# Patient Record
Sex: MALE | Race: WHITE | NOT HISPANIC OR LATINO | Employment: OTHER | URBAN - METROPOLITAN AREA
[De-identification: names, ages, dates, MRNs, and addresses within clinical notes are randomized per-mention and may not be internally consistent; named-entity substitution may affect disease eponyms.]

---

## 2020-08-25 ENCOUNTER — OFFICE VISIT (OUTPATIENT)
Dept: INTERNAL MEDICINE | Age: 55
End: 2020-08-25

## 2020-08-25 VITALS
OXYGEN SATURATION: 99 % | SYSTOLIC BLOOD PRESSURE: 130 MMHG | WEIGHT: 181 LBS | BODY MASS INDEX: 23.99 KG/M2 | TEMPERATURE: 96.7 F | HEART RATE: 51 BPM | DIASTOLIC BLOOD PRESSURE: 80 MMHG | HEIGHT: 73 IN

## 2020-08-25 DIAGNOSIS — Z00.00 ROUTINE ADULT HEALTH MAINTENANCE: ICD-10-CM

## 2020-08-25 DIAGNOSIS — Z12.5 ENCOUNTER FOR PROSTATE CANCER SCREENING: ICD-10-CM

## 2020-08-25 DIAGNOSIS — Z00.00 ANNUAL PHYSICAL EXAM: Primary | ICD-10-CM

## 2020-08-25 DIAGNOSIS — Z13.1 SCREENING FOR DIABETES MELLITUS: ICD-10-CM

## 2020-08-25 DIAGNOSIS — Z83.71 FH: COLON POLYPS: ICD-10-CM

## 2020-08-25 DIAGNOSIS — Z12.11 COLON CANCER SCREENING: ICD-10-CM

## 2020-08-25 DIAGNOSIS — Z11.59 NEED FOR HEPATITIS C SCREENING TEST: ICD-10-CM

## 2020-08-25 DIAGNOSIS — Z13.220 SCREENING FOR LIPID DISORDERS: ICD-10-CM

## 2020-08-25 PROBLEM — M16.12 PRIMARY OSTEOARTHRITIS OF LEFT HIP: Status: ACTIVE | Noted: 2017-02-24

## 2020-08-25 PROBLEM — Z96.642 STATUS POST LEFT HIP REPLACEMENT: Status: ACTIVE | Noted: 2017-03-08

## 2020-08-25 PROCEDURE — 99386 PREV VISIT NEW AGE 40-64: CPT | Performed by: NURSE PRACTITIONER

## 2020-08-25 RX ORDER — IBUPROFEN 400 MG/1
400 TABLET ORAL EVERY 6 HOURS PRN
COMMUNITY

## 2020-08-25 NOTE — PATIENT INSTRUCTIONS
Health Maintenance, Male  Adopting a healthy lifestyle and getting preventive care are important in promoting health and wellness. Ask your health care provider about:  · The right schedule for you to have regular tests and exams.  · Things you can do on your own to prevent diseases and keep yourself healthy.  What should I know about diet, weight, and exercise?  Eat a healthy diet    · Eat a diet that includes plenty of vegetables, fruits, low-fat dairy products, and lean protein.  · Do not eat a lot of foods that are high in solid fats, added sugars, or sodium.  Maintain a healthy weight  Body mass index (BMI) is a measurement that can be used to identify possible weight problems. It estimates body fat based on height and weight. Your health care provider can help determine your BMI and help you achieve or maintain a healthy weight.  Get regular exercise  Get regular exercise. This is one of the most important things you can do for your health. Most adults should:  · Exercise for at least 150 minutes each week. The exercise should increase your heart rate and make you sweat (moderate-intensity exercise).  · Do strengthening exercises at least twice a week. This is in addition to the moderate-intensity exercise.  · Spend less time sitting. Even light physical activity can be beneficial.  Watch cholesterol and blood lipids  Have your blood tested for lipids and cholesterol at 20 years of age, then have this test every 5 years.  You may need to have your cholesterol levels checked more often if:  · Your lipid or cholesterol levels are high.  · You are older than 40 years of age.  · You are at high risk for heart disease.  What should I know about cancer screening?  Many types of cancers can be detected early and may often be prevented. Depending on your health history and family history, you may need to have cancer screening at various ages. This may include screening for:  · Colorectal cancer.  · Prostate  cancer.  · Skin cancer.  · Lung cancer.  What should I know about heart disease, diabetes, and high blood pressure?  Blood pressure and heart disease  · High blood pressure causes heart disease and increases the risk of stroke. This is more likely to develop in people who have high blood pressure readings, are of  descent, or are overweight.  · Talk with your health care provider about your target blood pressure readings.  · Have your blood pressure checked:  ? Every 3-5 years if you are 18-39 years of age.  ? Every year if you are 40 years old or older.  · If you are between the ages of 65 and 75 and are a current or former smoker, ask your health care provider if you should have a one-time screening for abdominal aortic aneurysm (AAA).  Diabetes  Have regular diabetes screenings. This checks your fasting blood sugar level. Have the screening done:  · Once every three years after age 45 if you are at a normal weight and have a low risk for diabetes.  · More often and at a younger age if you are overweight or have a high risk for diabetes.  What should I know about preventing infection?  Hepatitis B  If you have a higher risk for hepatitis B, you should be screened for this virus. Talk with your health care provider to find out if you are at risk for hepatitis B infection.  Hepatitis C  Blood testing is recommended for:  · Everyone born from 1945 through 1965.  · Anyone with known risk factors for hepatitis C.  Sexually transmitted infections (STIs)  · You should be screened each year for STIs, including gonorrhea and chlamydia, if:  ? You are sexually active and are younger than 24 years of age.  ? You are older than 24 years of age and your health care provider tells you that you are at risk for this type of infection.  ? Your sexual activity has changed since you were last screened, and you are at increased risk for chlamydia or gonorrhea. Ask your health care provider if you are at risk.  · Ask your  health care provider about whether you are at high risk for HIV. Your health care provider may recommend a prescription medicine to help prevent HIV infection. If you choose to take medicine to prevent HIV, you should first get tested for HIV. You should then be tested every 3 months for as long as you are taking the medicine.  Follow these instructions at home:  Lifestyle  · Do not use any products that contain nicotine or tobacco, such as cigarettes, e-cigarettes, and chewing tobacco. If you need help quitting, ask your health care provider.  · Do not use street drugs.  · Do not share needles.  · Ask your health care provider for help if you need support or information about quitting drugs.  Alcohol use  · Do not drink alcohol if your health care provider tells you not to drink.  · If you drink alcohol:  ? Limit how much you have to 0-2 drinks a day.  ? Be aware of how much alcohol is in your drink. In the [place] one drink equals one 12 oz bottle of beer (355 mL), one 5 oz glass of wine (148 mL), or one 1½ oz glass of hard liquor (44 mL).  General instructions  · Schedule regular health, dental, and eye exams.  · Stay current with your vaccines.  · Tell your health care provider if:  ? You often feel depressed.  ? You have ever been abused or do not feel safe at home.  Summary  · Adopting a healthy lifestyle and getting preventive care are important in promoting health and wellness.  · Follow your health care provider's instructions about healthy diet, exercising, and getting tested or screened for diseases.  · Follow your health care provider's instructions on monitoring your cholesterol and blood pressure.  This information is not intended to replace advice given to you by your health care provider. Make sure you discuss any questions you have with your health care provider.  Document Released: 06/15/2009 Document Revised: 12/11/2019 Document Reviewed: 12/11/2019  Elsevier Patient Education © 2020 [employer]

## 2020-08-25 NOTE — PROGRESS NOTES
[facility]  MADAI RICARDO      Serafin Mary Kate / 54 y.o. / male  08/25/2020    CC: Establish Care and Annual Exam      HPI:      Serafin presents for annual health maintenance visit. He has not had any regular medical care in some time.     · Last health maintenance visit: never  · General health: good  · Lifestyle:  · Attempting to lose weight?: No   · Diet: eats very healthy, fruits, granola, avoidance of red meat   · Exercise: exercises nearly every day and wakeboarding, skiing, cycling every day   · Tobacco: Never used   · Alcohol: occasional/rare  · Work: Full-time, construction   ·   · Reproductive health:  · Sexually active?: Yes   · Concern for STD?: No   · Sexual problems?: No problems   · Sees Urologist?: No     · Depression Screening:      PHQ-2/PHQ-9 Depression Screening 8/25/2020   Little interest or pleasure in doing things 0   Feeling down, depressed, or hopeless 0   Total Score 0         PHQ-2: 0 (Not depressed)     PHQ-9: 0 (Negative screening for depression)    Patient Care Team:  Madai Ortiz APRN as PCP - General (Internal Medicine)  ______________________________________________________________________    ALLERGIES  Allergies   Allergen Reactions   • Oxycodone-Acetaminophen Nausea And Vomiting        MEDICATIONS  Current Outpatient Medications on File Prior to Visit   Medication Sig   • ibuprofen (ADVIL,MOTRIN) 400 MG tablet Take 400 mg by mouth.     No current facility-administered medications on file prior to visit.        PFSH:     The following portions of the patient's history were reviewed and updated as appropriate: Allergies / Current Medications / Past Medical History / Surgical History / Social History / Family History    PROBLEM LIST   Patient Active Problem List   Diagnosis   • Primary osteoarthritis of left hip   • Status post left hip replacement       PAST MEDICAL HISTORY  Past Medical History:   Diagnosis Date   • Prostatitis        SURGICAL HISTORY  Past  Surgical History:   Procedure Laterality Date   • ANKLE SURGERY     • HIP SURGERY     • KNEE SURGERY Right        SOCIAL HISTORY  Social History     Socioeconomic History   • Marital status: Single     Spouse name: Not on file   • Number of children: 1   • Years of education: Not on file   • Highest education level: Not on file   Occupational History   • Occupation: construction    Tobacco Use   • Smoking status: Never Smoker   • Smokeless tobacco: Never Used   Substance and Sexual Activity   • Alcohol use: Yes   • Drug use: Never   • Sexual activity: Yes       FAMILY HISTORY  Family History   Problem Relation Age of Onset   • Alzheimer's disease Father    • Colon polyps Sister    • Heart attack Paternal Uncle    • Prostate cancer Neg Hx    • Colon cancer Neg Hx        IMMUNIZATION HISTORY    There is no immunization history on file for this patient.    ______________________________________________________________________    REVIEW OF SYSTEMS    Review of Systems   Constitutional: Negative for activity change, appetite change, fatigue, fever and unexpected weight change.   HENT: Negative for congestion, ear pain, hearing loss, sore throat and trouble swallowing.    Eyes: Negative for pain and visual disturbance.   Respiratory: Negative for cough and shortness of breath.    Cardiovascular: Negative for chest pain and leg swelling.   Gastrointestinal: Negative for abdominal pain, blood in stool, constipation, diarrhea, nausea and vomiting.   Endocrine: Negative for polydipsia, polyphagia and polyuria.   Genitourinary: Negative for decreased urine volume, difficulty urinating, discharge, dysuria, enuresis, frequency, hematuria, penile pain, penile swelling, scrotal swelling, testicular pain and urgency.   Musculoskeletal: Negative for gait problem.   Neurological: Negative for dizziness, syncope, speech difficulty, weakness, numbness and headaches.   Psychiatric/Behavioral: Negative for confusion and sleep  "disturbance. The patient is not nervous/anxious.          VITALS:    Visit Vitals  /80   Pulse 51   Temp 96.7 °F (35.9 °C) (Temporal)   Ht 185.4 cm (73\")   Wt 82.1 kg (181 lb)   SpO2 99%   BMI 23.88 kg/m²       BP Readings from Last 3 Encounters:   08/25/20 130/80     Wt Readings from Last 3 Encounters:   08/25/20 82.1 kg (181 lb)      Body mass index is 23.88 kg/m².    PHYSICAL EXAMINATION    Physical Exam   Constitutional: Vital signs are normal. He appears well-developed and well-nourished. He is cooperative. He does not appear ill. No distress.   HENT:   Head: Normocephalic and atraumatic.   Right Ear: Hearing, tympanic membrane, external ear and ear canal normal.   Left Ear: Hearing, tympanic membrane, external ear and ear canal normal.   Oral exam deferred due to COVID-19 pandemic and mask policy due to increased risk of aerosolized infection      Eyes: Pupils are equal, round, and reactive to light. EOM and lids are normal.   Neck: Trachea normal and full passive range of motion without pain. Carotid bruit is not present. No thyroid mass and no thyromegaly present.   Cardiovascular: Normal rate, regular rhythm, S1 normal, S2 normal and normal heart sounds.   No murmur heard.  Pulmonary/Chest: Effort normal and breath sounds normal.   Abdominal: Soft. Normal appearance and bowel sounds are normal. There is no tenderness.   Genitourinary:   Genitourinary Comments:  exam deferred per patient.   Denies any symptoms of testicular pain, swelling, groin pain, or other genital abnormalities.        Lymphadenopathy:     He has no cervical adenopathy.     He has no axillary adenopathy.   Neurological: He is alert. He has normal strength. He is not disoriented. No cranial nerve deficit or sensory deficit.   Reflex Scores:       Bicep reflexes are 2+ on the right side and 2+ on the left side.       Brachioradialis reflexes are 2+ on the right side and 2+ on the left side.       Patellar reflexes are 2+ on the " right side and 2+ on the left side.       Achilles reflexes are 2+ on the right side and 2+ on the left side.  Skin: Skin is warm, dry and intact.   Multiple scattered nevi to back and extremities. None are concerning in relation to size, color, borders.    Nursing note and vitals reviewed.        REVIEWED DATA    Labs:    No results found for: NA, K, AST, ALT, BUN, CREATININE, EGFRIFNONA, EGFRIFAFRI    No results found for: GLUCOSE, HGBA1C, TSH, FREET4    No results found for: PSA, TESTOSTEROTT    No results found for: LDL, HDL, TRIG, CHOLHDLRATIO    No components found for: DVJJ821B    No results found for: WBC, HGB, MCV, PLT    No results found for: PROTEIN, GLUCOSEU, BLOODU, NITRITEU, LEUKOCYTESUR     No results found for: HEPCVIRUSABY    Imaging:           Medical Tests:       ______________________________________________________________________    ASSESSMENT & PLAN    ANNUAL WELLNESS EXAM / PHYSICAL     Other medical problems addressed today:      Summary/Discussion:       1. Annual physical exam      2. Routine adult health maintenance    - Ambulatory Referral For Screening Colonoscopy  - CBC & Differential  - TSH Rfx On Abnormal To Free T4  - Urinalysis With Culture If Indicated - Urine, Clean Catch    3. Colon cancer screening    - Ambulatory Referral For Screening Colonoscopy    4. FH: colon polyps    - Ambulatory Referral For Screening Colonoscopy    5. Screening for lipid disorders    - Lipid Panel With / Chol / HDL Ratio    6. Screening for diabetes mellitus    - Comprehensive Metabolic Panel  - Urinalysis With Culture If Indicated - Urine, Clean Catch  - Hemoglobin A1c    7. Encounter for prostate cancer screening    - PSA Screen    8. Need for hepatitis C screening test    - Hepatitis C Antibody          Return in about 1 year (around 8/25/2021) for Annual physical with fasting labs 1 week prior .    No future appointments.      HEALTHCARE MAINTENANCE ISSUES:    Cancer Screening:  · Colon:  Initial/Next screening at age: OVERDUE, ordered referral for colonoscopy   · Repeat colon cancer screening: N/A at this time  · Prostate: PSA checked annually but no ANTWON deferred per patient after benefit vs. Risk discussion of screening   · Testicular: Recommended monthly self exam  · Skin: Monthly self skin examination, annual exam by health professional  · Lung:   · Other:    Screening Labs & Tests:  · Lab results reviewed & discussed with with patient or orders placed today.  · EKG:  · Vascular Screening:   · DEXA (75+ or risk factors):   · HEP C (If born [id number] or risk factors): Ordered    Immunization/Vaccinations (to be given today unless deferred by patient)  · Influenza: Recommended annual influenza vaccine  · Hepatitis A: Not needed at this time  · Tetanus/Pertussis: Declined by patient  · Pneumovax: Not needed at this time  · Prevnar 13: Not needed at this time  · Shingles: Recommended Shingrix at pharmacy  · Other:     Lifestyle Counseling:  · Lifestyle Modifications: Continue good lifestyle choices/modifications and recommended annual skin checks due to multiple moles and sun exposure  · Safety Issues: Always wear seatbelt, Avoid texting while driving   · Use sunscreen, regular skin examination  · Recommended annual dental/vision examination.  · Emotional/Stress/Sleep: Reviewed and  given when appropriate      Health Maintenance   Topic Date Due   • ANNUAL PHYSICAL  10/17/1966   • TDAP/TD VACCINES (1 - Tdap) 10/17/1974   • ZOSTER VACCINE (1 of 2) 10/17/2013   • HEPATITIS C SCREENING  08/25/2020   • COLONOSCOPY  08/25/2020   • INFLUENZA VACCINE  08/01/2020         **Carlos Disclaimer:   Much of this encounter note is an electronic transcription/translation of spoken language to printed text. The electronic translation of spoken language may permit erroneous, or at times, nonsensical words or phrases to be inadvertently transcribed. Although I have reviewed the note for such errors, some may  still exist.
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
## 2020-08-26 DIAGNOSIS — R97.20 ELEVATED PSA: Primary | ICD-10-CM

## 2020-08-26 LAB
ALBUMIN SERPL-MCNC: 4.5 G/DL (ref 3.5–5.2)
ALBUMIN/GLOB SERPL: 2.5 G/DL
ALP SERPL-CCNC: 57 U/L (ref 39–117)
ALT SERPL-CCNC: 18 U/L (ref 1–41)
APPEARANCE UR: CLEAR
AST SERPL-CCNC: 19 U/L (ref 1–40)
BACTERIA #/AREA URNS HPF: NORMAL /HPF
BASOPHILS # BLD AUTO: 0.03 10*3/MM3 (ref 0–0.2)
BASOPHILS NFR BLD AUTO: 0.6 % (ref 0–1.5)
BILIRUB SERPL-MCNC: 0.5 MG/DL (ref 0–1.2)
BILIRUB UR QL STRIP: NEGATIVE
BUN SERPL-MCNC: 17 MG/DL (ref 6–20)
BUN/CREAT SERPL: 19.5 (ref 7–25)
CALCIUM SERPL-MCNC: 9.1 MG/DL (ref 8.6–10.5)
CHLORIDE SERPL-SCNC: 105 MMOL/L (ref 98–107)
CHOLEST SERPL-MCNC: 179 MG/DL (ref 0–200)
CHOLEST/HDLC SERPL: 2.84 {RATIO}
CO2 SERPL-SCNC: 25.9 MMOL/L (ref 22–29)
COLOR UR: YELLOW
CREAT SERPL-MCNC: 0.87 MG/DL (ref 0.76–1.27)
EOSINOPHIL # BLD AUTO: 0.16 10*3/MM3 (ref 0–0.4)
EOSINOPHIL NFR BLD AUTO: 3.5 % (ref 0.3–6.2)
EPI CELLS #/AREA URNS HPF: NORMAL /HPF (ref 0–10)
ERYTHROCYTE [DISTWIDTH] IN BLOOD BY AUTOMATED COUNT: 13.3 % (ref 12.3–15.4)
GLOBULIN SER CALC-MCNC: 1.8 GM/DL
GLUCOSE SERPL-MCNC: 111 MG/DL (ref 65–99)
GLUCOSE UR QL: NEGATIVE
HBA1C MFR BLD: 5.2 % (ref 4.8–5.6)
HCT VFR BLD AUTO: 43.3 % (ref 37.5–51)
HCV AB S/CO SERPL IA: 0.1 S/CO RATIO (ref 0–0.9)
HDLC SERPL-MCNC: 63 MG/DL (ref 40–60)
HGB BLD-MCNC: 14.5 G/DL (ref 13–17.7)
HGB UR QL STRIP: NEGATIVE
IMM GRANULOCYTES # BLD AUTO: 0.01 10*3/MM3 (ref 0–0.05)
IMM GRANULOCYTES NFR BLD AUTO: 0.2 % (ref 0–0.5)
KETONES UR QL STRIP: NEGATIVE
LDLC SERPL CALC-MCNC: 99 MG/DL (ref 0–100)
LEUKOCYTE ESTERASE UR QL STRIP: NEGATIVE
LYMPHOCYTES # BLD AUTO: 1.13 10*3/MM3 (ref 0.7–3.1)
LYMPHOCYTES NFR BLD AUTO: 24.5 % (ref 19.6–45.3)
MCH RBC QN AUTO: 30.6 PG (ref 26.6–33)
MCHC RBC AUTO-ENTMCNC: 33.5 G/DL (ref 31.5–35.7)
MCV RBC AUTO: 91.4 FL (ref 79–97)
MICRO URNS: NORMAL
MICRO URNS: NORMAL
MONOCYTES # BLD AUTO: 0.28 10*3/MM3 (ref 0.1–0.9)
MONOCYTES NFR BLD AUTO: 6.1 % (ref 5–12)
MUCOUS THREADS URNS QL MICRO: PRESENT /HPF
NEUTROPHILS # BLD AUTO: 3.01 10*3/MM3 (ref 1.7–7)
NEUTROPHILS NFR BLD AUTO: 65.1 % (ref 42.7–76)
NITRITE UR QL STRIP: NEGATIVE
NRBC BLD AUTO-RTO: 0 /100 WBC (ref 0–0.2)
PH UR STRIP: 6.5 [PH] (ref 5–7.5)
PLATELET # BLD AUTO: 208 10*3/MM3 (ref 140–450)
POTASSIUM SERPL-SCNC: 4.6 MMOL/L (ref 3.5–5.2)
PROT SERPL-MCNC: 6.3 G/DL (ref 6–8.5)
PROT UR QL STRIP: NORMAL
PSA SERPL-MCNC: 4.92 NG/ML (ref 0–4)
RBC # BLD AUTO: 4.74 10*6/MM3 (ref 4.14–5.8)
RBC #/AREA URNS HPF: NORMAL /HPF (ref 0–2)
SODIUM SERPL-SCNC: 141 MMOL/L (ref 136–145)
SP GR UR: 1.02 (ref 1–1.03)
TRIGL SERPL-MCNC: 84 MG/DL (ref 0–150)
TSH SERPL DL<=0.005 MIU/L-ACNC: 1.6 UIU/ML (ref 0.27–4.2)
URINALYSIS REFLEX: NORMAL
UROBILINOGEN UR STRIP-MCNC: 0.2 MG/DL (ref 0.2–1)
VLDLC SERPL CALC-MCNC: 16.8 MG/DL
WBC # BLD AUTO: 4.62 10*3/MM3 (ref 3.4–10.8)
WBC #/AREA URNS HPF: NORMAL /HPF (ref 0–5)

## 2020-09-09 ENCOUNTER — RESULTS ENCOUNTER (OUTPATIENT)
Dept: INTERNAL MEDICINE | Age: 55
End: 2020-09-09
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
## 2020-09-09 DIAGNOSIS — R97.20 ELEVATED PSA: ICD-10-CM

## 2020-09-09 LAB — PSA SERPL-MCNC: 4.63 NG/ML (ref 0–4)

## 2020-09-10 ENCOUNTER — TELEPHONE (OUTPATIENT)
Dept: INTERNAL MEDICINE | Age: 55
End: 2020-09-10

## 2020-09-10 DIAGNOSIS — R97.20 ELEVATED PSA: Primary | ICD-10-CM

## 2020-09-22 ENCOUNTER — PREP FOR SURGERY (OUTPATIENT)
Dept: OTHER | Facility: HOSPITAL | Age: 55
End: 2020-09-22

## 2020-09-22 DIAGNOSIS — Z12.11 SCREEN FOR COLON CANCER: Primary | ICD-10-CM

## 2020-09-23 PROBLEM — Z12.11 SCREEN FOR COLON CANCER: Status: ACTIVE | Noted: 2020-09-23

## 2020-10-07 ENCOUNTER — TRANSCRIBE ORDERS (OUTPATIENT)
Dept: ADMINISTRATIVE | Facility: HOSPITAL | Age: 55
End: 2020-10-07

## 2020-10-07 DIAGNOSIS — Z01.818 OTHER SPECIFIED PRE-OPERATIVE EXAMINATION: Primary | ICD-10-CM

## 2020-10-10 ENCOUNTER — LAB (OUTPATIENT)
Dept: LAB | Facility: HOSPITAL | Age: 55
End: 2020-10-10

## 2020-10-10 DIAGNOSIS — Z01.818 OTHER SPECIFIED PRE-OPERATIVE EXAMINATION: ICD-10-CM

## 2020-10-10 PROCEDURE — C9803 HOPD COVID-19 SPEC COLLECT: HCPCS

## 2020-10-10 PROCEDURE — U0004 COV-19 TEST NON-CDC HGH THRU: HCPCS

## 2020-10-12 LAB — SARS-COV-2 RNA RESP QL NAA+PROBE: NOT DETECTED

## 2020-10-13 ENCOUNTER — ANESTHESIA (OUTPATIENT)
Dept: GASTROENTEROLOGY | Facility: HOSPITAL | Age: 55
End: 2020-10-13

## 2020-10-13 ENCOUNTER — HOSPITAL ENCOUNTER (OUTPATIENT)
Facility: HOSPITAL | Age: 55
Setting detail: HOSPITAL OUTPATIENT SURGERY
Discharge: HOME | End: 2020-10-13
Attending: SURGERY | Admitting: SURGERY

## 2020-10-13 ENCOUNTER — ANESTHESIA EVENT (OUTPATIENT)
Dept: GASTROENTEROLOGY | Facility: HOSPITAL | Age: 55
End: 2020-10-13

## 2020-10-13 VITALS
SYSTOLIC BLOOD PRESSURE: 138 MMHG | OXYGEN SATURATION: 100 % | HEIGHT: 73 IN | RESPIRATION RATE: 14 BRPM | BODY MASS INDEX: 23.59 KG/M2 | TEMPERATURE: 97.6 F | WEIGHT: 178 LBS | DIASTOLIC BLOOD PRESSURE: 85 MMHG | HEART RATE: 48 BPM

## 2020-10-13 DIAGNOSIS — Z12.11 SCREEN FOR COLON CANCER: ICD-10-CM

## 2020-10-13 PROBLEM — K63.5 COLON POLYPS: Status: ACTIVE | Noted: 2020-10-13

## 2020-10-13 PROCEDURE — 88305 TISSUE EXAM BY PATHOLOGIST: CPT | Performed by: SURGERY

## 2020-10-13 PROCEDURE — 45380 COLONOSCOPY AND BIOPSY: CPT | Performed by: SURGERY

## 2020-10-13 PROCEDURE — 25010000002 PROPOFOL 10 MG/ML EMULSION: Performed by: ANESTHESIOLOGY

## 2020-10-13 PROCEDURE — 45385 COLONOSCOPY W/LESION REMOVAL: CPT | Performed by: SURGERY

## 2020-10-13 PROCEDURE — S0260 H&P FOR SURGERY: HCPCS | Performed by: SURGERY

## 2020-10-13 DEVICE — DEV CLIP ENDO RESOLUTION360 CONTRL ROT 235CM: Type: IMPLANTABLE DEVICE | Site: DESCENDING COLON | Status: FUNCTIONAL

## 2020-10-13 RX ORDER — NALOXONE HCL 0.4 MG/ML
0.2 VIAL (ML) INJECTION AS NEEDED
Status: DISCONTINUED | OUTPATIENT
Start: 2020-10-13 | End: 2020-10-13 | Stop reason: HOSPADM

## 2020-10-13 RX ORDER — PROPOFOL 10 MG/ML
VIAL (ML) INTRAVENOUS CONTINUOUS PRN
Status: DISCONTINUED | OUTPATIENT
Start: 2020-10-13 | End: 2020-10-13 | Stop reason: SURG

## 2020-10-13 RX ORDER — PROPOFOL 10 MG/ML
VIAL (ML) INTRAVENOUS AS NEEDED
Status: DISCONTINUED | OUTPATIENT
Start: 2020-10-13 | End: 2020-10-13 | Stop reason: SURG

## 2020-10-13 RX ORDER — OXYCODONE AND ACETAMINOPHEN 7.5; 325 MG/1; MG/1
1 TABLET ORAL ONCE AS NEEDED
Status: DISCONTINUED | OUTPATIENT
Start: 2020-10-13 | End: 2020-10-13 | Stop reason: HOSPADM

## 2020-10-13 RX ORDER — SODIUM CHLORIDE, SODIUM LACTATE, POTASSIUM CHLORIDE, CALCIUM CHLORIDE 600; 310; 30; 20 MG/100ML; MG/100ML; MG/100ML; MG/100ML
30 INJECTION, SOLUTION INTRAVENOUS CONTINUOUS PRN
Status: DISCONTINUED | OUTPATIENT
Start: 2020-10-13 | End: 2020-10-13 | Stop reason: HOSPADM

## 2020-10-13 RX ORDER — ONDANSETRON 2 MG/ML
4 INJECTION INTRAMUSCULAR; INTRAVENOUS ONCE AS NEEDED
Status: DISCONTINUED | OUTPATIENT
Start: 2020-10-13 | End: 2020-10-13 | Stop reason: HOSPADM

## 2020-10-13 RX ORDER — FLUMAZENIL 0.1 MG/ML
0.2 INJECTION INTRAVENOUS AS NEEDED
Status: DISCONTINUED | OUTPATIENT
Start: 2020-10-13 | End: 2020-10-13 | Stop reason: HOSPADM

## 2020-10-13 RX ORDER — HYDROCODONE BITARTRATE AND ACETAMINOPHEN 7.5; 325 MG/1; MG/1
1 TABLET ORAL ONCE AS NEEDED
Status: DISCONTINUED | OUTPATIENT
Start: 2020-10-13 | End: 2020-10-13 | Stop reason: HOSPADM

## 2020-10-13 RX ORDER — FENTANYL CITRATE 50 UG/ML
50 INJECTION, SOLUTION INTRAMUSCULAR; INTRAVENOUS
Status: DISCONTINUED | OUTPATIENT
Start: 2020-10-13 | End: 2020-10-13 | Stop reason: HOSPADM

## 2020-10-13 RX ORDER — SODIUM CHLORIDE 0.9 % (FLUSH) 0.9 %
3 SYRINGE (ML) INJECTION EVERY 12 HOURS SCHEDULED
Status: DISCONTINUED | OUTPATIENT
Start: 2020-10-13 | End: 2020-10-13 | Stop reason: HOSPADM

## 2020-10-13 RX ORDER — PROMETHAZINE HYDROCHLORIDE 25 MG/1
25 SUPPOSITORY RECTAL ONCE AS NEEDED
Status: DISCONTINUED | OUTPATIENT
Start: 2020-10-13 | End: 2020-10-13 | Stop reason: HOSPADM

## 2020-10-13 RX ORDER — DIPHENHYDRAMINE HCL 25 MG
25 CAPSULE ORAL
Status: DISCONTINUED | OUTPATIENT
Start: 2020-10-13 | End: 2020-10-13 | Stop reason: HOSPADM

## 2020-10-13 RX ORDER — SODIUM CHLORIDE 0.9 % (FLUSH) 0.9 %
10 SYRINGE (ML) INJECTION AS NEEDED
Status: DISCONTINUED | OUTPATIENT
Start: 2020-10-13 | End: 2020-10-13 | Stop reason: HOSPADM

## 2020-10-13 RX ORDER — HYDROMORPHONE HCL 110MG/55ML
0.5 PATIENT CONTROLLED ANALGESIA SYRINGE INTRAVENOUS
Status: DISCONTINUED | OUTPATIENT
Start: 2020-10-13 | End: 2020-10-13 | Stop reason: HOSPADM

## 2020-10-13 RX ORDER — DIPHENHYDRAMINE HYDROCHLORIDE 50 MG/ML
12.5 INJECTION INTRAMUSCULAR; INTRAVENOUS
Status: DISCONTINUED | OUTPATIENT
Start: 2020-10-13 | End: 2020-10-13 | Stop reason: HOSPADM

## 2020-10-13 RX ORDER — EPHEDRINE SULFATE 50 MG/ML
5 INJECTION, SOLUTION INTRAVENOUS ONCE AS NEEDED
Status: DISCONTINUED | OUTPATIENT
Start: 2020-10-13 | End: 2020-10-13 | Stop reason: HOSPADM

## 2020-10-13 RX ORDER — PROMETHAZINE HYDROCHLORIDE 25 MG/1
25 TABLET ORAL ONCE AS NEEDED
Status: DISCONTINUED | OUTPATIENT
Start: 2020-10-13 | End: 2020-10-13 | Stop reason: HOSPADM

## 2020-10-13 RX ORDER — LABETALOL HYDROCHLORIDE 5 MG/ML
5 INJECTION, SOLUTION INTRAVENOUS
Status: DISCONTINUED | OUTPATIENT
Start: 2020-10-13 | End: 2020-10-13 | Stop reason: HOSPADM

## 2020-10-13 RX ORDER — LIDOCAINE HYDROCHLORIDE 20 MG/ML
INJECTION, SOLUTION INFILTRATION; PERINEURAL AS NEEDED
Status: DISCONTINUED | OUTPATIENT
Start: 2020-10-13 | End: 2020-10-13 | Stop reason: SURG

## 2020-10-13 RX ADMIN — SODIUM CHLORIDE, POTASSIUM CHLORIDE, SODIUM LACTATE AND CALCIUM CHLORIDE 30 ML/HR: 600; 310; 30; 20 INJECTION, SOLUTION INTRAVENOUS at 09:30

## 2020-10-13 RX ADMIN — PROPOFOL 140 MCG/KG/MIN: 10 INJECTION, EMULSION INTRAVENOUS at 09:46

## 2020-10-13 RX ADMIN — LIDOCAINE HYDROCHLORIDE 100 MG: 20 INJECTION, SOLUTION INFILTRATION; PERINEURAL at 09:46

## 2020-10-13 RX ADMIN — PROPOFOL 100 MG: 10 INJECTION, EMULSION INTRAVENOUS at 09:46

## 2020-10-13 NOTE — ANESTHESIA PREPROCEDURE EVALUATION
" Anesthesia Evaluation     Patient summary reviewed and Nursing notes reviewed   history of anesthetic complications:  NPO Solid Status: > 8 hours  NPO Liquid Status: > 2 hours           Airway   Mallampati: II  TM distance: >3 FB  Neck ROM: full  Dental - normal exam     Pulmonary - negative pulmonary ROS and normal exam   Cardiovascular - negative cardio ROS and normal exam  Exercise tolerance: good (4-7 METS)    Rhythm: regular        Neuro/Psych- negative ROS  GI/Hepatic/Renal/Endo - negative ROS     Musculoskeletal     Abdominal  - normal exam    Bowel sounds: normal.   Substance History - negative use     OB/GYN negative ob/gyn ROS         Other   arthritis,                      Anesthesia Plan    ASA 2     MAC   (Ht 185.4 cm (73\")   Wt 79.4 kg (175 lb)   BMI 23.09 kg/m²     I have reviewed the patient's history with the patient and the chart, including all pertinent laboratory results and imaging. I have explained the risks of anesthesia including but not limited to dental damage, corneal abrasion, nerve injury, MI, stroke, and death.  )  intravenous induction     Anesthetic plan, all risks, benefits, and alternatives have been provided, discussed and informed consent has been obtained with: patient.    Plan discussed with CRNA and attending.      "

## 2020-10-13 NOTE — ANESTHESIA POSTPROCEDURE EVALUATION
"Patient: Serafin Hartmann    Procedure Summary     Date: 10/13/20 Room / [facility]:  [facility]    Anesthesia Start: 0945 Anesthesia Stop: 1026    Procedure: COLONOSCOPY INTO CECUM WITH POLYPECTOMY X 3, CLIP X 1 (N/A ) Diagnosis:       Screen for colon cancer      (Screen for colon cancer [Z12.11])    Surgeon: Carlito Mcdaniels MD Provider: Ghazal Fajardo MD    Anesthesia Type: MAC ASA Status: 2          Anesthesia Type: MAC    Vitals  Vitals Value Taken Time   /72 10/13/20 1024   Temp     Pulse 49 10/13/20 1024   Resp 14 10/13/20 1024   SpO2 100 % 10/13/20 1024           Post Anesthesia Care and Evaluation    Patient location during evaluation: PHASE II  Patient participation: complete - patient participated  Level of consciousness: sleepy but conscious  Pain management: adequate  Airway patency: patent  Anesthetic complications: No anesthetic complications    Cardiovascular status: acceptable  Respiratory status: acceptable  Hydration status: acceptable    Comments: /72 (BP Location: Left arm, Patient Position: Lying)   Pulse (!) 49   Temp 36.4 °C (97.6 °F) (Oral)   Resp 14   Ht 185.4 cm (73\")   Wt 80.7 kg (178 lb)   SpO2 100%   BMI 23.48 kg/m²         "

## 2020-10-13 NOTE — H&P
Subjective:   Reason for Visit: Screening colonoscopy    HPI:  Patient presents for evaluation for colon cancer screening.  There is no family history of colon neoplasia. No family hx of gastric, ovarian, or uterine cancer.  Patient denies changes in bowel habits, diarrhea, constipation, abdominal pain, decreased caliber of stool, melena, brbpr and weight loss.  There is no personal or family history of bleeding disorder or untoward reaction to anesthesia.  Patient has never had a colonoscopy.      Past Medical History:   Diagnosis Date   • GERD (gastroesophageal reflux disease)    • Prostatitis        Past Surgical History:   Procedure Laterality Date   • HAND SURGERY Right    • HIP SURGERY     • KNEE SURGERY Right          Current Facility-Administered Medications:   •  lactated ringers infusion, 30 mL/hr, Intravenous, Continuous PRN, Carlito Mcdaniels MD, Last Rate: 30 mL/hr at 10/13/20 0930  •  sodium chloride 0.9 % flush 10 mL, 10 mL, Intravenous, PRN, Carlito Mcdaniels MD  •  sodium chloride 0.9 % flush 3 mL, 3 mL, Intravenous, Q12H, Carlito Mcdaneils MD    Facility-Administered Medications Ordered in Other Encounters:   •  lidocaine (XYLOCAINE) 2% injection, , Intravenous, PRN, Ghazal Fajardo MD, 100 mg at 10/13/20 0946  •  Propofol (DIPRIVAN) injection, , Intravenous, PRN, Ghazal Fajardo MD, 100 mg at 10/13/20 0946  •  Propofol (DIPRIVAN) injection, , , Continuous PRN, Ghazal Fajardo MD, Last Rate: 67.8 mL/hr at 10/13/20 0946, 140 mcg/kg/min at 10/13/20 0946    Allergies   Allergen Reactions   • Oxycodone-Acetaminophen Nausea And Vomiting       Family History   Problem Relation Age of Onset   • Alzheimer's disease Father    • Colon polyps Sister    • Heart attack Paternal Uncle    • Prostate cancer Neg Hx    • Colon cancer Neg Hx    • Malig Hyperthermia Neg Hx        Social History     Socioeconomic History   • Marital status: Single     Spouse  "name: Not on file   • Number of children: 1   • Years of education: Not on file   • Highest education level: Not on file   Occupational History   • Occupation: construction    Tobacco Use   • Smoking status: Never Smoker   • Smokeless tobacco: Never Used   Substance and Sexual Activity   • Alcohol use: Yes     Comment: OCCASIONALLY    • Drug use: Never   • Sexual activity: Yes        Review Of Systems:  A comprehensive review of systems was negative.    Objective:   Physical exam:  /88 (BP Location: Left arm, Patient Position: Lying)   Pulse 57   Temp 97.6 °F (36.4 °C) (Oral)   Resp 16   Ht 185.4 cm (73\")   Wt 80.7 kg (178 lb)   SpO2 99%   BMI 23.48 kg/m²     General Appearance:  awake, alert, oriented, in no acute distress and well developed, well nourished  Lungs:  Normal expansion.  Clear to auscultation.  No rales, rhonchi, or wheezing.  Heart:  Heart sounds are normal.  Regular rate and rhythm without murmur, gallop or rub.  Abdomen:  Soft, non-tender, normal bowel sounds; no bruits, organomegaly or masses.    Assessment:    Serafin Hartmann is a 54 y.o. male who presents for evaluation for colon cancer screening.    Patient has no increased risk factors or warning signs or symptoms.  I reviewed current ACG guidelines for colon cancer screening:    A)  Flex sig q 5yrs with yearly fecal occult blood testing  B)  Virtual colonoscopy  C)  Colonoscopy with possible biopsy/polypectomy with IV sedation at appropriate       screening intervals    The patient has family history of colon polyp.  He  has no personal history of colon polyp who presents for colon cancer screening evaluation.   I would advise a colonscopy.     The rationale for each option as well as all risks and benefits of each alternative were discussed with the patient in detail.  The patient understands and does wish to proceed with Colonoscopy Screening        The rationale for colonoscopy with possible biopsy, possible polypectomy, with IV " sedation as well as all risks, benefits, and alternatives were discussed with the patient in detail. Risks including but not limited to perforation, bleeding,need for blood transfusion or emergent surgery ,and missed neoplasm were reviewed in detail with the patient The patient demonstrates full understanding and wishes to proceed with the colonoscopy.

## 2020-10-15 LAB
LAB AP CASE REPORT: NORMAL
PATH REPORT.FINAL DX SPEC: NORMAL
PATH REPORT.GROSS SPEC: NORMAL

## 2020-10-19 ENCOUNTER — TELEPHONE (OUTPATIENT)
Dept: INTERNAL MEDICINE | Age: 55
End: 2020-10-19

## 2020-10-19 NOTE — TELEPHONE ENCOUNTER
Contact patient.    If it is a cyst of the skin (I.e. sebaceous cyst), then those are usually addressed by general surgery.   Does he need a referral back to general surgery or dermatology or both?

## 2020-10-19 NOTE — TELEPHONE ENCOUNTER
PATIENT CALLING BECAUSE HE NEEDS A REFERRAL TO SEE AND DERMATOLOGIST.     PATIENT CAN BE REACHED [phone]

## 2020-10-20 ENCOUNTER — OFFICE VISIT (OUTPATIENT)
Dept: INTERNAL MEDICINE | Age: 55
End: 2020-10-20

## 2020-10-20 ENCOUNTER — HOSPITAL ENCOUNTER (OUTPATIENT)
Dept: GENERAL RADIOLOGY | Facility: HOSPITAL | Age: 55
Discharge: HOME | End: 2020-10-20

## 2020-10-20 ENCOUNTER — TELEPHONE (OUTPATIENT)
Dept: CARDIOLOGY | Facility: CLINIC | Age: 55
End: 2020-10-20

## 2020-10-20 VITALS
WEIGHT: 190 LBS | TEMPERATURE: 96.8 F | HEIGHT: 73 IN | DIASTOLIC BLOOD PRESSURE: 78 MMHG | OXYGEN SATURATION: 98 % | BODY MASS INDEX: 25.18 KG/M2 | HEART RATE: 58 BPM | SYSTOLIC BLOOD PRESSURE: 136 MMHG

## 2020-10-20 DIAGNOSIS — S69.91XA THUMB INJURY, RIGHT, INITIAL ENCOUNTER: ICD-10-CM

## 2020-10-20 DIAGNOSIS — L72.0 EPIDERMAL CYST: ICD-10-CM

## 2020-10-20 DIAGNOSIS — I45.10 INCOMPLETE RBBB: ICD-10-CM

## 2020-10-20 DIAGNOSIS — R07.9 CHEST PAIN, UNSPECIFIED TYPE: Primary | ICD-10-CM

## 2020-10-20 DIAGNOSIS — Z86.16 HISTORY OF 2019 NOVEL CORONAVIRUS DISEASE (COVID-19): ICD-10-CM

## 2020-10-20 PROCEDURE — 99214 OFFICE O/P EST MOD 30 MIN: CPT | Performed by: NURSE PRACTITIONER

## 2020-10-20 PROCEDURE — 93000 ELECTROCARDIOGRAM COMPLETE: CPT | Performed by: NURSE PRACTITIONER

## 2020-10-20 PROCEDURE — 71046 X-RAY EXAM CHEST 2 VIEWS: CPT

## 2020-10-20 PROCEDURE — 73130 X-RAY EXAM OF HAND: CPT
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
## 2020-10-20 NOTE — TELEPHONE ENCOUNTER
Dr Kingston    Mr Hartmann is on to see you tomorrow, he test positive for COVID 6 weeks ago, is no longer having any symptoms but is having increased chest pain.  Are you okay with seeing him in the office?    Thank you  Morena RAHMAN

## 2020-10-21 ENCOUNTER — OFFICE VISIT (OUTPATIENT)
Dept: CARDIOLOGY | Facility: CLINIC | Age: 55
End: 2020-10-21

## 2020-10-21 VITALS
HEIGHT: 72 IN | HEART RATE: 66 BPM | DIASTOLIC BLOOD PRESSURE: 80 MMHG | SYSTOLIC BLOOD PRESSURE: 132 MMHG | BODY MASS INDEX: 25.6 KG/M2 | WEIGHT: 189 LBS | OXYGEN SATURATION: 98 % | TEMPERATURE: 97.3 F

## 2020-10-21 DIAGNOSIS — U07.1 COVID-19 VIRUS DETECTED: ICD-10-CM

## 2020-10-21 DIAGNOSIS — R07.2 PRECORDIAL PAIN: Primary | ICD-10-CM

## 2020-10-21 LAB
BASOPHILS # BLD AUTO: 0.06 10*3/MM3 (ref 0–0.2)
BASOPHILS NFR BLD AUTO: 1 % (ref 0–1.5)
CRP SERPL-MCNC: 0.09 MG/DL (ref 0–0.5)
EOSINOPHIL # BLD AUTO: 0.54 10*3/MM3 (ref 0–0.4)
EOSINOPHIL NFR BLD AUTO: 9.4 % (ref 0.3–6.2)
ERYTHROCYTE [DISTWIDTH] IN BLOOD BY AUTOMATED COUNT: 13.1 % (ref 12.3–15.4)
ERYTHROCYTE [SEDIMENTATION RATE] IN BLOOD BY WESTERGREN METHOD: 6 MM/HR (ref 0–20)
HCT VFR BLD AUTO: 43.9 % (ref 37.5–51)
HGB BLD-MCNC: 14.6 G/DL (ref 13–17.7)
IMM GRANULOCYTES # BLD AUTO: 0.01 10*3/MM3 (ref 0–0.05)
IMM GRANULOCYTES NFR BLD AUTO: 0.2 % (ref 0–0.5)
LYMPHOCYTES # BLD AUTO: 1.42 10*3/MM3 (ref 0.7–3.1)
LYMPHOCYTES NFR BLD AUTO: 24.6 % (ref 19.6–45.3)
MCH RBC QN AUTO: 29.9 PG (ref 26.6–33)
MCHC RBC AUTO-ENTMCNC: 33.3 G/DL (ref 31.5–35.7)
MCV RBC AUTO: 90 FL (ref 79–97)
MONOCYTES # BLD AUTO: 0.36 10*3/MM3 (ref 0.1–0.9)
MONOCYTES NFR BLD AUTO: 6.2 % (ref 5–12)
NEUTROPHILS # BLD AUTO: 3.38 10*3/MM3 (ref 1.7–7)
NEUTROPHILS NFR BLD AUTO: 58.6 % (ref 42.7–76)
NRBC BLD AUTO-RTO: 0 /100 WBC (ref 0–0.2)
PLATELET # BLD AUTO: 193 10*3/MM3 (ref 140–450)
RBC # BLD AUTO: 4.88 10*6/MM3 (ref 4.14–5.8)
TROPONIN I SERPL-MCNC: <0.01 NG/ML (ref 0–0.04)
WBC # BLD AUTO: 5.77 10*3/MM3 (ref 3.4–10.8)

## 2020-10-21 PROCEDURE — 93000 ELECTROCARDIOGRAM COMPLETE: CPT | Performed by: INTERNAL MEDICINE

## 2020-10-21 PROCEDURE — 99204 OFFICE O/P NEW MOD 45 MIN: CPT | Performed by: INTERNAL MEDICINE

## 2020-10-21 NOTE — PROGRESS NOTES
PATIENTINFORMATION    Date of Office Visit: 10/21/20  Encounter Provider: Kate Kingston MD  Place of Service: [facility] CARDIOLOGY  Patient Name: Serafin Hartmann  : 1965    Subjective:         Patient ID: Serafin Hartmann is a 55 y.o. male.      History of Present Illness    This is a gentleman who was diagnosed with Covid on 2020.  He is an avid bike rider and did some reading and decided that he should probably take it easy, even though at the time of his diagnosis he was still riding his bike aggressively.  He took a few weeks off and then started noticing some discomfort in the center of his chest which felt like a pressure.  It seems to be worse at night when he is lying down.  He has been walking and has not had any exertional symptoms.  He saw his primary provider yesterday who performed an EKG.  The report says there is an incomplete left bundle branch block.  That is not present on today's EKG nor was it present on a prior EKG.        Review of Systems   Constitution: Negative for fever, malaise/fatigue, weight gain and weight loss.   HENT: Negative for ear pain, hearing loss, nosebleeds and sore throat.    Eyes: Negative for double vision, pain, vision loss in left eye and vision loss in right eye.   Cardiovascular:        See history of present illness.   Respiratory: Positive for shortness of breath. Negative for cough, sleep disturbances due to breathing, snoring and wheezing.    Endocrine: Negative for cold intolerance, heat intolerance and polyuria.   Skin: Negative for itching, poor wound healing and rash.   Musculoskeletal: Negative for joint pain, joint swelling and myalgias.   Gastrointestinal: Negative for abdominal pain, diarrhea, hematochezia, nausea and vomiting.   Genitourinary: Negative for hematuria and hesitancy.   Neurological: Negative for numbness, paresthesias and seizures.   Psychiatric/Behavioral: Negative for depression. The patient is not  "nervous/anxious.            ECG 12 Lead    Date/Time: 10/21/2020 10:25 AM  Performed by: Kate Kingston MD  Authorized by: Kate Kingston MD   Previous ECG: no previous ECG available  Rhythm: sinus rhythm  BPM: 56  Conduction: conduction normal  ST Segments: ST segments normal  T Waves: T waves normal    Clinical impression: normal ECG               Objective:     /80 (BP Location: Left arm)   Pulse 66   Temp 97.3 °F (36.3 °C) (Infrared)   Ht 182.9 cm (72\")   Wt 85.7 kg (189 lb)   SpO2 98%   BMI 25.63 kg/m²  Body mass index is 25.63 kg/m².     Vitals signs reviewed.   Constitutional:       Appearance: Normal appearance. Well-developed.   Eyes:      General: Lids are normal. Lids are everted, no foreign bodies appreciated.      Conjunctiva/sclera: Conjunctivae normal.      Pupils: Pupils are equal, round, and reactive to light.   HENT:      Head: Normocephalic and atraumatic.   Neck:      Musculoskeletal: Normal range of motion.      Thyroid: No thyroid mass.      Vascular: No carotid bruit or JVD.      Trachea: No tracheal deviation.   Pulmonary:      Effort: Pulmonary effort is normal.      Breath sounds: Normal breath sounds.   Cardiovascular:      Normal rate. Regular rhythm.   Pulses:     Dorsalis pedis: 2+ bilaterally.  Abdominal:      General: Bowel sounds are normal.   Musculoskeletal: Normal range of motion.   Skin:     General: Skin is warm and dry.   Neurological:      Mental Status: Alert.   Psychiatric:         Behavior: Behavior normal.         Lab Review: I reviewed labs from October 20, 2020.  Troponin negative.  C-reactive protein is low.  Sedimentation rate is normal.      Assessment/Plan:       1.  Chest discomfort.  Atypical.  I do think it is reasonable to check a treadmill stress test as well as an echocardiogram given his recent diagnosis of Covid.  2.  Abnormal EKG.  Working to get that copy of the EKG faxed over so I can review it.  Today's EKG is normal.    If his echo and " treadmill look okay, I would feel comfortable telling him he can be released to go back to his routine exercises.    Addendum: I was able to get a copy of the EKG.  It was read as a incomplete right bundle branch block.  However, in reviewing it myself, is a normal EKG and no significant difference from today's EKG.    Orders Placed This Encounter   Procedures   • Treadmill Stress Test     Standing Status:   Future     Standing Expiration Date:   10/21/2021     Order Specific Question:   Reason for exam?     Answer:   Chest Pain   • ECG 12 Lead     This order was created via procedure documentation   • Adult Transthoracic Echo Complete W/ Cont if Necessary Per Protocol     Standing Status:   Future     Standing Expiration Date:   10/21/2021     Order Specific Question:   Reason for exam?     Answer:   Chest Pain        Discharge Medications          Accurate as of October 21, 2020 10:59 AM. If you have any questions, ask your nurse or doctor.            Continue These Medications      Instructions Start Date   ibuprofen 400 MG tablet  Commonly known as: ADVIL,MOTRIN   400 mg, Oral      MULTIVITAMIN ADULT PO   1 tablet, Oral, Daily                    Kate Kingston MD  10/21/20  10:59 EDT

## 2020-10-26 ENCOUNTER — TELEPHONE (OUTPATIENT)
Dept: SURGERY | Facility: CLINIC | Age: 55
End: 2020-10-26

## 2020-10-26 NOTE — TELEPHONE ENCOUNTER
I called and spoke with Serafin Mary Kate regarding his colonoscopy results.     Pathology report:   Final Diagnosis   1. Descending Colon, Biopsy:               A. Tubular adenoma.               B. Negative for high-grade dysplasia.     2. Distal Descending Colon, Biopsy:               A. Tubular adenoma.               B. Negative for high-grade dysplasia.     3. Sigmoid Colon, Biopsy:               A. Tubular adenoma.               B. Negative for high-grade dysplasia.         I recommend that he undergoes colonoscopy in 5 years for surveillance    He verbalized understanding and agreed    Carlito Mcdaniels MD  General, Minimally Invasive and Endoscopic Surgery  [facility]    [address], [address]  [place], [place], [address]  P: [phone]  F: [phone]

## 2020-10-28 ENCOUNTER — PROCEDURE VISIT (OUTPATIENT)
Dept: SURGERY | Facility: CLINIC | Age: 55
End: 2020-10-28

## 2020-10-28 VITALS — HEIGHT: 72 IN | WEIGHT: 189 LBS | BODY MASS INDEX: 25.6 KG/M2

## 2020-10-28 DIAGNOSIS — L98.9 SKIN LESION OF RIGHT LEG: Primary | ICD-10-CM

## 2020-10-28 DIAGNOSIS — L72.3 SEBACEOUS CYST: ICD-10-CM

## 2020-10-28 PROCEDURE — 11401 EXC TR-EXT B9+MARG 0.6-1 CM: CPT | Performed by: SURGERY

## 2020-10-28 PROCEDURE — 12032 INTMD RPR S/A/T/EXT 2.6-7.5: CPT | Performed by: SURGERY

## 2020-10-28 PROCEDURE — 11104 PUNCH BX SKIN SINGLE LESION: CPT | Performed by: SURGERY

## 2020-10-28 PROCEDURE — 11402 EXC TR-EXT B9+MARG 1.1-2 CM: CPT | Performed by: SURGERY

## 2020-10-28 PROCEDURE — 88305 TISSUE EXAM BY PATHOLOGIST: CPT | Performed by: SURGERY

## 2020-10-28 NOTE — PROGRESS NOTES
Office Consultation    Madai Ortiz APRN    Chief Complaint   Patient presents with   • Abdomal Wall Nodule   • RLE Nodule       HPI:       Serafin Hartmann is a 55 y.o. male who presents for evaluation of a subcutaneous nodule located over the chest, abdomen and right thigh. This has been present for several years.  There has been no associated symptoms of discharge, tenderness, sudden swelling, or pain. He has small amount of drainage from chest cyst.  Patient does have a history of epidermal inclusion cysts.  Had a subcutaneous mass removed when in college at the abdominal wall.  He does not recall the final pathology remembers it was benign    Past Medical History:   Diagnosis Date   • GERD (gastroesophageal reflux disease)    • Prostatitis        Past Surgical History:   Procedure Laterality Date   • COLONOSCOPY N/A 10/13/2020    Procedure: COLONOSCOPY INTO CECUM WITH POLYPECTOMY X 3, CLIP X 1;  Surgeon: Carlito Mcdaniels MD;  Location: [facility] ENDOSCOPY;  Service: General;  Laterality: N/A;  pre: screening  post: colon polyps x 3   • HAND SURGERY Right    • HIP SURGERY     • KNEE SURGERY Right        Current Outpatient Medications on File Prior to Visit   Medication Sig Dispense Refill   • ibuprofen (ADVIL,MOTRIN) 400 MG tablet Take 400 mg by mouth Every 6 (Six) Hours As Needed.     • Multiple Vitamins-Minerals (MULTIVITAMIN ADULT PO) Take 1 tablet by mouth Daily.       No current facility-administered medications on file prior to visit.        Allergies   Allergen Reactions   • Oxycodone-Acetaminophen Nausea And Vomiting and GI Intolerance       Social History     Socioeconomic History   • Marital status: Single     Spouse name: Not on file   • Number of children: 1   • Years of education: Not on file   • Highest education level: Not on file   Occupational History   • Occupation: construction    Tobacco Use   • Smoking status: Never Smoker   • Smokeless tobacco: Never Used   • Tobacco comment: daily  "caffine   Substance and Sexual Activity   • Alcohol use: Yes     Comment: OCCASIONALLY    • Drug use: Never   • Sexual activity: Yes       Family History   Problem Relation Age of Onset   • Alzheimer's disease Father    • Colon polyps Sister    • Heart attack Paternal Uncle    • Prostate cancer Neg Hx    • Colon cancer Neg Hx    • Malig Hyperthermia Neg Hx        REVIEW OF SYSTEMS    CONSTITUTIONAL: Denies fevers, chills, unintentional weight loss or weight gain  RESPIRATORY: Denies chronic cough or sob.   CARDIAC: Denies chest pain, palpitations, edema  GI: Denies dyspepsia, reflux, heartburn, nausea, vomiting, diarrhea or constipation  : Denies dysuria or hematuria.    MUSCULOSKELETAL: Denies muscle weakness and pain   NEURO: Denies chronic headaches.   ENDOCRINE: Denies significant heat or cold intolerance or history of thyroid problems.    DERM: no rashes,lesions or discharge.     Physical Examination  Ht 182.9 cm (72\")   Wt 85.7 kg (189 lb)   BMI 25.63 kg/m²   Body mass index is 25.63 kg/m².  GENERAL:alert, well appearing, and in no distress and oriented to person, place, and time  HEENT: normochephalic, atraumatic, no scleral icterus moist mucous membranes.  NECK: Supple there is no thyromegaly or lymphadenopathy  CHEST: clear to auscultation, no wheezes, rales or rhonchi, symmetric air entry.  Small subcutaneous mass in the mid chest without infection.  CARDIAC: regular rate and rhythm  ABDOMEN: soft, nontender, nondistended, no masses or organomegaly.  Over the suprapubic abdominal wall area there is a 1 x 1 cm soft tissue mass.  There is no tenderness  EXTREMITIES: no cyanosis, clubbing or edema.  Over the lower anterior thigh there is a raised mole that measures 4 mm, is round and symmetric with skin color  NEURO: alert and oriented, normal speech, cranial nerves 2-12 grossly intact, no focal deficits     Assessment:     The patient is a very pleasant 55 y.o. male with a subcutaneous mass located at  " chest, abdomen consistent with sebaceous cyst and ride thigh consistent with mole.  He is mildly symptomatic from them and desires excision.  Discussed with him about the benign nature of all the lumps and skin lesions.  We have agreed for surgical excision with local anesthesia.       Plan:     1. I have discussed treatment options consisting of observation or excision under local anesthesia.    2. Patient is inclined to proceed with excision.  3. Verbal patient instruction given.    Procedure:     PREOPERATIVE DIAGNOSES:    (1) Soft tissue mass mid chest, suprapubic abdominal wall and right anterior thigh mole    POSTOPERATIVE DIAGNOSES:    (1) Soft tissue mass mid chest of 1 x 1 cm, suprapubic abdominal wall of 2 x 2 cm and 5 mm mole anterior right thigh    PROCEDURE:    (1) Excision of soft tissue mass mid chest a 1 x 1 cm  (2) Excision of soft tissue mass suprapubic abdominal wall 2 x 2 cm  (3) Punch biopsy of right anterior thigh 5 mm skin lesion  (4) Layered closure    SURGEON/STAFF:  PORTIA Mcdaniels      NEEDLE AND SPONGE COUNT:  Correct.  SPECIMEN: Sebaceous cyst x2, skin lesion  INTRAOPERATIVE COMPLICATIONS:  None.   Andesthesia: local      OPERATION:  The patient was brought to the procedure room table and placed in the supine position.  I started by performing the right anterior thigh skin lesion punch biopsy.  With a 5 mm punch biopsy device and after the skin was prepped and draped in usual sterile fashion 2% lidocaine with epinephrine was injected subcutaneously.  The lesion was completely removed.  The wound was closed with interrupted 4-0 nylon.  There was covered with antibiotic ointment and Band-Aid.  Next the skin overlying the suprapubic abdominal wall soft tissue mass was prepped and draped in usual sterile fashion.  An elliptical incision of 2 x 2 cm was performed with scalpel after 2% lidocaine with epinephrine was injected.  Dissection was carried into subcutaneous tissue.  A 2 x 2 soft tissue mass  was encountered and dissected circumferentially.  Was completely excised and sent for pathology analysis.  Hemostasis was achieved.  The wound was closed in 2 layers with interrupted 2-0 Vicryl and running 4-0 Vicryl.  The wound was covered with surgical glue.  I continued the procedure by prepping and draping the mid anterior chest soft tissue mass.  A 1.5 x 1.5 cm elliptical incision was performed with scalpel and dissection was carried into the subcutaneous tissue after 2% lidocaine with epinephrine was injected.  The mass was seen confidentially dissected and he was found to be 1 x 1 cm consistent with a sebaceous cyst.  The mass was completely removed and sent for pathological analysis.  Hemostasis was achieved.  The wound was closed in 2 layers with interrupted 3-0 Vicryl and running 4-0 Monocryl.  The wound was covered with surgical glue.  The patient tolerated procedure well and was sent home in stable condition.  Instrument sponge count were correct    Instructions:  -Cannot shower for 48 hours  -Remove Band-Aid from anterior thigh in 48 hours, can shower after  -Can come to the office for suture removal in 7 days.  Patient states he can remove the suture at home.  Discussed with him about the need to come to the office having any problems with the wound.  -Call the office in 3 days for pathology report    Follow-up in my office as needed for wound problems    Carlito Mcdaniels MD, FACS  General, Minimally Invasive and Endoscopic Surgery  [facility]    [address], [address]  [place], [place], [address]  P: [phone]  F: [phone]

## 2020-10-30 LAB
LAB AP CASE REPORT: NORMAL
PATH REPORT.FINAL DX SPEC: NORMAL
PATH REPORT.GROSS SPEC: NORMAL

## 2020-11-02 ENCOUNTER — TELEPHONE (OUTPATIENT)
Dept: SURGERY | Facility: CLINIC | Age: 55
End: 2020-11-02

## 2020-11-02 NOTE — TELEPHONE ENCOUNTER
I called the patient regarding his biopsies report    Final Diagnosis   1. Skin, Right Thigh, Excision:  Benign skin with features of hypertrophic scar     Lou Mcmahon and  have seen this part in consult and concur.     2. Skin, Abdominal Wall, Excision:                 A. Epidermal inclusion cyst.     3. Skin, Chest Wall, Excision:               A. Epidermal inclusion cyst.     Patient will remove the sutures at home in a week, I offer him to come here if needed, I discussed with him about the benign nature of all the biopsies.  He understood

## 2020-11-13 ENCOUNTER — TELEPHONE (OUTPATIENT)
Dept: CARDIOLOGY | Facility: CLINIC | Age: 55
End: 2020-11-13

## 2020-11-13 ENCOUNTER — HOSPITAL ENCOUNTER (OUTPATIENT)
Dept: CARDIOLOGY | Facility: HOSPITAL | Age: 55
Discharge: HOME | End: 2020-11-13

## 2020-11-13 VITALS
HEIGHT: 72 IN | SYSTOLIC BLOOD PRESSURE: 148 MMHG | HEART RATE: 52 BPM | BODY MASS INDEX: 25.6 KG/M2 | WEIGHT: 189 LBS | DIASTOLIC BLOOD PRESSURE: 82 MMHG

## 2020-11-13 DIAGNOSIS — R07.2 PRECORDIAL PAIN: ICD-10-CM

## 2020-11-13 DIAGNOSIS — U07.1 COVID-19 VIRUS DETECTED: ICD-10-CM

## 2020-11-13 LAB
BH CV STRESS BP STAGE 1: NORMAL
BH CV STRESS BP STAGE 2: NORMAL
BH CV STRESS BP STAGE 3: NORMAL
BH CV STRESS BP STAGE 4: NORMAL
BH CV STRESS DURATION MIN STAGE 1: 3
BH CV STRESS DURATION MIN STAGE 2: 3
BH CV STRESS DURATION MIN STAGE 3: 3
BH CV STRESS DURATION MIN STAGE 4: 3
BH CV STRESS DURATION SEC STAGE 1: 0
BH CV STRESS DURATION SEC STAGE 2: 0
BH CV STRESS DURATION SEC STAGE 3: 0
BH CV STRESS DURATION SEC STAGE 4: 0
BH CV STRESS GRADE STAGE 1: 10
BH CV STRESS GRADE STAGE 2: 12
BH CV STRESS GRADE STAGE 3: 14
BH CV STRESS GRADE STAGE 4: 16
BH CV STRESS HR STAGE 1: 90
BH CV STRESS HR STAGE 2: 115
BH CV STRESS HR STAGE 3: 143
BH CV STRESS HR STAGE 4: 150
BH CV STRESS METS STAGE 1: 5
BH CV STRESS METS STAGE 2: 7.5
BH CV STRESS METS STAGE 3: 10
BH CV STRESS METS STAGE 4: 13.5
BH CV STRESS PROTOCOL 1: NORMAL
BH CV STRESS RECOVERY BP: NORMAL MMHG
BH CV STRESS RECOVERY HR: 78 BPM
BH CV STRESS SPEED STAGE 1: 1.7
BH CV STRESS SPEED STAGE 2: 2.5
BH CV STRESS SPEED STAGE 3: 3.4
BH CV STRESS SPEED STAGE 4: 4.2
BH CV STRESS STAGE 1: 1
BH CV STRESS STAGE 2: 2
BH CV STRESS STAGE 3: 3
BH CV STRESS STAGE 4: 4
MAXIMAL PREDICTED HEART RATE: 163 BPM
PERCENT MAX PREDICTED HR: 79.75 %
STRESS BASELINE BP: NORMAL MMHG
STRESS BASELINE HR: 60 BPM
STRESS PERCENT HR: 94 %
STRESS POST ESTIMATED WORKLOAD: 13 METS
STRESS POST EXERCISE DUR MIN: 12 MIN
STRESS POST EXERCISE DUR SEC: 0 SEC
STRESS POST PEAK BP: NORMAL MMHG
STRESS POST PEAK HR: 130 BPM
STRESS TARGET HR: 139 BPM

## 2020-11-13 PROCEDURE — 93356 MYOCRD STRAIN IMG SPCKL TRCK: CPT

## 2020-11-13 PROCEDURE — 93016 CV STRESS TEST SUPVJ ONLY: CPT | Performed by: INTERNAL MEDICINE

## 2020-11-13 PROCEDURE — 93018 CV STRESS TEST I&R ONLY: CPT | Performed by: INTERNAL MEDICINE

## 2020-11-13 PROCEDURE — 93017 CV STRESS TEST TRACING ONLY: CPT

## 2020-11-13 PROCEDURE — 93356 MYOCRD STRAIN IMG SPCKL TRCK: CPT | Performed by: INTERNAL MEDICINE

## 2020-11-13 PROCEDURE — 93306 TTE W/DOPPLER COMPLETE: CPT | Performed by: INTERNAL MEDICINE

## 2020-11-13 PROCEDURE — 93306 TTE W/DOPPLER COMPLETE: CPT

## 2020-11-13 NOTE — TELEPHONE ENCOUNTER
1.  Chest discomfort.  Atypical.  I do think it is reasonable to check a treadmill stress test as well as an echocardiogram given his recent diagnosis of Covid.  2.  Abnormal EKG.  Working to get that copy of the EKG faxed over so I can review it.  Today's EKG is normal.     If his echo and treadmill look okay, I would feel comfortable telling him he can be released to go back to his routine exercises.     Addendum: I was able to get a copy of the EKG.  It was read as a incomplete right bundle branch block.  However, in reviewing it myself, is a normal EKG and no significant difference from today's EKG.    Him know that his echocardiogram shows normal function with only mild mitral regurgitation.  Overall considered to be a normal echo.  Stress test does not suggest any significant blockages in his arteries.  I recommend that he go back to his usual exercise regimen.

## 2020-11-16 LAB
AORTIC ARCH: 3.1 CM
ASCENDING AORTA: 3.2 CM
BH CV ECHO MEAS - ACS: 2.4 CM
BH CV ECHO MEAS - AO ARCH DIAM (PROXIMAL TRANS.): 3.1 CM
BH CV ECHO MEAS - AO MAX PG (FULL): 3.3 MMHG
BH CV ECHO MEAS - AO MAX PG: 8.2 MMHG
BH CV ECHO MEAS - AO MEAN PG (FULL): 3 MMHG
BH CV ECHO MEAS - AO MEAN PG: 5 MMHG
BH CV ECHO MEAS - AO ROOT AREA (BSA CORRECTED): 1.8
BH CV ECHO MEAS - AO ROOT AREA: 11.3 CM^2
BH CV ECHO MEAS - AO ROOT DIAM: 3.8 CM
BH CV ECHO MEAS - AO V2 MAX: 143 CM/SEC
BH CV ECHO MEAS - AO V2 MEAN: 106 CM/SEC
BH CV ECHO MEAS - AO V2 VTI: 34.3 CM
BH CV ECHO MEAS - ASC AORTA: 3.2 CM
BH CV ECHO MEAS - AVA(I,A): 2.3 CM^2
BH CV ECHO MEAS - AVA(I,D): 2.3 CM^2
BH CV ECHO MEAS - AVA(V,A): 2.9 CM^2
BH CV ECHO MEAS - AVA(V,D): 2.9 CM^2
BH CV ECHO MEAS - BSA(HAYCOCK): 2.1 M^2
BH CV ECHO MEAS - BSA: 2.1 M^2
BH CV ECHO MEAS - BZI_BMI: 23.7 KILOGRAMS/M^2
BH CV ECHO MEAS - BZI_METRIC_HEIGHT: 185.4 CM
BH CV ECHO MEAS - BZI_METRIC_WEIGHT: 81.6 KG
BH CV ECHO MEAS - EDV(MOD-SP2): 142 ML
BH CV ECHO MEAS - EDV(MOD-SP4): 147 ML
BH CV ECHO MEAS - EDV(TEICH): 166.6 ML
BH CV ECHO MEAS - EF(CUBED): 67.2 %
BH CV ECHO MEAS - EF(MOD-BP): 62 %
BH CV ECHO MEAS - EF(MOD-SP2): 65.5 %
BH CV ECHO MEAS - EF(MOD-SP4): 61.2 %
BH CV ECHO MEAS - EF(TEICH): 58 %
BH CV ECHO MEAS - EF_3D-VOL: 57 %
BH CV ECHO MEAS - ESV(MOD-SP2): 49 ML
BH CV ECHO MEAS - ESV(MOD-SP4): 57 ML
BH CV ECHO MEAS - ESV(TEICH): 70 ML
BH CV ECHO MEAS - FS: 31 %
BH CV ECHO MEAS - IVS/LVPW: 1.1
BH CV ECHO MEAS - IVSD: 1 CM
BH CV ECHO MEAS - LA 3D VOL INDEX: 53
BH CV ECHO MEAS - LAT PEAK E' VEL: 11.2 CM/SEC
BH CV ECHO MEAS - LV DIASTOLIC VOL/BSA (35-75): 71.4 ML/M^2
BH CV ECHO MEAS - LV MASS(C)D: 218.1 GRAMS
BH CV ECHO MEAS - LV MASS(C)DI: 106 GRAMS/M^2
BH CV ECHO MEAS - LV MAX PG: 4.8 MMHG
BH CV ECHO MEAS - LV MEAN PG: 2 MMHG
BH CV ECHO MEAS - LV SYSTOLIC VOL/BSA (12-30): 27.7 ML/M^2
BH CV ECHO MEAS - LV V1 MAX: 110 CM/SEC
BH CV ECHO MEAS - LV V1 MEAN: 62.7 CM/SEC
BH CV ECHO MEAS - LV V1 VTI: 20.6 CM
BH CV ECHO MEAS - LVIDD: 5.8 CM
BH CV ECHO MEAS - LVIDS: 4 CM
BH CV ECHO MEAS - LVLD AP2: 9 CM
BH CV ECHO MEAS - LVLD AP4: 8.6 CM
BH CV ECHO MEAS - LVLS AP2: 7.4 CM
BH CV ECHO MEAS - LVLS AP4: 6.3 CM
BH CV ECHO MEAS - LVOT AREA (M): 3.8 CM^2
BH CV ECHO MEAS - LVOT AREA: 3.8 CM^2
BH CV ECHO MEAS - LVOT DIAM: 2.2 CM
BH CV ECHO MEAS - LVPWD: 0.9 CM
BH CV ECHO MEAS - MED PEAK E' VEL: 8.9 CM/SEC
BH CV ECHO MEAS - MV A DUR: 0.11 SEC
BH CV ECHO MEAS - MV A MAX VEL: 58.3 CM/SEC
BH CV ECHO MEAS - MV DEC SLOPE: 300 CM/SEC^2
BH CV ECHO MEAS - MV DEC TIME: 0.15 SEC
BH CV ECHO MEAS - MV E MAX VEL: 67.3 CM/SEC
BH CV ECHO MEAS - MV E/A: 1.2
BH CV ECHO MEAS - MV MAX PG: 1.7 MMHG
BH CV ECHO MEAS - MV MEAN PG: 1 MMHG
BH CV ECHO MEAS - MV P1/2T MAX VEL: 68.6 CM/SEC
BH CV ECHO MEAS - MV P1/2T: 67 MSEC
BH CV ECHO MEAS - MV V2 MAX: 64.7 CM/SEC
BH CV ECHO MEAS - MV V2 MEAN: 44.6 CM/SEC
BH CV ECHO MEAS - MV V2 VTI: 28.8 CM
BH CV ECHO MEAS - MVA P1/2T LCG: 3.2 CM^2
BH CV ECHO MEAS - MVA(P1/2T): 3.3 CM^2
BH CV ECHO MEAS - MVA(VTI): 2.7 CM^2
BH CV ECHO MEAS - PA MAX PG (FULL): 2.1 MMHG
BH CV ECHO MEAS - PA MAX PG: 3.3 MMHG
BH CV ECHO MEAS - PA V2 MAX: 90.3 CM/SEC
BH CV ECHO MEAS - PULM A REVS DUR: 0.11 SEC
BH CV ECHO MEAS - PULM A REVS VEL: 23.1 CM/SEC
BH CV ECHO MEAS - PULM DIAS VEL: 41.6 CM/SEC
BH CV ECHO MEAS - PULM S/D: 0.77
BH CV ECHO MEAS - PULM SYS VEL: 32.1 CM/SEC
BH CV ECHO MEAS - PVA(V,A): 2.5 CM^2
BH CV ECHO MEAS - PVA(V,D): 2.5 CM^2
BH CV ECHO MEAS - QP/QS: 0.74
BH CV ECHO MEAS - RV MAX PG: 1.2 MMHG
BH CV ECHO MEAS - RV MEAN PG: 1 MMHG
BH CV ECHO MEAS - RV V1 MAX: 54.8 CM/SEC
BH CV ECHO MEAS - RV V1 MEAN: 39.1 CM/SEC
BH CV ECHO MEAS - RV V1 VTI: 14 CM
BH CV ECHO MEAS - RVOT AREA: 4.2 CM^2
BH CV ECHO MEAS - RVOT DIAM: 2.3 CM
BH CV ECHO MEAS - SI(AO): 189.1 ML/M^2
BH CV ECHO MEAS - SI(CUBED): 63.7 ML/M^2
BH CV ECHO MEAS - SI(LVOT): 38.1 ML/M^2
BH CV ECHO MEAS - SI(MOD-SP2): 45.2 ML/M^2
BH CV ECHO MEAS - SI(MOD-SP4): 43.7 ML/M^2
BH CV ECHO MEAS - SI(TEICH): 46.9 ML/M^2
BH CV ECHO MEAS - SUP REN AO DIAM: 2.4 CM
BH CV ECHO MEAS - SV(AO): 389 ML
BH CV ECHO MEAS - SV(CUBED): 131.1 ML
BH CV ECHO MEAS - SV(LVOT): 78.3 ML
BH CV ECHO MEAS - SV(MOD-SP2): 93 ML
BH CV ECHO MEAS - SV(MOD-SP4): 90 ML
BH CV ECHO MEAS - SV(RVOT): 58.2 ML
BH CV ECHO MEAS - SV(TEICH): 96.6 ML
BH CV ECHO MEAS - TAPSE (>1.6): 2 CM
BH CV ECHO MEAS - TR MAX VEL: 169 CM/SEC
BH CV ECHO MEASUREMENTS AVERAGE E/E' RATIO: 6.7
BH CV XLRA - RV BASE: 3.5 CM
BH CV XLRA - RV LENGTH: 7.3 CM
BH CV XLRA - RV MID: 3 CM
BH CV XLRA - TDI S': 13.8 CM/SEC
LEFT ATRIUM VOLUME INDEX: 27 ML/M2
MAXIMAL PREDICTED HEART RATE: 163 BPM
SINUS: 3.2 CM
STJ: 3.2 CM
STRESS TARGET HR: 139 BPM

## 2021-03-24 ENCOUNTER — BULK ORDERING (OUTPATIENT)
Dept: CASE MANAGEMENT | Facility: OTHER | Age: 56
End: 2021-03-24

## 2021-03-24 DIAGNOSIS — Z23 IMMUNIZATION DUE: ICD-10-CM

## 2022-10-12 ENCOUNTER — OFFICE VISIT (OUTPATIENT)
Dept: INTERNAL MEDICINE | Age: 57
End: 2022-10-12

## 2022-10-12 ENCOUNTER — PATIENT ROUNDING (BHMG ONLY) (OUTPATIENT)
Dept: INTERNAL MEDICINE | Age: 57
End: 2022-10-12

## 2022-10-12 VITALS
TEMPERATURE: 98.3 F | HEIGHT: 72 IN | OXYGEN SATURATION: 97 % | WEIGHT: 192.6 LBS | DIASTOLIC BLOOD PRESSURE: 72 MMHG | SYSTOLIC BLOOD PRESSURE: 148 MMHG | BODY MASS INDEX: 26.09 KG/M2 | HEART RATE: 52 BPM

## 2022-10-12 DIAGNOSIS — D22.9 ATYPICAL MOLE: ICD-10-CM

## 2022-10-12 DIAGNOSIS — Z00.00 ROUTINE ADULT HEALTH MAINTENANCE: ICD-10-CM

## 2022-10-12 DIAGNOSIS — M1A.09X1 IDIOPATHIC CHRONIC GOUT OF MULTIPLE SITES WITH TOPHUS: ICD-10-CM

## 2022-10-12 DIAGNOSIS — M67.431 GANGLION OF RIGHT WRIST: Primary | ICD-10-CM

## 2022-10-12 DIAGNOSIS — M15.9 PRIMARY OSTEOARTHRITIS INVOLVING MULTIPLE JOINTS: ICD-10-CM

## 2022-10-12 DIAGNOSIS — L40.9 PSORIASIS OF SCALP: ICD-10-CM

## 2022-10-12 PROBLEM — M15.0 PRIMARY OSTEOARTHRITIS INVOLVING MULTIPLE JOINTS: Status: ACTIVE | Noted: 2022-10-12

## 2022-10-12 LAB
ALBUMIN SERPL-MCNC: 4.2 G/DL (ref 3.5–5.2)
ALBUMIN/GLOB SERPL: 2.2 G/DL
ALP SERPL-CCNC: 64 U/L (ref 39–117)
ALT SERPL-CCNC: 13 U/L (ref 1–41)
AST SERPL-CCNC: 14 U/L (ref 1–40)
BASOPHILS # BLD AUTO: 0.05 10*3/MM3 (ref 0–0.2)
BASOPHILS NFR BLD AUTO: 1.1 % (ref 0–1.5)
BILIRUB SERPL-MCNC: 0.4 MG/DL (ref 0–1.2)
BUN SERPL-MCNC: 10 MG/DL (ref 6–20)
BUN/CREAT SERPL: 11 (ref 7–25)
CALCIUM SERPL-MCNC: 9.4 MG/DL (ref 8.6–10.5)
CHLORIDE SERPL-SCNC: 106 MMOL/L (ref 98–107)
CHOLEST SERPL-MCNC: 198 MG/DL (ref 0–200)
CO2 SERPL-SCNC: 27.8 MMOL/L (ref 22–29)
CREAT SERPL-MCNC: 0.91 MG/DL (ref 0.76–1.27)
EGFRCR SERPLBLD CKD-EPI 2021: 97.7 ML/MIN/1.73
EOSINOPHIL # BLD AUTO: 0.3 10*3/MM3 (ref 0–0.4)
EOSINOPHIL NFR BLD AUTO: 6.3 % (ref 0.3–6.2)
ERYTHROCYTE [DISTWIDTH] IN BLOOD BY AUTOMATED COUNT: 12.8 % (ref 12.3–15.4)
GLOBULIN SER CALC-MCNC: 1.9 GM/DL
GLUCOSE SERPL-MCNC: 103 MG/DL (ref 65–99)
HCT VFR BLD AUTO: 42 % (ref 37.5–51)
HDLC SERPL-MCNC: 55 MG/DL (ref 40–60)
HGB BLD-MCNC: 14.9 G/DL (ref 13–17.7)
IMM GRANULOCYTES # BLD AUTO: 0.01 10*3/MM3 (ref 0–0.05)
IMM GRANULOCYTES NFR BLD AUTO: 0.2 % (ref 0–0.5)
LDLC SERPL CALC-MCNC: 122 MG/DL (ref 0–100)
LYMPHOCYTES # BLD AUTO: 1.21 10*3/MM3 (ref 0.7–3.1)
LYMPHOCYTES NFR BLD AUTO: 25.6 % (ref 19.6–45.3)
MCH RBC QN AUTO: 30.5 PG (ref 26.6–33)
MCHC RBC AUTO-ENTMCNC: 35.5 G/DL (ref 31.5–35.7)
MCV RBC AUTO: 85.9 FL (ref 79–97)
MONOCYTES # BLD AUTO: 0.28 10*3/MM3 (ref 0.1–0.9)
MONOCYTES NFR BLD AUTO: 5.9 % (ref 5–12)
NEUTROPHILS # BLD AUTO: 2.88 10*3/MM3 (ref 1.7–7)
NEUTROPHILS NFR BLD AUTO: 60.9 % (ref 42.7–76)
NRBC BLD AUTO-RTO: 0 /100 WBC (ref 0–0.2)
PLATELET # BLD AUTO: 193 10*3/MM3 (ref 140–450)
POTASSIUM SERPL-SCNC: 4.6 MMOL/L (ref 3.5–5.2)
PROT SERPL-MCNC: 6.1 G/DL (ref 6–8.5)
RBC # BLD AUTO: 4.89 10*6/MM3 (ref 4.14–5.8)
SODIUM SERPL-SCNC: 142 MMOL/L (ref 136–145)
TRIGL SERPL-MCNC: 117 MG/DL (ref 0–150)
URATE SERPL-MCNC: 6.7 MG/DL (ref 3.4–7)
VLDLC SERPL CALC-MCNC: 21 MG/DL (ref 5–40)
WBC # BLD AUTO: 4.73 10*3/MM3 (ref 3.4–10.8)

## 2022-10-12 PROCEDURE — 99214 OFFICE O/P EST MOD 30 MIN: CPT | Performed by: NURSE PRACTITIONER

## 2022-10-12 RX ORDER — VALACYCLOVIR HYDROCHLORIDE 500 MG/1
500 TABLET, FILM COATED ORAL 2 TIMES DAILY
Qty: 6 TABLET | Refills: 0 | Status: SHIPPED | OUTPATIENT
Start: 2022-10-12 | End: 2022-10-15

## 2022-10-12 NOTE — PROGRESS NOTES
A My-Chart message has been sent to the patient for [place] [facility]

## 2022-10-12 NOTE — PROGRESS NOTES
I N T E R N A L  M E D I C I N E  Yenni Alexander, APRN    ENCOUNTER DATE:  10/12/2022    Serafin Hartmann / 56 y.o. / male      CHIEF COMPLAINT / REASON FOR OFFICE VISIT     Hair/Scalp Problem (Psoriasis ), finger nail (Possible splinter? Right index finger), Wrist Pain (Lump, possible cyst?), Toe Pain (Sporadic, possible gout? Right side ), and Nose Problem (Black los on bridge of nose)      ASSESSMENT & PLAN     Diagnoses and all orders for this visit:    1. Ganglion of right wrist (Primary)  -     Ambulatory Referral to Hand Surgery    2. Idiopathic chronic gout of multiple sites with tophus  -     Uric acid    3. Atypical mole  -     Ambulatory Referral to Dermatology    4. Primary osteoarthritis involving multiple joints  -     CBC w AUTO Differential  -     Comprehensive metabolic panel    5. Routine adult health maintenance  Overview:  Added automatically from request for surgery [id number]    Orders:  -     Lipid panel    6. Psoriasis of scalp    Other orders  -     valACYclovir (Valtrex) 500 MG tablet; Take 1 tablet by mouth 2 (Two) Times a Day for 3 days.  Dispense: 6 tablet; Refill: 0       SUMMARY/DISCUSSION  • Discussion of chronic Psoriasis unchanged over several years. Discussion of Psoriasis scalp shampoo and purpose  • Discussion of Idiopathic gout and prevention through decreasing uric acid in body, decrease alcohol consumption  • Referral to Hand surgeon for ganglion cyst right wrist  • Referral to Dermatology for mole on tip of nose and to look at nail on right hand- has a chronic area of discoloration  • Hx of Genital Herpes- states not active at this time, request for Valacyclovir if needed  • Discussion of Shingles vaccine and to get at local pharmacy  • I spent 30 min in direct care of this patient on this date of service. This time includes times spent by me in the following activities: Preparing for the visit, obtaining and/or reviewing a separately obtained history, performing a medically  "appropriate examination and/or evaluation, reviewing medical records, reviewing tests, ordering medications, tests, or procedures, counseling and educating the patient, documenting information in the medical record and reviewing office note/correspondence from other providers.     Return in about 6 months (around 4/12/2023) for Next scheduled follow up.      VITAL SIGNS     Visit Vitals  /72 (BP Location: Left arm, Patient Position: Sitting, Cuff Size: Adult)   Pulse 52   Temp 98.3 °F (36.8 °C) (Oral)   Ht 182.9 cm (72\")   Wt 87.4 kg (192 lb 9.6 oz)   SpO2 97%   BMI 26.12 kg/m²           BP Readings from Last 3 Encounters:   10/12/22 148/72   09/02/21 149/92   11/13/20 148/82     Wt Readings from Last 3 Encounters:   10/12/22 87.4 kg (192 lb 9.6 oz)   11/13/20 85.7 kg (189 lb)   10/28/20 85.7 kg (189 lb)     Body mass index is 26.12 kg/m².    Blood pressure readings recorded on patient flowsheet:  No flowsheet data found.          MEDICATIONS AT THE TIME OF OFFICE VISIT     Current Outpatient Medications on File Prior to Visit   Medication Sig Dispense Refill   • ibuprofen (ADVIL,MOTRIN) 400 MG tablet Take 400 mg by mouth Every 6 (Six) Hours As Needed.     • Multiple Vitamins-Minerals (MULTIVITAMIN ADULT PO) Take 1 tablet by mouth Daily.     • allopurinol (Zyloprim) 100 MG tablet Take 1 tablet by mouth Daily. 21 tablet 0     No current facility-administered medications on file prior to visit.        HISTORY OF PRESENT ILLNESS     56 year old male being seen today to establish care with New PCP, former patient of HALEIGH Ridley. Patient with chronic medical concerns including Gout flare up, mole on top of nose, nail bed lesion, ganglion cyst of right wrist, Tinea Psoriasis, Osteoarthritis of multiple joints, Genital Herpes Hx.   Patient with Recent COVID-19 and had chest pain- evaluated by Cardiology with EKG/Stress test/ECHO and no concerns seen and no further chest pain voiced.     Patient Care " Team:  Yenni Alexander APRN as PCP - General (Family Medicine)    REVIEW OF SYSTEMS     Review of Systems   Constitutional: Negative.    HENT: Negative.    Eyes: Negative.    Respiratory: Negative.    Cardiovascular: Negative.    Gastrointestinal: Negative.    Genitourinary: Negative.  Negative for genital sores.   Musculoskeletal: Positive for arthralgias and myalgias.   Skin: Positive for color change.        Nail bed lesion, chronic  Area of scalp Psoriasis above ear- chronic and unchanged  Ganglion cyst right wrist   Neurological: Negative.    Hematological: Negative.    Psychiatric/Behavioral: Negative.           PHYSICAL EXAMINATION     Physical Exam  Cardiovascular:      Rate and Rhythm: Normal rate and regular rhythm.      Pulses: Normal pulses.      Heart sounds: Normal heart sounds.   Pulmonary:      Effort: Pulmonary effort is normal.      Breath sounds: Normal breath sounds.   Musculoskeletal:         General: Normal range of motion.   Skin:     General: Skin is warm and dry.      Findings: Lesion present.      Comments: Lesion top of nose and discoloration right middle nail bed  Chronic Scalp Psoriasis above ear, unchanged per patient and chronic    Neurological:      Mental Status: He is alert and oriented to person, place, and time.             REVIEWED DATA     Labs:     Lab Results   Component Value Date     08/25/2020    K 4.6 08/25/2020    CALCIUM 9.1 08/25/2020    AST 19 08/25/2020    ALT 18 08/25/2020    BUN 17 08/25/2020    CREATININE 0.87 08/25/2020    EGFRIFNONA 91 08/25/2020    EGFRIFAFRI 110 08/25/2020       Lab Results   Component Value Date    HGBA1C 5.20 08/25/2020       Lab Results   Component Value Date    LDL 99 08/25/2020    HDL 63 (H) 08/25/2020    TRIG 84 08/25/2020       Lab Results   Component Value Date    TSH 1.600 08/25/2020       Lab Results   Component Value Date    WBC 5.77 10/20/2020    HGB 14.6 10/20/2020     10/20/2020       No results found for:  MALBCRERATIO       Imaging:           Medical Tests:           Summary of old records / correspondence / consultant report:           Request outside records:           HALEIGH Malin

## 2022-10-13 DIAGNOSIS — M10.9 ACUTE GOUT OF RIGHT KNEE, UNSPECIFIED CAUSE: ICD-10-CM

## 2022-10-13 RX ORDER — ALLOPURINOL 100 MG/1
100 TABLET ORAL DAILY
Qty: 21 TABLET | Refills: 0 | Status: SHIPPED | OUTPATIENT
Start: 2022-10-13 | End: 2023-01-05

## 2023-01-05 ENCOUNTER — OFFICE VISIT (OUTPATIENT)
Dept: INTERNAL MEDICINE | Age: 58
End: 2023-01-05
Payer: MEDICAID

## 2023-01-05 ENCOUNTER — TELEPHONE (OUTPATIENT)
Dept: INTERNAL MEDICINE | Age: 58
End: 2023-01-05
Payer: MEDICAID

## 2023-01-05 VITALS
HEIGHT: 72 IN | BODY MASS INDEX: 26.9 KG/M2 | OXYGEN SATURATION: 98 % | TEMPERATURE: 96.9 F | SYSTOLIC BLOOD PRESSURE: 138 MMHG | WEIGHT: 198.6 LBS | HEART RATE: 60 BPM | DIASTOLIC BLOOD PRESSURE: 76 MMHG

## 2023-01-05 DIAGNOSIS — M10.061 ACUTE IDIOPATHIC GOUT OF RIGHT KNEE: Primary | ICD-10-CM

## 2023-01-05 DIAGNOSIS — M10.9 GOUT OF MULTIPLE SITES, UNSPECIFIED CAUSE, UNSPECIFIED CHRONICITY: ICD-10-CM

## 2023-01-05 PROCEDURE — 99213 OFFICE O/P EST LOW 20 MIN: CPT | Performed by: NURSE PRACTITIONER

## 2023-01-05 RX ORDER — ALLOPURINOL 100 MG/1
100 TABLET ORAL DAILY
Qty: 30 TABLET | Refills: 3 | Status: SHIPPED | OUTPATIENT
Start: 2023-01-05

## 2023-01-05 RX ORDER — COLCHICINE 0.6 MG/1
TABLET ORAL
Qty: 9 TABLET | Refills: 0 | Status: SHIPPED | OUTPATIENT
Start: 2023-01-05

## 2023-01-05 RX ORDER — PREDNISONE 10 MG/1
40 TABLET ORAL DAILY
Qty: 20 TABLET | Refills: 0 | Status: SHIPPED | OUTPATIENT
Start: 2023-01-05 | End: 2023-01-10

## 2023-01-05 NOTE — PROGRESS NOTES
I N T E R N A L  M E D I C I N E  ART GARCIA, APRN      ENCOUNTER DATE:  2023    Serafin Caryor / 57 y.o. / male      CHIEF COMPLAINT / REASON FOR OFFICE VISIT     Knee Pain      ASSESSMENT & PLAN     Problem List Items Addressed This Visit    None  Visit Diagnoses     Acute idiopathic gout of right knee    -  Primary    Relevant Medications    predniSONE (DELTASONE) 10 MG tablet    colchicine 0.6 MG tablet    Gout of multiple sites, unspecified cause, unspecified chronicity        Relevant Medications    allopurinol (Zyloprim) 100 MG tablet        No orders of the defined types were placed in this encounter.    New Medications Ordered This Visit   Medications   • predniSONE (DELTASONE) 10 MG tablet     Sig: Take 4 tablets by mouth Daily for 5 days.     Dispense:  20 tablet     Refill:  0   • colchicine 0.6 MG tablet     Si tabs by mouth and 1 tab 1 hr later, 12 hrs later take 1 tab. take 1 tab 1-2 times a day until 2 days after symptoms resolve     Dispense:  9 tablet     Refill:  0   • allopurinol (Zyloprim) 100 MG tablet     Sig: Take 1 tablet by mouth Daily.     Dispense:  30 tablet     Refill:  3       SUMMARY/DISCUSSION  • Advised patient after full resolution of gout symptoms to restart allopurinol 100 mg daily and keep appointment with PCP in April for dose adjustments.    Next Appointment with me: Visit date not found    Return for Next scheduled follow up with PCP.      VITAL SIGNS     Visit Vitals  /76   Pulse 60   Temp 96.9 °F (36.1 °C) (Temporal)   Ht 182.9 cm (72\")   Wt 90.1 kg (198 lb 9.6 oz)   SpO2 98%   BMI 26.94 kg/m²     Wt Readings from Last 3 Encounters:   23 90.1 kg (198 lb 9.6 oz)   10/12/22 87.4 kg (192 lb 9.6 oz)   20 85.7 kg (189 lb)     Body mass index is 26.94 kg/m².      MEDICATIONS AT THE TIME OF OFFICE VISIT     Current Outpatient Medications on File Prior to Visit   Medication Sig   • ibuprofen (ADVIL,MOTRIN) 400 MG tablet Take 400 mg by mouth Every 6  (Six) Hours As Needed.   • Multiple Vitamins-Minerals (MULTIVITAMIN ADULT PO) Take 1 tablet by mouth Daily.   • [DISCONTINUED] allopurinol (Zyloprim) 100 MG tablet Take 1 tablet by mouth Daily.     No current facility-administered medications on file prior to visit.          HISTORY OF PRESENT ILLNESS     Patient presents with mild swelling, warmth and erythema to the right knee.  Known history of gout to the right knee and large toe.  Reported red wine during the holidays with subsequent development of gout-like symptoms.  He was recently placed back on allopurinol 21-day dose by PCP.  Uric acid was 6.7.    REVIEW OF SYSTEMS     Constitutional neg except per HPI   Resp neg  CV neg  Musc right knee pain    PHYSICAL EXAMINATION     Physical Exam   Swelling and erythema present of the right knee. No deformity, effusion or lacerations. Normal range of motion. Tenderness (mild tenderness around patella ) present. No medial joint line or lateral joint line tenderness.    REVIEWED DATA     Labs:   Lab Results   Component Value Date    GLUCOSE 103 (H) 10/12/2022    BUN 10 10/12/2022    CREATININE 0.91 10/12/2022    EGFRIFNONA 91 08/25/2020    EGFRIFAFRI 110 08/25/2020    BCR 11.0 10/12/2022    K 4.6 10/12/2022    CO2 27.8 10/12/2022    CALCIUM 9.4 10/12/2022    PROTENTOTREF 6.1 10/12/2022    ALBUMIN 4.20 10/12/2022    LABIL2 2.2 10/12/2022    AST 14 10/12/2022    ALT 13 10/12/2022     Uric Acid   Date Value Ref Range Status   10/12/2022 6.7 3.4 - 7.0 mg/dL Final       Imaging:           Medical Tests:           Summary of old records / correspondence / consultant report:           Request outside records:             *Examiner was wearing medical surgical mask.     **Dragon dictation used for documentation

## 2023-01-05 NOTE — TELEPHONE ENCOUNTER
Caller: Mary KateSerafin    Relationship: Self    Best call back number: [phone]    What medication are you requesting: PREDNISONE, COLCHICINE     What are your current symptoms: GOUT IN KNEE     How long have you been experiencing symptoms:     Have you had these symptoms before:    [] Yes  [] No    Have you been treated for these symptoms before:   [] Yes  [] No    If a prescription is needed, what is your preferred pharmacy and phone number: [id number] - [facility] [address] AT [facility] [address] - [phone] [facility] [phone] FX     Additional notes: PATIENT STATES HE HAS A GOUT FLARE UP IN HIS KNEE.   PLEASE ADVISE

## 2023-01-05 NOTE — TELEPHONE ENCOUNTER
YASMINETVM advising pt that c/o gout require an appointment by the providers covering. Pt instructed to call the office at [phone] and jasen appt with a provider who has an opening, no meds will be given at this time. MM

## 2023-01-27 ENCOUNTER — TELEPHONE (OUTPATIENT)
Dept: INTERNAL MEDICINE | Age: 58
End: 2023-01-27
Payer: MEDICAID

## 2023-01-27 RX ORDER — VALACYCLOVIR HYDROCHLORIDE 500 MG/1
500 TABLET, FILM COATED ORAL 2 TIMES DAILY
Qty: 30 TABLET | Refills: 3 | Status: SHIPPED | OUTPATIENT
Start: 2023-01-27

## 2023-01-27 NOTE — TELEPHONE ENCOUNTER
Caller: Serafin Hartmann    Relationship: Self    Best call back number: [phone]    What medication are you requesting: VALACYCLOVIR     What are your current symptoms: FLARE UP     If a prescription is needed, what is your preferred pharmacy and phone number: [facility] #[id number] [place], [place] - [address] AT [address] - [phone]  - [phone] FX     Additional notes:

## 2023-04-12 ENCOUNTER — TELEPHONE (OUTPATIENT)
Dept: INTERNAL MEDICINE | Age: 58
End: 2023-04-12

## 2023-04-13 DIAGNOSIS — M10.9 GOUT OF MULTIPLE SITES, UNSPECIFIED CAUSE, UNSPECIFIED CHRONICITY: ICD-10-CM

## 2023-04-13 DIAGNOSIS — M67.431 GANGLION OF RIGHT WRIST: Primary | ICD-10-CM

## 2023-04-13 RX ORDER — ALLOPURINOL 100 MG/1
100 TABLET ORAL DAILY
Qty: 30 TABLET | Refills: 3 | Status: SHIPPED | OUTPATIENT
Start: 2023-04-13

## 2023-04-13 NOTE — TELEPHONE ENCOUNTER
Pt was not seen in office on 4/12/2023 due to provider being out sick, pt informed me he did not feel like he needed a follow up this soon, pt also stated that he feels like the allipurinol was working very well and would like to do his follow up in 6 mths.

## 2023-05-19 ENCOUNTER — OFFICE VISIT (OUTPATIENT)
Dept: ORTHOPEDIC SURGERY | Facility: CLINIC | Age: 58
End: 2023-05-19
Payer: MEDICAID

## 2023-05-19 ENCOUNTER — PATIENT ROUNDING (BHMG ONLY) (OUTPATIENT)
Dept: ORTHOPEDIC SURGERY | Facility: CLINIC | Age: 58
End: 2023-05-19
Payer: MEDICAID

## 2023-05-19 VITALS — BODY MASS INDEX: 25.35 KG/M2 | WEIGHT: 191.3 LBS | TEMPERATURE: 97.1 F | HEIGHT: 73 IN

## 2023-05-19 DIAGNOSIS — R52 PAIN: Primary | ICD-10-CM

## 2023-05-19 DIAGNOSIS — M67.431 GANGLION OF RIGHT WRIST: ICD-10-CM

## 2023-05-19 PROCEDURE — 1160F RVW MEDS BY RX/DR IN RCRD: CPT | Performed by: ORTHOPAEDIC SURGERY

## 2023-05-19 PROCEDURE — 1159F MED LIST DOCD IN RCRD: CPT | Performed by: ORTHOPAEDIC SURGERY

## 2023-05-19 RX ORDER — CLOBETASOL PROPIONATE 0.46 MG/ML
SOLUTION TOPICAL
COMMUNITY
Start: 2023-01-23

## 2023-05-19 RX ORDER — KETOCONAZOLE 20 MG/ML
SHAMPOO TOPICAL
COMMUNITY
Start: 2023-01-23

## 2023-05-19 NOTE — PROGRESS NOTES
Patient: Serafin Hartmann    YOB: 1965    Medical Record Number: [id number]    Chief Complaints: Right wrist mass    History of Present Illness:     57 y.o. male patient who presents for evaluation of a right wrist mass.  He first noticed the mass last summer.  He is an avid cyclist and it bothers him when he rests his wrist as he cycles.  That is really the only time the wrist bothers him.  Day-to-day he has no pain or other symptoms.  He is able to do everything that he wants to do with his right hand.  The mass has slowly gotten a little bit larger over time and so he wanted to get this checked out.  Denies any other associated complaints or issues.  He says he has had similar cyst removed in other places on his body.  He mentions incidentally that his ex-wife also had a couple of cyst removed from her fingers.    Allergies   Allergen Reactions   • Oxycodone-Acetaminophen Nausea And Vomiting and GI Intolerance       Home Medications:      Current Outpatient Medications:   •  allopurinol (Zyloprim) 100 MG tablet, Take 1 tablet by mouth Daily., Disp: 30 tablet, Rfl: 3  •  clobetasol (TEMOVATE) 0.05 % external solution, , Disp: , Rfl:   •  GLUCOSAMINE-CHONDROITIN PO, Take  by mouth., Disp: , Rfl:   •  ibuprofen (ADVIL,MOTRIN) 400 MG tablet, Take 1 tablet by mouth Every 6 (Six) Hours As Needed., Disp: , Rfl:   •  ketoconazole (NIZORAL) 2 % shampoo, , Disp: , Rfl:   •  Multiple Vitamins-Minerals (MULTIVITAMIN ADULT PO), Take 1 tablet by mouth Daily., Disp: , Rfl:   •  Probiotic Product (PROBIOTIC DAILY PO), Take  by mouth., Disp: , Rfl:   •  triamcinolone (KENALOG) 0.1 % ointment, , Disp: , Rfl:   •  valACYclovir (Valtrex) 500 MG tablet, Take 1 tablet by mouth 2 (Two) Times a Day., Disp: 30 tablet, Rfl: 3    Past Medical History:   Diagnosis Date   • Allergic    • GERD (gastroesophageal reflux disease)    • Prostatitis        Past Surgical History:   Procedure Laterality Date   • COLONOSCOPY N/A  10/13/2020    Procedure: COLONOSCOPY INTO CECUM WITH POLYPECTOMY X 3, CLIP X 1;  Surgeon: Carlito Mcdaniels MD;  Location: [facility] ENDOSCOPY;  Service: General;  Laterality: N/A;  pre: screening  post: colon polyps x 3   • HAND SURGERY Right    • HIP SURGERY     • KNEE SURGERY Right        Social History     Occupational History   • Occupation: construction    Tobacco Use   • Smoking status: Never     Passive exposure: Never   • Smokeless tobacco: Never   • Tobacco comments:     daily caffine   Vaping Use   • Vaping Use: Never used   Substance and Sexual Activity   • Alcohol use: Yes     Comment: OCCASIONALLY    • Drug use: Never   • Sexual activity: Yes      Social History     Social History Narrative   • Not on file       Family History   Problem Relation Age of Onset   • Alzheimer's disease Father    • Colon polyps Sister    • Heart attack Paternal Uncle    • Prostate cancer Neg Hx    • Colon cancer Neg Hx    • Malig Hyperthermia Neg Hx        Review of Systems:      Constitutional: Denies fever, shaking or chills   Eyes: Denies change in visual acuity   HEENT: Denies nasal congestion or sore throat   Respiratory: Denies cough or shortness of breath   Cardiovascular: Denies chest pain or edema  Endocrine: Denies tremors, palpitations, intolerance of heat or cold, polyuria, polydipsia.  GI: Denies abdominal pain, nausea, vomiting, bloody stools or diarrhea  : Denies frequency, urgency, incontinence, retention, or nocturia.  Musculoskeletal: Denies numbness, tingling or loss of motor function except as above  Integument: Denies rash, lesion or ulceration   Neurologic: Denies headache or focal weakness, deficits  Heme: Denies spontaneous or excessive bleeding, epistaxis, hematuria, melena, fatigue, enlarged or tender lymph nodes.      All other pertinent positives and negatives as noted above in HPI.    Physical Exam: 57 y.o. male    Vitals:    05/19/23 0823   Temp: 97.1 °F (36.2 °C)   Weight: 86.8 kg (191  "lb 4.8 oz)   Height: 185.4 cm (73\")     General:  Patient is awake and alert.  Appears in no acute distress or discomfort.    Psych:  Affect and demeanor are appropriate.    Eyes:  Conjunctiva and sclera appear grossly normal.  Eyes track well and EOM seem to be intact.    Ears:  No gross abnormalities.  Hearing adequate for the exam.    Cardiovascular:  Regular rate and rhythm.    Lungs:  Good chest expansion.  Breathing unlabored.    Lymph:  No palpable adenopathy in the affected extremity    Extremities: Right wrist is examined.  Skin is benign.  He has what appears to be a mass of the volar aspect of the wrist on the radial side, just overlying chronic.  On palpation, the mass is nontender.  It is nonpulsatile.  I can feel his radial artery just adjacent to the cyst but given the cyst itself is nonpulsatile.  He has good wrist motion.  He can , pinch, abduct his fingers and thumb as well as flex and extend his wrist with good strength and no pain.  Intact sensation.  Brisk cap refill.         Radiology:   AP, oblique and lateral views right wrist are ordered and reviewed to evaluate his complaint.  No comparison films are available.  I do not see any concerning findings.    Assessment/Plan: Right wrist mass, suspected ganglion cyst    I told him that I suspect this is a cyst although I cannot exclude possibility is a tumor.  Given the longevity of the issue, I think it is almost certainly benign.  We talked about the option of further work-up versus expectant management versus aspiration/injection versus open removal and biopsy.  I told him that if he wants to get it removed I would recommend sending him to a hand surgeon due to the proximity to the radial artery.  All options were thoroughly discussed.  He had some questions that I answered.  He says it really does not bother him and he can live with it for now.  He has other issues that are more bothersome for him including his back and shoulder.  He is " open to make an appointment to come see me for those other issues.  I told him he is welcome to do so.  I told him to keep an eye on the cyst.  If it is changing in size or character, I told him to let me know and I will be happy to refer him to a hand specialist or refer him for further work-up.  Otherwise, he can follow-up as needed.    Ashre Bansal MD    05/19/2023    CC to Yenni Alexander APRN

## 2023-05-19 NOTE — PROGRESS NOTES
A [employer] Message has been sent to the patient for PATIENT ROUNDING [place] [facility]

## 2023-06-14 DIAGNOSIS — M10.9 GOUT OF MULTIPLE SITES, UNSPECIFIED CAUSE, UNSPECIFIED CHRONICITY: ICD-10-CM

## 2023-06-14 RX ORDER — ALLOPURINOL 100 MG/1
100 TABLET ORAL DAILY
Qty: 30 TABLET | Refills: 3 | Status: SHIPPED | OUTPATIENT
Start: 2023-06-14

## 2023-06-16 ENCOUNTER — OFFICE VISIT (OUTPATIENT)
Dept: ORTHOPEDIC SURGERY | Facility: CLINIC | Age: 58
End: 2023-06-16
Payer: MEDICAID

## 2023-06-16 VITALS — WEIGHT: 185 LBS | BODY MASS INDEX: 24.52 KG/M2 | HEIGHT: 73 IN | TEMPERATURE: 96.6 F

## 2023-06-16 DIAGNOSIS — R52 PAIN: Primary | ICD-10-CM

## 2023-06-16 DIAGNOSIS — M25.511 CHRONIC RIGHT SHOULDER PAIN: ICD-10-CM

## 2023-06-16 DIAGNOSIS — G89.29 CHRONIC RIGHT SHOULDER PAIN: ICD-10-CM

## 2023-06-16 DIAGNOSIS — M67.431 GANGLION OF RIGHT WRIST: ICD-10-CM

## 2023-06-16 RX ORDER — LIDOCAINE HYDROCHLORIDE 10 MG/ML
INJECTION, SOLUTION EPIDURAL; INFILTRATION; INTRACAUDAL; PERINEURAL
Status: COMPLETED | OUTPATIENT
Start: 2023-06-16 | End: 2023-06-16

## 2023-06-16 RX ORDER — METHYLPREDNISOLONE ACETATE 80 MG/ML
INJECTION, SUSPENSION INTRA-ARTICULAR; INTRALESIONAL; INTRAMUSCULAR; SOFT TISSUE
Status: COMPLETED | OUTPATIENT
Start: 2023-06-16 | End: 2023-06-16

## 2023-06-16 RX ADMIN — LIDOCAINE HYDROCHLORIDE: 10 INJECTION, SOLUTION EPIDURAL; INFILTRATION; INTRACAUDAL; PERINEURAL at 09:42

## 2023-06-16 RX ADMIN — METHYLPREDNISOLONE ACETATE: 80 INJECTION, SUSPENSION INTRA-ARTICULAR; INTRALESIONAL; INTRAMUSCULAR; SOFT TISSUE at 09:48

## 2023-06-16 RX ADMIN — LIDOCAINE HYDROCHLORIDE: 10 INJECTION, SOLUTION EPIDURAL; INFILTRATION; INTRACAUDAL; PERINEURAL at 09:48

## 2023-06-16 RX ADMIN — METHYLPREDNISOLONE ACETATE: 80 INJECTION, SUSPENSION INTRA-ARTICULAR; INTRALESIONAL; INTRAMUSCULAR; SOFT TISSUE at 09:42

## 2023-06-16 NOTE — PROGRESS NOTES
Patient:Serafin Hartmann    YOB: 1965    Medical Record Number:[id number]    Chief Complaints:  New complaint right shoulder pain, follow up right wrist ganglion    History of Present Illness:     57 y.o. male patient who presents for a couple of issues.  The first is his right shoulder.  He tells me this has been a longstanding issue for him, dating back to when his son was young.  He says that he was doing a lot of lifting and he thinks that it was the repetitive lifting that first aggravated the shoulder.  It has continued to bother him over the years.  The symptoms seem to have slowly gotten worse.  Most of the pain is anterior and anterolateral.  He describes it as deep.  He is an avid cyclist and when he is climbing and leaning over the bike, he does get some pain that radiates over the top of the shoulder even up towards his neck on occasion.  He says that he has trouble lifting heavy objects with his right arm.  He has some degree of pain there all the time but it is worse with certain activities including reaching and lifting.  Denies mechanical phenomenon.  Denies instability.    The second issue is his right wrist.  He feels like the swelling has gotten a little worse.  He would like to try the aspiration that we had previously discussed.    Allergies   Allergen Reactions   • Oxycodone-Acetaminophen Nausea And Vomiting and GI Intolerance       Current Outpatient Medications:   •  allopurinol (ZYLOPRIM) 100 MG tablet, TAKE 1 TABLET BY MOUTH DAILY, Disp: 30 tablet, Rfl: 3  •  clobetasol (TEMOVATE) 0.05 % external solution, , Disp: , Rfl:   •  GLUCOSAMINE-CHONDROITIN PO, Take  by mouth., Disp: , Rfl:   •  ibuprofen (ADVIL,MOTRIN) 400 MG tablet, Take 1 tablet by mouth Every 6 (Six) Hours As Needed., Disp: , Rfl:   •  ketoconazole (NIZORAL) 2 % shampoo, , Disp: , Rfl:   •  Multiple Vitamins-Minerals (MULTIVITAMIN ADULT PO), Take 1 tablet by mouth Daily., Disp: , Rfl:   •  Probiotic Product  (PROBIOTIC DAILY PO), Take  by mouth., Disp: , Rfl:   •  triamcinolone (KENALOG) 0.1 % ointment, , Disp: , Rfl:   •  valACYclovir (Valtrex) 500 MG tablet, Take 1 tablet by mouth 2 (Two) Times a Day., Disp: 30 tablet, Rfl: 3    Past Medical History:   Diagnosis Date   • Allergic    • GERD (gastroesophageal reflux disease)    • Prostatitis        Past Surgical History:   Procedure Laterality Date   • COLONOSCOPY N/A 10/13/2020    Procedure: COLONOSCOPY INTO CECUM WITH POLYPECTOMY X 3, CLIP X 1;  Surgeon: Carlito Mcdaniels MD;  Location: [facility] ENDOSCOPY;  Service: General;  Laterality: N/A;  pre: screening  post: colon polyps x 3   • HAND SURGERY Right    • HIP SURGERY     • KNEE SURGERY Right        Social History     Occupational History   • Occupation: construction    Tobacco Use   • Smoking status: Never     Passive exposure: Never   • Smokeless tobacco: Never   • Tobacco comments:     daily caffine   Vaping Use   • Vaping Use: Never used   Substance and Sexual Activity   • Alcohol use: Yes     Comment: OCCASIONALLY    • Drug use: Never   • Sexual activity: Yes      Social History     Social History Narrative   • Not on file       Family History   Problem Relation Age of Onset   • Alzheimer's disease Father    • Colon polyps Sister    • Heart attack Paternal Uncle    • Prostate cancer Neg Hx    • Colon cancer Neg Hx    • Malig Hyperthermia Neg Hx        Review of Systems:      Constitutional: Denies fever, shaking or chills   Eyes: Denies change in visual acuity   HEENT: Denies nasal congestion or sore throat   Respiratory: Denies cough or shortness of breath   Cardiovascular: Denies chest pain or edema  Endocrine: Denies tremors, palpitations, intolerance of heat or cold, polyuria, polydipsia.  GI: Denies abdominal pain, nausea, vomiting, bloody stools or diarrhea  : Denies frequency, urgency, incontinence, retention, or nocturia.  Musculoskeletal: Denies numbness, tingling or loss of motor function  "except as above  Integument: Denies rash, lesion or ulceration   Neurologic: Denies headache or focal weakness, deficits  Heme: Denies spontaneous or excessive bleeding, epistaxis, hematuria, melena, fatigue, enlarged or tender lymph nodes.      All other pertinent positives and negatives as noted above in HPI.    Physical Exam:57 y.o. male  Vitals:    06/16/23 0828   Temp: 96.6 °F (35.9 °C)   TempSrc: Temporal   Weight: 83.9 kg (185 lb)   Height: 185.4 cm (72.99\")       General:  Patient is awake and alert.  Appears in no acute distress or discomfort.    Psych:  Affect and demeanor are appropriate.    Extremities:  Right shoulder is examined.  Skin is benign.  He has a mass at the volar aspect of his wrist.  It is just ulnar to the radial artery.  The mass is nontender and nonpulsatile.  The overlying skin is benign.  No focal areas of tenderness at the shoulder including the acromioclavicular joint.  Full shoulder motion.  No evident instability or apprehension.  Positive O'Briens maneuver which reproduces the his typical pain.  This seems to be deep anterior pain but I cannot tell if there is some degree of pain from the acromioclavicular joint as well.  He does have good strength in the rotator cuff, deltoid, biceps, triceps, and .  Intact sensation throughout the arm.  Brisk cap refill.  Palpable radial pulse.  Good skin turgor.     Imaging:  AP, scapular Y, and axillary views of the right shoulder are ordered by myself and reviewed to evaluate the patient's complaint.  No comparison films are immediately available.  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  There are no obvious acute abnormalities, lesions, masses, significant glenohumeral degenerative changes, or other concerning findings.  The acromiohumeral interval is normal.  Glenoid version appears normal as well.     Assessment/Plan:  1.  Right shoulder pain, suspected glenoid labral tear  2.  Right wrist ganglion cyst    I think his shoulder problem " is most likely a labral tear although I cannot entirely exclude that his acromioclavicular joint is contributing.  Certainly if it came down to a surgery for this issue I would probably plan to address both just depending on what an MRI showed.  We discussed the natural history of this condition and his options.  I suggested we start with a conservative approach.  I offered him a referral for PT but he declined.  He was interested and a shoulder injection.  As stated above, he also wanted to get the wrist aspirated and injection.  The risk, benefits and alternatives to both procedures were discussed.  He consented and the procedures were performed as described below.  I was able to aspirate roughly 8 cc of gelatinous fluid from the wrist while taking care to keep the radial artery protected.  I will wait to hear from him going forward.    Asher Bansal MD    06/16/2023    Large Joint Arthrocentesis: R subacromial bursa  Date/Time: 6/16/2023 9:42 AM  Consent given by: patient  Site marked: site marked  Timeout: Immediately prior to procedure a time out was called to verify the correct patient, procedure, equipment, support staff and site/side marked as required   Supporting Documentation  Indications: pain and joint swelling   Procedure Details  Location: shoulder - R subacromial bursa  Preparation: Patient was prepped and draped in the usual sterile fashion  Needle size: 25 G (21 G)  Approach: posterior  Medications administered: methylPREDNISolone acetate 80 MG/ML; lidocaine PF 1% 1 %  Patient tolerance: patient tolerated the procedure well with no immediate complications    Small Joint Arthrocentesis  Consent given by: patient  Site marked: site marked  Timeout: Immediately prior to procedure a time out was called to verify the correct patient, procedure, equipment, support staff and site/side marked as required   Supporting Documentation  Indications: pain   Procedure Details  Location: Ganglion  Cyst.  Preparation: Patient was prepped and draped in the usual sterile fashion  Needle size: 27 G  Medications administered: methylPREDNISolone acetate 80 MG/ML; lidocaine PF 1% 1 %  Patient tolerance: patient tolerated the procedure well with no immediate complications

## 2023-08-17 RX ORDER — VALACYCLOVIR HYDROCHLORIDE 500 MG/1
TABLET, FILM COATED ORAL
Qty: 30 TABLET | Refills: 0 | Status: SHIPPED | OUTPATIENT
Start: 2023-08-17

## 2023-08-25 ENCOUNTER — CLINICAL SUPPORT (OUTPATIENT)
Dept: ORTHOPEDIC SURGERY | Facility: CLINIC | Age: 58
End: 2023-08-25
Payer: MEDICAID

## 2023-08-25 VITALS — HEIGHT: 73 IN | TEMPERATURE: 98.6 F | WEIGHT: 177.9 LBS | BODY MASS INDEX: 23.58 KG/M2

## 2023-08-25 DIAGNOSIS — G89.29 CHRONIC PAIN OF RIGHT KNEE: ICD-10-CM

## 2023-08-25 DIAGNOSIS — M25.561 CHRONIC PAIN OF RIGHT KNEE: ICD-10-CM

## 2023-08-25 PROCEDURE — 99213 OFFICE O/P EST LOW 20 MIN: CPT | Performed by: ORTHOPAEDIC SURGERY

## 2023-08-25 NOTE — PROGRESS NOTES
Chief Complaint:  Right knee pain    HPI:  Mr. Hartmann was scheduled to come in today for a Visco injection.  He is questioning whether he needs that shot.  He says that he really feels like more of his pain is in the back corner of his knee and he has been doing some research on the Internet.  He is convinced that he has distal hamstring problems.  This has been a longstanding issue dating back many years.  It is a very frustrating problem for him.    Exam: Exam was largely deferred at this time.  He says that the only place he is really feeling the pain is the back corner of his knee.  He points exactly to the posterior aspect of the fibular head.  No gross abnormalities in that area on inspection.  He has full knee motion and normal gait.    Imaging:  No new xrays    Assessment: Posterolateral right knee pain, possible distal hamstring tendinopathy versus tib-fib articulation pathology    Plan: I agree that a Visco injection is unlikely to help with his main complaint.  We discussed his options.  I recommend an MRI to better evaluate this issue.  I told him I will call him when I see the results    Asher Bansal MD  08/25/2023    Procedures

## 2023-09-06 ENCOUNTER — HOSPITAL ENCOUNTER (OUTPATIENT)
Dept: MRI IMAGING | Facility: HOSPITAL | Age: 58
Discharge: HOME | End: 2023-09-06
Admitting: ORTHOPAEDIC SURGERY
Payer: MEDICAID

## 2023-09-06 DIAGNOSIS — M25.561 CHRONIC PAIN OF RIGHT KNEE: ICD-10-CM

## 2023-09-06 DIAGNOSIS — G89.29 CHRONIC PAIN OF RIGHT KNEE: ICD-10-CM

## 2023-09-06 PROCEDURE — 73721 MRI JNT OF LWR EXTRE W/O DYE: CPT

## 2023-09-07 ENCOUNTER — TELEPHONE (OUTPATIENT)
Dept: ORTHOPEDIC SURGERY | Facility: CLINIC | Age: 58
End: 2023-09-07
Payer: MEDICAID

## 2023-09-07 NOTE — TELEPHONE ENCOUNTER
I spoke to Mr. Hartmann.  I provided him with the right knee MRI as reviewed by Dr. Bansal.  He has some irritation and inflammation of the patellar tendon.  Otherwise, the MRI looks good.  We discussed treatment options including physical therapy and PRP.  He verbalized understanding of all we discussed.  He will give these options consideration.  He will follow-up as needed.

## 2023-11-01 ENCOUNTER — OFFICE VISIT (OUTPATIENT)
Dept: INTERNAL MEDICINE | Age: 58
End: 2023-11-01
Payer: MEDICAID

## 2023-11-01 VITALS
HEIGHT: 73 IN | HEART RATE: 53 BPM | BODY MASS INDEX: 23.33 KG/M2 | OXYGEN SATURATION: 98 % | WEIGHT: 176 LBS | TEMPERATURE: 97.6 F | SYSTOLIC BLOOD PRESSURE: 140 MMHG | DIASTOLIC BLOOD PRESSURE: 80 MMHG

## 2023-11-01 DIAGNOSIS — L02.01 ABSCESS OF FACE: Primary | ICD-10-CM

## 2023-11-01 PROCEDURE — 99213 OFFICE O/P EST LOW 20 MIN: CPT

## 2023-11-01 NOTE — PROGRESS NOTES
"    I N T E R N A L  M E D I C I N E  Mae Carter, [place]    ENCOUNTER DATE:  11/01/2023    Serafin Caryor / 58 y.o. / male      CHIEF COMPLAINT / REASON FOR OFFICE VISIT     Cyst      ASSESSMENT & PLAN     Diagnoses and all orders for this visit:    1. Abscess of face (Primary)  -     Ambulatory Referral to Plastic Surgery         SUMMARY/DISCUSSION  Recommend that he start oral antibiotics and seek close follow up with plastic surgery for I&D.  He is aware of importance of seeking care at the ER for any acutely worsening symptoms, including increased redness, warmth, swelling, vision changes, fever, chills.  Reassess at [facility] care appointment on November 13, 2023.        Next Appointment with me: 11/13/2023    Return for Next scheduled follow up.      VITAL SIGNS     Visit Vitals  /80   Pulse 53   Temp 97.6 °F (36.4 °C)   Ht 185.4 cm (72.99\")   Wt 79.8 kg (176 lb)   SpO2 98%   BMI 23.23 kg/m²             Wt Readings from Last 3 Encounters:   11/01/23 79.8 kg (176 lb)   11/01/23 79.8 kg (176 lb)   08/25/23 80.7 kg (177 lb 14.4 oz)     Body mass index is 23.23 kg/m².        MEDICATIONS AT THE TIME OF OFFICE VISIT     Current Outpatient Medications on File Prior to Visit   Medication Sig Dispense Refill    allopurinol (ZYLOPRIM) 100 MG tablet TAKE 1 TABLET BY MOUTH DAILY 30 tablet 3    clobetasol (TEMOVATE) 0.05 % external solution       doxycycline (VIBRAMYCIN) 100 MG capsule Take 1 capsule by mouth 2 (Two) Times a Day for 7 days. 14 capsule 0    GLUCOSAMINE-CHONDROITIN PO Take  by mouth.      ibuprofen (ADVIL,MOTRIN) 400 MG tablet Take 1 tablet by mouth Every 6 (Six) Hours As Needed.      ketoconazole (NIZORAL) 2 % shampoo       Multiple Vitamins-Minerals (MULTIVITAMIN ADULT PO) Take 1 tablet by mouth Daily.      Probiotic Product (PROBIOTIC DAILY PO) Take  by mouth.      triamcinolone (KENALOG) 0.1 % ointment       valACYclovir (VALTREX) 500 MG tablet TAKE 1 TABLET BY MOUTH TWICE DAILY 30 tablet 0     No " current facility-administered medications on file prior to visit.        HISTORY OF PRESENT ILLNESS     Here today for 3 week history of nodular swelling located in middle of right eyebrow.  Approximately one week ago, he attempted to drain the area using a clean needle.  Denies any resulting drainage, but has noticed the area has increased in size, and become erythremic, mildly warm.  No fever, chills, myalgias.  Denies any vision changes, eye pain.  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  He reports a prior history of cysts that having required I&D.        Patient Care Team:  Mae Carter APRN as PCP - General (Family Medicine)    REVIEW OF SYSTEMS     Review of Systems   Constitutional:  Negative for chills, fever and unexpected weight change.   Eyes:  Negative for photophobia, pain, discharge, redness and visual disturbance.   Respiratory:  Negative for cough, chest tightness and shortness of breath.    Cardiovascular:  Negative for chest pain, palpitations and leg swelling.   Skin:  Positive for color change.   Neurological:  Negative for dizziness, weakness, light-headedness and headaches.   Psychiatric/Behavioral:  The patient is not nervous/anxious.           PHYSICAL EXAMINATION     Physical Exam  Vitals reviewed.   Constitutional:       General: He is not in acute distress.     Appearance: Normal appearance. He is not ill-appearing, toxic-appearing or diaphoretic.   HENT:      Head: Normocephalic and atraumatic.   Eyes:      Extraocular Movements: Extraocular movements intact.      Pupils: Pupils are equal, round, and reactive to light.   Cardiovascular:      Rate and Rhythm: Normal rate and regular rhythm.      Heart sounds: Normal heart sounds.   Pulmonary:      Effort: Pulmonary effort is normal.      Breath sounds: Normal breath sounds.   Skin:     Comments: Dime sized nodular swelling under right eyebrow, with mild warmth, tenderness, erythema.  No drainage/  discharge.     Neurological:      Mental Status: He is alert and oriented to person, place, and time. Mental status is at baseline.   Psychiatric:         Mood and Affect: Mood normal.         Behavior: Behavior normal.         Thought Content: Thought content normal.         Judgment: Judgment normal.           REVIEWED DATA     Labs:           Imaging:            Medical Tests:           Summary of old records / correspondence / consultant report:           Request outside records:

## 2023-11-02 ENCOUNTER — TELEPHONE (OUTPATIENT)
Dept: INTERNAL MEDICINE | Age: 58
End: 2023-11-02
Payer: MEDICAID

## 2023-11-02 DIAGNOSIS — L02.01 ABSCESS OF FACE: Primary | ICD-10-CM

## 2023-11-02 NOTE — TELEPHONE ENCOUNTER
Hub staff attempted to follow warm transfer process and was unsuccessful     Caller: Serafin Hartmann    Relationship to patient: Self    Best call back number: [phone]     Patient is needing: WANTED TO KNOW PROVIDERS RESPONSE ABOVE.    CALL WAS DROPPED.    LEFT MESSAGE TO PLEASE CALL BACK

## 2023-11-02 NOTE — TELEPHONE ENCOUNTER
Caller: Serafin Hartmann    Relationship: Self    Best call back number: [phone]     Who are you requesting to speak with (clinical staff, provider,  specific staff member): SANKET ELLIS    What was the call regarding: PATIENT STATES HE CAN NOT GET INTO HIS REFERRAL FOR HIS EYE FOR OVER A MONTH. PATIENT WOULD LIKE TO GET THIS TAKEN CARE OF TODAY-PLEASE ADVISE WHAT HE SHOULD DO.

## 2023-11-03 ENCOUNTER — HOSPITAL ENCOUNTER (EMERGENCY)
Facility: HOSPITAL | Age: 58
Discharge: HOME | End: 2023-11-03
Attending: EMERGENCY MEDICINE
Payer: MEDICAID

## 2023-11-03 VITALS
RESPIRATION RATE: 16 BRPM | WEIGHT: 175 LBS | SYSTOLIC BLOOD PRESSURE: 159 MMHG | HEIGHT: 72 IN | DIASTOLIC BLOOD PRESSURE: 89 MMHG | HEART RATE: 54 BPM | TEMPERATURE: 97.2 F | BODY MASS INDEX: 23.7 KG/M2 | OXYGEN SATURATION: 99 %

## 2023-11-03 DIAGNOSIS — L02.01 FACIAL ABSCESS: Primary | ICD-10-CM

## 2023-11-03 PROCEDURE — 99282 EMERGENCY DEPT VISIT SF MDM: CPT

## 2023-11-03 RX ORDER — LIDOCAINE HYDROCHLORIDE AND EPINEPHRINE 10; 10 MG/ML; UG/ML
10 INJECTION, SOLUTION INFILTRATION; PERINEURAL ONCE
Status: COMPLETED | OUTPATIENT
Start: 2023-11-03 | End: 2023-11-03

## 2023-11-03 RX ADMIN — LIDOCAINE HYDROCHLORIDE,EPINEPHRINE BITARTRATE 10 ML: 10; .01 INJECTION, SOLUTION INFILTRATION; PERINEURAL at 10:19

## 2023-11-03 NOTE — ED PROVIDER NOTES
MD ATTESTATION NOTE    The SOFY and I have discussed this patient's history, physical exam, and treatment plan.  I have reviewed the documentation and personally had a face to face interaction with the patient. I affirm the documentation and agree with the treatment and plan.  The attached note describes my personal findings.    I provided a substantive portion of the care of this patient. I personally performed the physical exam, in its entirety.    Independent Historians: Patient    A complete HPI/ROS/PMH/PSH/SH/FH are unobtainable due to: None    Chronic or social conditions impacting patient care (social determinants of health): None    Serafin Hartmann is a 58 y.o. male who presents to the ED c/o acute right upper eyelid/eyebrow swelling and redness gradually getting worse over the last 2 weeks or so.  Patient denies any conjunctivitis, vision changes, fever.  Patient was started on doxycycline 2 days ago and referred to dermatology for follow-up.  However, the patient's appoint with dermatology is in 3 weeks and patient noted the swelling to be getting worse.            On exam:  GENERAL: Pleasant cooperative and conversant male, alert, no acute distress  SKIN: Warm, dry, right upper eyelid/eyebrow edema and erythema with focal area of marble sized fluctuance and pointing with no spontaneous drainage  HENT: Normocephalic, atraumatic  EYES: no scleral icterus, EOMI, no conjunctivitis, grossly normal vision  RESPIRATORY: Relaxed breathing  NEURO: alert, moves all extremities, follows commands                                                                 Medical Decision Making:  ED Course as of 11/04/23 0903   Fri Nov 03, 2023   0855 Patient presents with several week history of progressively worsening swelling, redness over the right.  Patient has an apparent abscess with fluctuance.  Plan for I&D.  No obvious ocular involvement. [EE]   1006 Abscess has been incised and drained.  Wound culture obtained.  No  evidence of septal cellulitis.  He is not immunocompromised.  Plan to have him continue his doxycycline.  We will refer him to oculoplastics. [EE]      ED Course User Index  [EE] Angel Javed PA       MDM: Patient presents with a focal area of abscess/furuncle to right eyebrow/right upper eyelid.  He has no signs of deeper infection to suggest orbital cellulitis.  Plan superficial I&D with plan for follow-up with dermatology as scheduled.  Patient is amenable to plan.    Procedures:  Procedures        PPE: I followed hospital protocols for proper PPE based on patient presentation including use of N95 mask for suspected infectious respiratory conditions.  Proper hand hygiene was performed both before and after the patient encounter.          Diagnosis  Final diagnoses:   Facial abscess       Note Disclaimer: At [facility], we believe that sharing information builds trust and better relationships. You are receiving this note because you recently visited [facility]. It is possible you will see health information before a provider has talked with you about it. This kind of information can be easy to misunderstand. To help you fully understand what it means for your health, we urge you to discuss this note with your provider.       Gilma Jones MD  11/04/23 0903

## 2023-11-03 NOTE — DISCHARGE INSTRUCTIONS
Warm compresses over your right eye.  Continue doxycycline at home.    Return to ER for any worsening symptoms

## 2023-11-03 NOTE — ED PROVIDER NOTES
EMERGENCY DEPARTMENT ENCOUNTER    Room Number:  03/03  Date of encounter:  11/3/2023  PCP: Mae Carter APRN  Historian: Patient  Chronic or social conditions impacting care (social determinants of health): Nothing    HPI:  Chief Complaint: Swelling over right eye  A complete HPI/ROS/PMH/PSH/SH/FH are unobtainable due to: Nothing    Context: Serafin Hartmann is a 58 y.o. male who presents to the ED c/o approximate 3-week history of erythematous nodule over the right eye, just underneath the eyebrow.  Patient states the area is progressively becoming more swollen, red.  He denies any overt fevers or chills.  He denies any blurry vision, eye redness, exudative discharge.  He did see a primary care physician 2 days ago and started on doxycycline.  He was referred to dermatology with an appointment in 3 weeks.    Review of prior external notes (non-ED):   I reviewed internal medicine office visit from 11/1/2023.  Patient seen for abscess and started on doxycycline.    Review of prior external test results outside of this encounter:  I reviewed a CMP from 10/12/2022.  Glucose 103, creatinine 0.9.    PAST MEDICAL HISTORY  Active Ambulatory Problems     Diagnosis Date Noted    Primary osteoarthritis of left hip 02/24/2017    Status post left hip replacement 03/08/2017    Routine adult health maintenance 09/23/2020    Colon polyps 10/13/2020    Psoriasis of scalp 10/12/2022    Ganglion of right wrist 10/12/2022    Idiopathic chronic gout of multiple sites with tophus 10/12/2022    Atypical mole 10/12/2022    Primary osteoarthritis involving multiple joints 10/12/2022     Resolved Ambulatory Problems     Diagnosis Date Noted    No Resolved Ambulatory Problems     Past Medical History:   Diagnosis Date    Allergic     GERD (gastroesophageal reflux disease)     Prostatitis          PAST SURGICAL HISTORY  Past Surgical History:   Procedure Laterality Date    COLONOSCOPY N/A 10/13/2020    Procedure: COLONOSCOPY INTO CECUM WITH  POLYPECTOMY X 3, CLIP X 1;  Surgeon: Carlito Mcdaniels MD;  Location: [facility] ENDOSCOPY;  Service: General;  Laterality: N/A;  pre: screening  post: colon polyps x 3    HAND SURGERY Right     HIP SURGERY      KNEE SURGERY Right          FAMILY HISTORY  Family History   Problem Relation Age of Onset    Alzheimer's disease Father     Colon polyps Sister     Heart attack Paternal Uncle     Prostate cancer Neg Hx     Colon cancer Neg Hx     Malig Hyperthermia Neg Hx          SOCIAL HISTORY  Social History     Socioeconomic History    Marital status: Single    Number of children: 1   Tobacco Use    Smoking status: Never     Passive exposure: Never    Smokeless tobacco: Never    Tobacco comments:     daily caffine   Vaping Use    Vaping Use: Never used   Substance and Sexual Activity    Alcohol use: Yes     Comment: OCCASIONALLY     Drug use: Never    Sexual activity: Yes         ALLERGIES  Oxycodone-acetaminophen        REVIEW OF SYSTEMS  All systems reviewed and negative except for those discussed in HPI.       PHYSICAL EXAM    I have reviewed the triage vital signs and nursing notes.    ED Triage Vitals   Temp Heart Rate Resp BP SpO2   11/03/23 0835 11/03/23 0835 11/03/23 0835 11/03/23 0837 11/03/23 0835   97.2 °F (36.2 °C) 60 16 159/89 99 %      Temp src Heart Rate Source Patient Position BP Location FiO2 (%)   -- -- -- -- --              Physical Exam  GENERAL: Alert, oriented, not distressed  HENT: Abscess just inferior to the right eyebrow.  Area is fluctuant and erythematous.  EYES: Right eye appears normal.  No injection or discharge.  CV: regular rhythm, regular rate, no murmur  RESPIRATORY: normal effort, CTA  ABDOMEN: soft, nontender  MUSCULOSKELETAL: no deformity, FROM, no calf swelling or tenderness  NEURO: alert, moves all extremities, follows commands  SKIN: warm, dry    Incision & Drainage    Date/Time: 11/3/2023 2:31 PM    Performed by: Angel Javed PA  Authorized by: Gilma Jones MD     Consent:     Consent obtained:  Verbal    Consent given by:  Patient  Universal protocol:     Patient identity confirmed:  Verbally with patient  Location:     Type:  Abscess    Location:  Head    Head location:  Face  Pre-procedure details:     Skin preparation:  Chlorhexidine  Anesthesia:     Anesthesia method:  Local infiltration    Local anesthetic:  Lidocaine 1% WITH epi  Procedure type:     Complexity:  Complex  Procedure details:     Incision types:  Single straight    Incision depth:  Subcutaneous    Wound management:  Probed and deloculated and irrigated with saline    Drainage:  Purulent    Drainage amount:  Moderate    Wound treatment:  Wound left open    Packing materials:  None  Post-procedure details:     Procedure completion:  Tolerated well, no immediate complications          MEDICATIONS GIVEN IN ER    Medications   lidocaine 1% - EPINEPHrine [id number] (XYLOCAINE W/EPI) 1 %-1:[id number] injection 10 mL (10 mL Injection Given 11/3/23 1019)         ADDITIONAL ORDERS CONSIDERED BUT NOT ORDERED:  Nothing      PROGRESS, DATA ANALYSIS, CONSULTS, AND MEDICAL DECISION MAKING    All labs have been independently interpreted by myself.  All radiology studies have been independently interpreted by myself and discussed with radiologist dictating the report.   EKG's independently interpreted by myself.  Discussion below represents my analysis of pertinent findings related to patient's condition, differential diagnosis, treatment plan and final disposition.    I have discussed case with Dr. Jones, emergency room physician.  She has performed her own bedside examination and agrees with treatment plan.    ED Course as of 11/03/23 1431   Fri Nov 03, 2023   0855 Patient presents with several week history of progressively worsening swelling, redness over the right.  Patient has an apparent abscess with fluctuance.  Plan for I&D.  No obvious ocular involvement. [EE]   1006 Abscess has been incised and drained.  Wound  culture obtained.  No evidence of septal cellulitis.  He is not immunocompromised.  Plan to have him continue his doxycycline.  We will refer him to oculoplastics. [EE]      ED Course User Index  [EE] Angel Javed PA       AS OF 14:31 EDT VITALS:    BP - 159/89  HR - 54  TEMP - 97.2 °F (36.2 °C)  O2 SATS - 99%        DIAGNOSIS  Final diagnoses:   Facial abscess         DISPOSITION  Discharged      Dictated utilizing Dragon dictation     Angel Javed PA  11/03/23 2143

## 2023-11-13 ENCOUNTER — HOSPITAL ENCOUNTER (OUTPATIENT)
Facility: HOSPITAL | Age: 58
Discharge: HOME | End: 2023-11-13
Payer: MEDICAID

## 2023-11-13 ENCOUNTER — OFFICE VISIT (OUTPATIENT)
Dept: INTERNAL MEDICINE | Age: 58
End: 2023-11-13
Payer: MEDICAID

## 2023-11-13 VITALS
HEIGHT: 72 IN | SYSTOLIC BLOOD PRESSURE: 124 MMHG | HEART RATE: 47 BPM | WEIGHT: 177 LBS | DIASTOLIC BLOOD PRESSURE: 80 MMHG | BODY MASS INDEX: 23.98 KG/M2 | TEMPERATURE: 97.6 F | OXYGEN SATURATION: 99 %

## 2023-11-13 DIAGNOSIS — Z86.19 HISTORY OF HERPES GENITALIS: ICD-10-CM

## 2023-11-13 DIAGNOSIS — R53.83 OTHER FATIGUE: ICD-10-CM

## 2023-11-13 DIAGNOSIS — Z87.39 HISTORY OF GOUT: ICD-10-CM

## 2023-11-13 DIAGNOSIS — Z76.89 ENCOUNTER TO ESTABLISH CARE: Primary | ICD-10-CM

## 2023-11-13 DIAGNOSIS — E78.00 PURE HYPERCHOLESTEROLEMIA: ICD-10-CM

## 2023-11-13 DIAGNOSIS — M54.50 RIGHT LUMBAR PAIN: ICD-10-CM

## 2023-11-13 DIAGNOSIS — L02.01 ABSCESS OF FACE: ICD-10-CM

## 2023-11-13 PROCEDURE — 72110 X-RAY EXAM L-2 SPINE 4/>VWS: CPT

## 2023-11-13 RX ORDER — VALACYCLOVIR HYDROCHLORIDE 500 MG/1
500 TABLET, FILM COATED ORAL 2 TIMES DAILY
Qty: 30 TABLET | Refills: 0 | Status: SHIPPED | OUTPATIENT
Start: 2023-11-13

## 2023-11-13 NOTE — PROGRESS NOTES
"    I N T E R N A L  M E D I C I N E  Mae Carter, [facility]ALEIGH    ENCOUNTER DATE:  11/13/2023    Serafin Mary Kate / 58 y.o. / male      CHIEF COMPLAINT / REASON FOR OFFICE VISIT     Establish Care      ASSESSMENT & PLAN     Diagnoses and all orders for this visit:    1. Encounter to establish care (Primary)    2. Right lumbar pain  -     XR Spine Lumbar 4+ View  -     Ambulatory Referral to Orthopedic Surgery    3. History of gout  -     CBC & Differential  -     Comprehensive Metabolic Panel  -     Uric Acid    4. Other fatigue  -     Testosterone, Free, Total    5. Pure hypercholesterolemia  -     Lipid Panel With / Chol / HDL Ratio    6. History of herpes genitalis  -     valACYclovir (VALTREX) 500 MG tablet; Take 1 tablet by mouth 2 (Two) Times a Day.  Dispense: 30 tablet; Refill: 0    7. Abscess of face         SUMMARY/DISCUSSION  For right lumbar pain symptoms, he declines PT referral and medications options, including lidocaine pain patches/ muscle relaxer.  Agreeable to investigate further with XR and referral to ortho. Aware to visit ER for any neurological symptoms.  Requesting testosterone level lab at today's appointment; he does have some minor fatigue symptoms.  Continue to be followed by dermatology.  Area of I&D of facial abscess appears to be healing well.  He will complete doxycycline prescription as prescribed.      Next Appointment with me: Visit date not found    Return in about 6 months (around 5/13/2024) for Annual physical.      VITAL SIGNS     Visit Vitals  /80   Pulse (!) 47   Temp 97.6 °F (36.4 °C)   Ht 182.9 cm (72.01\")   Wt 80.3 kg (177 lb)   SpO2 99%   BMI 24.00 kg/m²             Wt Readings from Last 3 Encounters:   11/13/23 80.3 kg (177 lb)   11/03/23 79.4 kg (175 lb)   11/01/23 79.8 kg (176 lb)     Body mass index is 24 kg/m².        MEDICATIONS AT THE TIME OF OFFICE VISIT     Current Outpatient Medications on File Prior to Visit   Medication Sig Dispense Refill    allopurinol " "(ZYLOPRIM) 100 MG tablet TAKE 1 TABLET BY MOUTH DAILY 30 tablet 3    clobetasol (TEMOVATE) 0.05 % external solution       GLUCOSAMINE-CHONDROITIN PO Take  by mouth.      ibuprofen (ADVIL,MOTRIN) 400 MG tablet Take 1 tablet by mouth Every 6 (Six) Hours As Needed.      ketoconazole (NIZORAL) 2 % shampoo       Multiple Vitamins-Minerals (MULTIVITAMIN ADULT PO) Take 1 tablet by mouth Daily.      Probiotic Product (PROBIOTIC DAILY PO) Take  by mouth.      triamcinolone (KENALOG) 0.1 % ointment       [DISCONTINUED] valACYclovir (VALTREX) 500 MG tablet TAKE 1 TABLET BY MOUTH TWICE DAILY 30 tablet 0     No current facility-administered medications on file prior to visit.        HISTORY OF PRESENT ILLNESS     Here to establish care.  Former patient of [facility]ALE[facility] Malin.    Seen in the ER on 11/03 and underwent I&D of erythematous nodule over right eye.  He is completing an extended 7 day course of doxycyline prescription.  Healing well.     Gout: Symptoms well controlled on allopurinol 100 mg daily x 6 months.  Prior gout symptoms occurred in toe and ankle.  October 2022 Uric acid 6.7.    Psoriasis/ basal cell history: Followed by dermatology, Khushboo Mondragon MD.  Using clobetasol external solution without benefit for scalp, triamcinolone ointment, ketoconazole ointment PRN.      HLD: October 2022 Lipid panel with elevated ; normal triglycerides 117.    Valacyclovir 500 mg BID PRN for rare occurrence of genital herpes fairs.      Colonoscopy completed October 13, 2020 with Dr. Mcdaniels.  Next due in 5 years.      Last PSA in September 2020 was 4.630.  Followed by [facility] for yearly PSA monitoring.      Saw Dr. Nimisha Khan, in July 2023 for right knee pain.   Patient expresses that ortho thought his right knee symptoms were likely related to possible sciatica.  He is requesting referral to a spine specialist.  He experiences occasional right lumbar pain.  He reports his back will \"lock up\" at times, resulting in " inability to physical move for up to 3 days at one time due to pain.  Denies fever, chills, numbness, tingling, weakness, bowel/ bladder changes, saddle anesthesia.  September 2023 Right Knee MRI showed mild tendinopathy at medial aspect of proximal patellar tendon without tear.          Patient Care Team:  Mae Carter APRN as PCP - General (Family Medicine)    REVIEW OF SYSTEMS     Review of Systems   Constitutional:  Negative for chills, fever and unexpected weight change.   Respiratory:  Negative for cough, chest tightness and shortness of breath.    Cardiovascular:  Negative for chest pain, palpitations and leg swelling.   Musculoskeletal:  Positive for back pain.   Neurological:  Negative for dizziness, weakness, light-headedness and headaches.   Psychiatric/Behavioral:  The patient is not nervous/anxious.           PHYSICAL EXAMINATION     Physical Exam  Vitals reviewed.   Constitutional:       General: He is not in acute distress.     Appearance: Normal appearance. He is not ill-appearing, toxic-appearing or diaphoretic.   HENT:      Head: Normocephalic and atraumatic.   Cardiovascular:      Rate and Rhythm: Normal rate and regular rhythm.      Heart sounds: Normal heart sounds.   Pulmonary:      Effort: Pulmonary effort is normal.      Breath sounds: Normal breath sounds.   Neurological:      Mental Status: He is alert and oriented to person, place, and time. Mental status is at baseline.      Sensory: No sensory deficit.      Motor: No weakness.   Psychiatric:         Mood and Affect: Mood normal.         Behavior: Behavior normal.         Thought Content: Thought content normal.         Judgment: Judgment normal.           REVIEWED DATA     Labs:           Imaging:            Medical Tests:           Summary of old records / correspondence / consultant report:           Request outside records:

## 2023-11-14 ENCOUNTER — PATIENT ROUNDING (BHMG ONLY) (OUTPATIENT)
Dept: INTERNAL MEDICINE | Age: 58
End: 2023-11-14
Payer: MEDICAID

## 2023-11-14 NOTE — PROGRESS NOTES
A [employer] message has been sent to the patient for PATIENT ROUNDING with [facility].

## 2023-11-16 LAB
ALBUMIN SERPL-MCNC: 4.4 G/DL (ref 3.5–5.2)
ALBUMIN/GLOB SERPL: 2.3 G/DL
ALP SERPL-CCNC: 60 U/L (ref 39–117)
ALT SERPL-CCNC: 22 U/L (ref 1–41)
AST SERPL-CCNC: 26 U/L (ref 1–40)
BASOPHILS # BLD AUTO: 0.04 10*3/MM3 (ref 0–0.2)
BASOPHILS NFR BLD AUTO: 0.7 % (ref 0–1.5)
BILIRUB SERPL-MCNC: 0.4 MG/DL (ref 0–1.2)
BUN SERPL-MCNC: 13 MG/DL (ref 8–23)
BUN/CREAT SERPL: 16.3 (ref 7–25)
CALCIUM SERPL-MCNC: 9.3 MG/DL (ref 8.6–10.5)
CHLORIDE SERPL-SCNC: 108 MMOL/L (ref 98–107)
CHOLEST SERPL-MCNC: 165 MG/DL (ref 0–200)
CHOLEST/HDLC SERPL: 2.95 {RATIO}
CO2 SERPL-SCNC: 27.4 MMOL/L (ref 22–29)
CREAT SERPL-MCNC: 0.8 MG/DL (ref 0.76–1.27)
EGFRCR SERPLBLD CKD-EPI 2021: 101.3 ML/MIN/1.73
EOSINOPHIL # BLD AUTO: 0.21 10*3/MM3 (ref 0–0.4)
EOSINOPHIL NFR BLD AUTO: 3.8 % (ref 0.3–6.2)
ERYTHROCYTE [DISTWIDTH] IN BLOOD BY AUTOMATED COUNT: 12.3 % (ref 12.3–15.4)
GLOBULIN SER CALC-MCNC: 1.9 GM/DL
GLUCOSE SERPL-MCNC: 100 MG/DL (ref 65–99)
HCT VFR BLD AUTO: 43.3 % (ref 37.5–51)
HDLC SERPL-MCNC: 56 MG/DL (ref 40–60)
HGB BLD-MCNC: 14.6 G/DL (ref 13–17.7)
IMM GRANULOCYTES # BLD AUTO: 0.01 10*3/MM3 (ref 0–0.05)
IMM GRANULOCYTES NFR BLD AUTO: 0.2 % (ref 0–0.5)
LDLC SERPL CALC-MCNC: 96 MG/DL (ref 0–100)
LYMPHOCYTES # BLD AUTO: 1.28 10*3/MM3 (ref 0.7–3.1)
LYMPHOCYTES NFR BLD AUTO: 23.3 % (ref 19.6–45.3)
MCH RBC QN AUTO: 30.2 PG (ref 26.6–33)
MCHC RBC AUTO-ENTMCNC: 33.7 G/DL (ref 31.5–35.7)
MCV RBC AUTO: 89.6 FL (ref 79–97)
MONOCYTES # BLD AUTO: 0.34 10*3/MM3 (ref 0.1–0.9)
MONOCYTES NFR BLD AUTO: 6.2 % (ref 5–12)
NEUTROPHILS # BLD AUTO: 3.62 10*3/MM3 (ref 1.7–7)
NEUTROPHILS NFR BLD AUTO: 65.8 % (ref 42.7–76)
NRBC BLD AUTO-RTO: 0 /100 WBC (ref 0–0.2)
PLATELET # BLD AUTO: 209 10*3/MM3 (ref 140–450)
POTASSIUM SERPL-SCNC: 4.3 MMOL/L (ref 3.5–5.2)
PROT SERPL-MCNC: 6.3 G/DL (ref 6–8.5)
RBC # BLD AUTO: 4.83 10*6/MM3 (ref 4.14–5.8)
SODIUM SERPL-SCNC: 142 MMOL/L (ref 136–145)
TESTOST FREE SERPL-MCNC: 10 PG/ML (ref 6.6–18.1)
TESTOST SERPL-MCNC: 416 NG/DL (ref 264–916)
TRIGL SERPL-MCNC: 67 MG/DL (ref 0–150)
URATE SERPL-MCNC: 4.7 MG/DL (ref 3.4–7)
VLDLC SERPL CALC-MCNC: 13 MG/DL (ref 5–40)
WBC # BLD AUTO: 5.5 10*3/MM3 (ref 3.4–10.8)

## 2023-12-22 DIAGNOSIS — Z86.19 HISTORY OF HERPES GENITALIS: ICD-10-CM

## 2023-12-22 RX ORDER — VALACYCLOVIR HYDROCHLORIDE 500 MG/1
500 TABLET, FILM COATED ORAL 2 TIMES DAILY
Qty: 30 TABLET | Refills: 0 | Status: SHIPPED | OUTPATIENT
Start: 2023-12-22

## 2023-12-22 NOTE — TELEPHONE ENCOUNTER
Caller: Serafin Hartmann    Relationship: Self    Best call back number: [phone]     Requested Prescriptions:   Requested Prescriptions     Pending Prescriptions Disp Refills    valACYclovir (VALTREX) 500 MG tablet 30 tablet 0     Sig: Take 1 tablet by mouth 2 (Two) Times a Day.        Pharmacy where request should be sent: [id number] [address] AT [facility] [address]  [place]  [phone] [facility] [phone]      Last office visit with prescribing clinician: 11/13/2023   Last telemedicine visit with prescribing clinician: Visit date not found   Next office visit with prescribing clinician: 5/14/2024     Additional details provided by patient:     Does the patient have less than a 3 day supply:  [x] Yes  [] No    Would you like a call back once the refill request has been completed: [x] Yes [] No    If the office needs to give you a call back, can they leave a voicemail: [] Yes [] No    Vivian Hutchinson Rep   12/22/23 12:01 EST

## 2024-01-02 ENCOUNTER — OFFICE VISIT (OUTPATIENT)
Dept: ORTHOPEDIC SURGERY | Facility: CLINIC | Age: 59
End: 2024-01-02
Payer: MEDICAID

## 2024-01-02 VITALS — HEIGHT: 73 IN | BODY MASS INDEX: 23.72 KG/M2 | TEMPERATURE: 98.4 F | WEIGHT: 179 LBS

## 2024-01-02 DIAGNOSIS — M47.819 FACET ARTHROPATHY: ICD-10-CM

## 2024-01-02 DIAGNOSIS — M54.16 LUMBAR RADICULOPATHY: Primary | ICD-10-CM

## 2024-01-02 PROCEDURE — 1159F MED LIST DOCD IN RCRD: CPT | Performed by: NURSE PRACTITIONER

## 2024-01-02 PROCEDURE — 1160F RVW MEDS BY RX/DR IN RCRD: CPT | Performed by: NURSE PRACTITIONER

## 2024-01-02 PROCEDURE — 99214 OFFICE O/P EST MOD 30 MIN: CPT | Performed by: NURSE PRACTITIONER

## 2024-01-02 NOTE — PROGRESS NOTES
"Patient Name: Serafin Hartmann   YOB: 1965  Referring Primary Care Physician: Mae Carter APRN      Chief Complaint:    Chief Complaint   Patient presents with    Lumbar Spine - Pain        Back Pain  This is a new problem. The current episode started more than 1 year ago. The problem occurs daily. The problem has been worsening since onset. The pain is present in the lumbar spine. The quality of the pain is described as aching and shooting. The pain does not radiate. The pain is at a severity of 10/10. The pain is severe. The pain is The same all the time. The symptoms are aggravated by bending, lying down, twisting and sitting. Stiffness is present All day. He has tried NSAIDs for the symptoms. The treatment provided no relief.   Additional comments: Pt takes OTC pain med but doesn't help       HPI:  Serafin Hartmann is a 58 y.o. male who presents to [facility] for evaluation of above complaints.  Complains primarily of pain in the right lateral knee, but also a \"locking\" sensation of the lower back especially with any forward flexion.  He says his back will go out 2 or 3 times a year, the last episode lasted much longer than usual which obviously had of concerned.  He also reports a history of wakeboarding and difficulty getting the board off his right leg and some friends pulled it off fairly aggressively and he had some right leg numbness for about 6 months afterwards.  He has had persistent numbness primarily in the right buttock since that time.  Pain can get quite intense.  He is very active and cycles regularly.  He is still able to do so but obviously aggravated by the back and right knee pain.  He reports history of remote right knee surgery and has recently been evaluated by Dr. Bansal and had MRI of the right knee in September.  No bowel or bladder dysfunction or saddle anesthesia.  This is an established patient of practice, new to me.  Prior pertinent " records were reviewed.    PFSH:  See attached    ROS: As per HPI, otherwise negative    Objective:      58 y.o. male  Body mass index is 23.62 kg/m²., 81.2 kg (179 lb), @HT@  Vitals:    01/02/24 0912   Temp: 98.4 °F (36.9 °C)     Pain Score    01/02/24 0912   PainSc: 10-Worst pain ever   PainLoc: Back            Spine Musculoskeletal Exam    Gait    Gait is normal.    Palpation    Thoracolumbar    Tenderness: present    Strength    Thoracolumbar    Thoracolumbar motor exam is normal.       Sensory    Thoracolumbar    Thoracolumbar sensation is normal.    Reflexes    Right      Quadriceps: 2/4      Achilles: 0/4     Left      Quadriceps: 2/4      Achilles: 0/4    Special Tests    Thoracolumbar      Right      SLR: no back or leg pain      Left      SLR: no back or leg pain    General      Constitutional: well-developed and well-nourished    Scleral icterus: no    Labored breathing: no    Psychiatric: normal mood and affect and no acute distress    Neurological: alert and oriented x3    Skin: intact        IMAGING:     No imaging in office today.  Lumbar plain films 11/13/2023 reviewed with the patient reveals mild degenerative disc and facet arthropathy in the lower lumbar spine, disc base swelling most pronounced L1-L4 5 and L5-S1 but mild.  No fractures or subluxation.  Agree with report.    Assessment:           Diagnoses and all orders for this visit:    1. Lumbar radiculopathy (Primary)  -     MRI Lumbar Spine Without Contrast; Future    2. Facet arthropathy             Plan:  He has been doing a home stretching program now for over 6 months but symptoms are progressing.  He is very active with cycling and has been trying to do yoga again recently.  He previously did yoga regularly, but found his only it sometimes aggravated his symptoms.  He is working on core strength as well but finds that some of the exercises aggravating.  He is still able to cycle, but slowed down by the back and right knee and buttock  pain.  Will submit for lumbar MRI without contrast with an eye towards injection therapy.  He asks today about hydrogel injections which appear to have recently been FDA approved, but unaware that any pain management specialist in [facility] are offering this technique.  We discussed the more mainstream injection options of epidurals versus facet injections today.  Once MRI results are available, we will contact the patient and either bring him back in to discuss results and next options or refer him to pain management to try KARIN versus facet injections.  He understands and agrees with the plan.  Over 30 minutes discussing anatomy, exam, counseling, reviewing imaging and discussing plan.  Return for Call with results.    EMR Dragon/Transcription Disclaimer:   Much of this encounter note is an electronic transcription/translation of spoken language to printed text. The electronic translation of spoken language may permit erroneous, or at times, nonsensical words or phrases to be inadvertently transcribed; Although I have reviewed the note for such errors, some may still exist.  Red flags have been discussed at this or previous visits to include but not limited weakness in extremities, worsening pain that does not respond to conservative treatment and bowel or bladder dysfunction. These are reasons to present to ER and patient has been informed.    The diagnosis(es), natural history, pathophysiology and treatment for diagnosis(es) were discussed. Opportunity given and questions answered. Biomechanics of pertinent body areas discussed.    EXERCISES:  Advice on benefits of, and types of regular/moderate exercise pertaining to diagnosis.  Continue HEP. For back or neck pain, recommend pilates and or yoga as tolerated. Generally it is best to start any new exercise under the guidance of a  or therapist.   MEDICATIONS:  When prescribe, the risks, benefits, warnings,side effects and alternatives of medications discussed.  Advised against long term use of narcotics.   PAIN CONTROL:  Cold, heat, OTC lidocaine patches and/or ointment as needed. Avoid direct skin contact with ice. Ice 15-20 minutes 3-4 times daily as needed. For SI joint pain, recommend ice bath in water about 50 degrees for 5 consecutive days, add ice slowly to help with adjustment and may cover with warm towel or robe to help with cold tolerance. If using lidocaine, do not apply heat in conjunction as this can cause a burn.   MEDICAL RECORDS reviewed from other provider(s) for past and current medical history pertinent to this visit..

## 2024-01-11 DIAGNOSIS — M54.16 LUMBAR RADICULOPATHY: ICD-10-CM

## 2024-01-25 DIAGNOSIS — M54.16 LUMBAR RADICULOPATHY: Primary | ICD-10-CM

## 2024-02-02 ENCOUNTER — TELEPHONE (OUTPATIENT)
Dept: PAIN MEDICINE | Facility: CLINIC | Age: 59
End: 2024-02-02
Payer: MEDICAID

## 2024-02-05 ENCOUNTER — OFFICE VISIT (OUTPATIENT)
Dept: PAIN MEDICINE | Facility: CLINIC | Age: 59
End: 2024-02-05
Payer: MEDICAID

## 2024-02-05 VITALS
HEART RATE: 54 BPM | WEIGHT: 181.4 LBS | DIASTOLIC BLOOD PRESSURE: 87 MMHG | HEIGHT: 73 IN | RESPIRATION RATE: 18 BRPM | OXYGEN SATURATION: 98 % | SYSTOLIC BLOOD PRESSURE: 152 MMHG | TEMPERATURE: 97.1 F | BODY MASS INDEX: 24.04 KG/M2

## 2024-02-05 DIAGNOSIS — M51.37 DEGENERATION OF LUMBAR OR LUMBOSACRAL INTERVERTEBRAL DISC: Primary | ICD-10-CM

## 2024-02-05 DIAGNOSIS — M47.816 LUMBAR FACET ARTHROPATHY: ICD-10-CM

## 2024-02-05 PROCEDURE — 1160F RVW MEDS BY RX/DR IN RCRD: CPT | Performed by: PHYSICIAN ASSISTANT

## 2024-02-05 PROCEDURE — 99204 OFFICE O/P NEW MOD 45 MIN: CPT | Performed by: PHYSICIAN ASSISTANT

## 2024-02-05 PROCEDURE — 1159F MED LIST DOCD IN RCRD: CPT | Performed by: PHYSICIAN ASSISTANT

## 2024-02-05 PROCEDURE — 1125F AMNT PAIN NOTED PAIN PRSNT: CPT | Performed by: PHYSICIAN ASSISTANT

## 2024-02-05 NOTE — PROGRESS NOTES
"CHIEF COMPLAINT  right sided lower back pain     Subjective   Serafin Hartmann is a 58 y.o. male.   He presents to the office for initial evaluation of low back pain. He was referred here by HALEIGH Alva.  This patient reports a 20-year history of lumbosacral spine pain which she attributes to an injury sustained during a sporting event.  At that time he states that he had severe numbness affecting the entire right lower extremity which resolved over 6 months period of time.  Since then however he has had 20 years of pain primarily affecting the right side of the lumbosacral spine which she describes as a continuous aching sensation.  He no longer experiences any pain radiating into the lower extremity, denies numbness, tingling and weakness.  He has found that he is having increased episodes of his back \"locking up.\"  He also finds that his back pain wakes him up at night when he is trying to turn over in the bed as well as has increased pain with prolonged sitting in a chair.  Patient states that he is very physically active and does not find that walking or standing not increase his pain.    Patient denies any history of cervical or lumbar spine surgery.  He has not been in any type of interventional pain management nor is he interested in any type of narcotic pain medications.    The patient has not participated in physical nor chiropractic therapy however he states that he is very physically active and does stretching exercises on a daily basis.    Pain today 2/10 VAS in severity.    Back Pain  This is a chronic problem. The current episode started more than 1 year ago. The problem occurs constantly. The problem has been worse since onset. The pain is present in the lumbar spine. The quality of the pain is described as aching. The pain does not radiate. The pain is at a severity of 2/10. The pain is mild. The pain is Worse during the night. The symptoms are aggravated by sitting (turning over in the bed). " Pertinent negatives include no abdominal pain, chest pain, dysuria, fever, headaches, numbness or weakness. He has tried home exercises for the symptoms. The treatment provided mild relief.        PEG Assessment   What number best describes your pain on average in the past week?8  What number best describes how, during the past week, pain has interfered with your enjoyment of life?5  What number best describes how, during the past week, pain has interfered with your general activity?  5        Current Outpatient Medications:     allopurinol (ZYLOPRIM) 100 MG tablet, TAKE 1 TABLET BY MOUTH DAILY, Disp: 30 tablet, Rfl: 3    clobetasol (TEMOVATE) 0.05 % external solution, , Disp: , Rfl:     GLUCOSAMINE-CHONDROITIN PO, Take  by mouth., Disp: , Rfl:     ibuprofen (ADVIL,MOTRIN) 400 MG tablet, Take 1 tablet by mouth Every 6 (Six) Hours As Needed., Disp: , Rfl:     ketoconazole (NIZORAL) 2 % shampoo, , Disp: , Rfl:     Multiple Vitamins-Minerals (MULTIVITAMIN ADULT PO), Take 1 tablet by mouth Daily., Disp: , Rfl:     Probiotic Product (PROBIOTIC DAILY PO), Take  by mouth., Disp: , Rfl:     triamcinolone (KENALOG) 0.1 % ointment, , Disp: , Rfl:     valACYclovir (VALTREX) 500 MG tablet, Take 1 tablet by mouth 2 (Two) Times a Day., Disp: 30 tablet, Rfl: 0    The following portions of the patient's history were reviewed and updated as appropriate: allergies, current medications, past family history, past medical history, past social history, past surgical history, and problem list.      REVIEW OF PERTINENT MEDICAL DATA              Review of Systems   Constitutional:  Negative for activity change, fatigue and fever.   HENT:  Negative for congestion.    Respiratory:  Negative for cough and chest tightness.    Cardiovascular:  Negative for chest pain.   Gastrointestinal:  Negative for abdominal pain, constipation and diarrhea.   Genitourinary:  Negative for difficulty urinating and dysuria.   Musculoskeletal:  Positive for back  "pain.   Neurological:  Negative for dizziness, weakness, light-headedness, numbness and headaches.   Psychiatric/Behavioral:  Positive for sleep disturbance. Negative for agitation and suicidal ideas. The patient is not nervous/anxious.        I have reviewed and confirmed the accuracy of the ROS as documented by the MA/LPN/RN CHUYITA Gallego    Vitals:    02/05/24 0920   BP: 152/87   BP Location: Right arm   Patient Position: Sitting   Cuff Size: Large Adult   Pulse: 54   Resp: 18   Temp: 97.1 °F (36.2 °C)   TempSrc: Temporal   SpO2: 98%   Weight: 82.3 kg (181 lb 6.4 oz)   Height: 185.4 cm (73\")   PainSc:   2         Objective       Physical Exam  Vitals and nursing note reviewed.   Constitutional:       Appearance: Normal appearance. He is normal weight.   HENT:      Head: Normocephalic.   Pulmonary:      Effort: Pulmonary effort is normal.   Musculoskeletal:      Lumbar back: Tenderness (mild pain to palpation over the right lumbar paraspinal muscles/facet joint spaces) present. Decreased range of motion (pain increased with lumbar extension).        Back:    Skin:     General: Skin is warm and dry.   Neurological:      General: No focal deficit present.      Mental Status: He is alert and oriented to person, place, and time.      Cranial Nerves: Cranial nerves 2-12 are intact.      Sensory: Sensation is intact.      Motor: Motor function is intact.      Gait: Gait is intact.      Deep Tendon Reflexes:      Reflex Scores:       Patellar reflexes are 1+ on the right side and 1+ on the left side.       Achilles reflexes are 1+ on the right side and 1+ on the left side.  Psychiatric:         Mood and Affect: Mood normal.         Behavior: Behavior normal.         Thought Content: Thought content normal.         Judgment: Judgment normal.         Assessment & Plan   Diagnoses and all orders for this visit:    1. Degeneration of lumbar or lumbosacral intervertebral disc (Primary)  -     Ambulatory Referral to " Physical Therapy Evaluate and treat; Stretching, ROM, Strengthening    2. Lumbar facet arthropathy  -     Ambulatory Referral to Physical Therapy Evaluate and treat; Stretching, ROM, Strengthening        --- Serafin Hartmann reports a pain score of 2.  Given his pain assessment as noted, treatment options were discussed and the following options were decided upon as a follow-up plan to address the patient's pain: educational materials on pain management, referral to Physical Therapy, and use of non-medical modalities (ice, heat, stretching and/or behavior modifications).    --- I have discussed with the patient that I feel that he may benefit from a formalized course of physical therapy for further evaluation treatment and strengthening of the lumbar spine.  Referral has been made to [facility].  --- Based on his physical exam findings as well as MRI he may be a future candidate for right diagnostic lumbar medial branch blocks to proceed with radiofrequency ablation if favorable response.  I provided him with patient education materials for his perusal on today.  --- Follow-up in 6 weeks for further evaluation and treat recommendations to be made    Pain / Disability Scale    The scale used for measurement of pain and/or disability for this patient was the [place] back pain disability scale.  The score was 6 on 02/05/2024           SARAY REPORT    SARAY report has been reviewed and scanned into the patient's chart.    As the clinician, I personally reviewed the SARAY from 2/5/2024 while the patient was in the office today.        Dictated utilizing Dragon dictation.

## 2024-02-19 ENCOUNTER — HOSPITAL ENCOUNTER (OUTPATIENT)
Dept: PHYSICAL THERAPY | Facility: HOSPITAL | Age: 59
Discharge: HOME | End: 2024-02-19
Payer: MEDICAID

## 2024-02-19 DIAGNOSIS — R29.3 POSTURE IMBALANCE: ICD-10-CM

## 2024-02-19 DIAGNOSIS — M54.50 CHRONIC RIGHT-SIDED LOW BACK PAIN WITHOUT SCIATICA: Primary | ICD-10-CM

## 2024-02-19 DIAGNOSIS — G89.29 CHRONIC RIGHT-SIDED LOW BACK PAIN WITHOUT SCIATICA: Primary | ICD-10-CM

## 2024-02-19 DIAGNOSIS — R26.89 DECREASED SPINAL MOBILITY: ICD-10-CM

## 2024-02-19 DIAGNOSIS — R29.898 WEAKNESS OF BOTH LOWER EXTREMITIES: ICD-10-CM

## 2024-02-19 PROCEDURE — 97161 PT EVAL LOW COMPLEX 20 MIN: CPT

## 2024-02-19 PROCEDURE — 97110 THERAPEUTIC EXERCISES: CPT

## 2024-02-19 NOTE — THERAPY EVALUATION
"  Outpatient Physical Therapy Ortho Initial Evaluation  [facility]     Patient Name: Serafin Hartmann  : 1965  MRN: [id number]  Today's Date: 2024      Visit Date: 2024    Patient Active Problem List   Diagnosis    Primary osteoarthritis of left hip    Status post left hip replacement    Routine adult health maintenance    Colon polyps    Psoriasis of scalp    Ganglion of right wrist    Idiopathic chronic gout of multiple sites with tophus    Atypical mole    Primary osteoarthritis involving multiple joints        Past Medical History:   Diagnosis Date    Allergic     GERD (gastroesophageal reflux disease)     Prostatitis         Past Surgical History:   Procedure Laterality Date    BASAL CELL CARCINOMA EXCISION  2023    carcinoma basal cell removal off his nose    COLONOSCOPY N/A 10/13/2020    Procedure: COLONOSCOPY INTO CECUM WITH POLYPECTOMY X 3, CLIP X 1;  Surgeon: Carlito Mcdaniels MD;  Location: [facility] END[facility];  Service: General;  Laterality: N/A;  pre: screening  post: colon polyps x 3    CYST REMOVAL Right 2023    eyebrow    HAND SURGERY Right     HIP SURGERY      KNEE SURGERY Right        Visit Dx:     ICD-10-CM ICD-9-CM   1. Chronic right-sided low back pain without sciatica  M54.50 724.2    G89.29 338.29   2. Weakness of both lower extremities  R29.898 729.89   3. Posture imbalance  R29.3 729.90   4. Decreased spinal mobility  R26.89 V48.3          Patient History       Row Name 24 1000             History    Chief Complaint Pain  -MO      Date Current Problem(s) Began --  chronic  -MO      Brief Description of Current Complaint 57 y/o male pt reports to PT with c/o chronic right sided LBP for the last 30+ years. Pt reports hx includes wakeboarding accident in early 20s that caused initial \"catch\" in R side that resulted in numbness down his right leg to the knee, took ~6 months to resolve radicular symptoms however R sided back pain has remained. At current " "status, pain has been a constant dull ache without any full relief. It is aggravated first thing in the morning, during the night sleeping reporting he wakes every 3-4 hours secondary to pain however this has been the case for several years, as well as worst with sitting where he frequently has to change position or stand up. Additionally aggravated with extension and prolonged flexion. Pain at night is lessened with lower body rotation with gross legged rotation, denies any other alleviating factors. Biggest complaint current is that over the last year to year and a half, he has had several instances of his back locking up when he is bending down. Typically it will resolve within several hours however he has had 1 instance where it lasted several days and he was unable to move off the couch. Additionally of note, pt reports lateral R knee pain isolated to joint line, reporting it feels like there is a \"nail\" in there. Has had imaging with no acute findings. He has been very active doing extreme sports his whole life, cycled in college. Currently he stays extremely active with cycling and hiking. He reports he goes to the gym daily and always does a 15-20 minute warmup with stretching and the sauna.  -MO      Patient/Caregiver Goals Improve mobility;Relieve pain  -MO      Patient/Caregiver Goals Comment Fix the locking of the back  -MO         Pain     Pain Location Back  -MO      Pain at Present 2  -MO      Pain at Best 2  -MO      Pain Frequency Constant/continuous  -MO      Pain Description Aching  -MO      Is your sleep disturbed? Yes  sleeps 3-4 hours at a time  -MO         Fall Risk Assessment    Any falls in the past year: No  -MO         Services    Prior Rehab/Home Health Experiences No  -MO         Daily Activities    Primary Language English  -MO      Are you able to read Yes  -MO      Are you able to write Yes  -MO      How does patient learn best? Listening;Reading;Demonstration  -MO      Does patient " have problems with the following? None  -MO      Barriers to learning None  -MO      Pt Participated in POC and Goals Yes  -MO         Safety    Are you being hurt, hit, or frightened by anyone at home or in your life? No  -MO      Are you being neglected by a caregiver No  -MO      Have you had any of the following issues with N/A  -MO                User Key  (r) = Recorded By, (t) = Taken By, (c) = Cosigned By      Initials Name Provider Type    Beryl Aranda PT Physical Therapist                     PT Ortho       Row Name 02/19/24 1000       Posture/Observations    Alignment Options Thoracic kyphosis;Lumbar lordosis  -MO    Thoracic Kyphosis Moderate  More pronounced in supine  -MO    Lumbar lordosis Mild  -MO       Quarter Clearing    Quarter Clearing Lower Quarter Clearing  -MO       Myotomal Screen- Lower Quarter Clearing    Hip flexion (L2) Bilateral:;4- (Good -)  -MO    Knee extension (L3) Bilateral:;5 (Normal)  -MO    Ankle DF (L4) Bilateral:;5 (Normal)  -MO    Knee flexion (S2) Bilateral:;4 (Good)  -MO       Lumbar ROM Screen- Lower Quarter Clearing    Lumbar Flexion Impaired  to floor with BL knee bend, restricted flexion demo'd in lumbar spine  -MO    Lumbar Extension Normal  normal however pain at end range  -MO    Lumbar Lateral Flexion Normal  -MO    Lumbar Rotation Normal  -MO       Special Tests/Palpation    Special Tests/Palpation Lumbar/SI  -MO       Thoracic Accessory Motions    Thoracic Accessory Motions Tested? Yes  -MO    Pa glide- Middle thoracic Center:;Hypomobile  -MO    Pa glide- Lower thoracic Center:;Hypomobile  -MO       Lumbosacral Accessory Motions    Lumbosacral Accessory Motions Tested? Yes  -MO    PA Glide- L1 Hypomobile  -MO    PA [place]- L2 Hypermobile  -MO    PA [place]- L3 Hypermobile  -MO    PA [place]- L4 Hypermobile  -MO    PA Glide- L5 Hypomobile  -MO    Innominate outflare Hypermobile  -MO       Lumbosacral Palpation    Lumbosacral Palpation? Yes  -MO       Knee Palpation     Knee Palpation? Yes  -MO    Lateral Joint Line Right:;Tender  -MO    ITB Right:;Guarded/taut  -MO    Semitendinosis Bilateral:;Guarded/taut  -MO       MMT (Manual Muscle Testing)    Rt Lower Ext Rt Hip ABduction;Rt Hip Extension  -MO    Lt Lower Ext Lt Hip ABduction;Lt Hip Extension  -MO       MMT Right Lower Ext    Rt Hip Extension MMT, Gross Movement (4/5) good  with low back compensation  -MO    Rt Hip ABduction MMT, Gross Movement (4+/5) good plus  -MO       MMT Left Lower Ext    Lt Hip Extension MMT, Gross Movement (4/5) good  -MO    Lt Hip ABduction MMT, Gross Movement (4+/5) good plus  with low back compensation  -MO       Flexibility    Flexibility Tested? Lower Extremity  -MO       Lower Extremity Flexibility    Overall LE Flexibility Moderately limited  -MO    Hamstrings Bilateral:;Moderately limited;Severely limited  -MO    Hip Flexors Bilateral:;Moderately limited  -MO    Quadriceps Bilateral:;Moderately limited  -MO    ITB Bilateral:;Moderately limited  -MO              User Key  (r) = Recorded By, (t) = Taken By, (c) = Cosigned By      Initials Name Provider Type    Beryl Aranda, PT Physical Therapist                                Therapy Education  Education Details: Educated on PT role and POC. Provided HEP.  Given: HEP, Symptoms/condition management  Program: New  How Provided: Verbal, Demonstration, Written  Provided to: Patient  Level of Understanding: Teach back education performed, Verbalized, Demonstrated  47415 - PT Self Care/Mgmt Minutes: 5      PT OP Goals       Row Name 02/19/24 1400          PT Short Term Goals    STG Date to Achieve 03/20/24  -MO     STG 1 Patient will be independent with education for symptom management and initial HEP to decrease LBP pain.  -MO     STG 1 Progress New  -MO     STG 2 Patient will demonstrate proper log roll mechanics with little to no cues when getting on/off treatment table to demonstrate improved mechanics and decreased strain on lumbar spine.   -MO     STG 2 Progress New  -MO     STG 3 Pt will improve gross B hip flexion strength to at least 4+/5.  -MO     STG 3 Progress New  -MO     STG 4 Pt will report >/= 20% improvement in morning pain symptoms  -MO     STG 4 Progress New  -MO        Long Term Goals    LTG Date to Achieve 04/19/24  -MO     LTG 1 Patient will be independent with education for symptom management and advanced HEP to decrease LBP pain.  -MO     LTG 1 Progress New  -MO     LTG 2 Patient will reduce level of percieved disability as measured by the Modified Oswestry  from 36% disability to </= 25% disability in order to improve quality of life.  -MO     LTG 2 Progress New  -MO     LTG 3 Pt will improve gross LE strength to 5/5 bilaterally  -MO     LTG 3 Progress New  -MO     LTG 4 Pt will demonstrate appropriate body mechanics for bending/lifting tasks to improve tolerance to ADLs.  -MO     LTG 4 Progress New  -MO     LTG 5 Patient will improve ability to sleep through the night with </= 2 back related sleep disturbances for improved QOL.  -MO     LTG 5 Progress New  -MO     LTG 6 Pt will report </= 1 instance of back locking up in 5 week time period  -MO     LTG 6 Progress New  -MO        Time Calculation    PT Goal Re-Cert Due Date 05/19/24  -MO               User Key  (r) = Recorded By, (t) = Taken By, (c) = Cosigned By      Initials Name Provider Type    Beryl Aranda, PT Physical Therapist                     PT Assessment/Plan       Row Name 02/19/24 1500          PT Assessment    Functional Limitations Limitations in community activities;Performance in leisure activities;Performance in sport activities  -MO     Impairments Impaired flexibility;Posture;Poor body mechanics;Pain;Muscle strength;Range of motion  -MO     Assessment Comments Serafin Hartmann is a 58 y.o. male referred to physical therapy chronic LBP, specifically right sided, without radicular symptoms. He presents with an evolving clinical presentation, along with no  remarkable comorbidities and personal factors of chronicity of symptoms and current high intensity active life style  that may impact his progress in the plan of care. Pt presents today with noted postural impairments including mild sway back, significant hypomobility throughout thoracic spine with increased kyphosis in prone positioning, slight hypermobility noted through L2-L4 with pain to midline palpation and guarded paraspinals and QL. MMT of LE demo hip flexor and glute weakness BL. When testing glute strength in prone, noted BL paraspinal compensation. Additionally pt with BL hamstring, ITB, and hip flexor restrictions, isolated R knee lateral joint line pain, impaired trunk ROM into flexion, and overall has high pain irritability.  His signs and symptoms are consistent with referring diagnosis of lumbar facet arthropathy. Pt will benefit from skilled PT to address the previous impairments and return to PLOF.  -MO     Please refer to paper survey for additional self-reported information No  -MO     Rehab Potential Good  -MO     Patient/caregiver participated in establishment of treatment plan and goals Yes  -MO     Patient would benefit from skilled therapy intervention Yes  -MO        PT Plan    PT Frequency 2x/week  -MO     Predicted Duration of Therapy Intervention (PT) 12 visits  -MO     Planned CPT's? PT EVAL LOW COMPLEXITY: 48381;PT RE-EVAL: 81764;PT THER PROC EA 15 MIN: 89042;PT THER ACT EA 15 MIN: 35989;PT MANUAL THERAPY EA 15 MIN: 94483;PT NEUROMUSC RE-EDUCATION EA 15 MIN: 38400;PT GAIT TRAINING EA 15 MIN: 87413;PT SELF CARE/HOME MGMT/TRAIN EA 15: 81817;PT HOT OR COLD PACK TREAT [facility];PT ELECTRICAL STIM UNATTEND: ;PT ELECTRICAL STIM ATTD EA 15 MIN: 93871;PT ULTRASOUND EA 15 MIN: 33956;PT TRACTION LUMBAR: 80267  -MO     PT Plan Comments nustep warm up. How was initial HEP stretching and increasing stretch time at the gym daily? Will begin to incorporate deep core activation and strengthening as  well as posterior chain strength and muscle isolation. Could consider manual therapy to thoracic spine, soft tissue work to paraspinals and hamstrings or manual hamstring stretching. Nustep, PPT with TrA activation, dead bug, bridge vs SL bridge, prone glute activation, seated march on swiss ball, AR press. Will initially need very isolating exercises to ensure proper activation without overcompensation  -MO               User Key  (r) = Recorded By, (t) = Taken By, (c) = Cosigned By      Initials Name Provider Type    Beryl Aranda, PT Physical Therapist                       OP Exercises       Row Name 02/19/24 1400 02/19/24 1000          Total Minutes    04710 - PT Therapeutic Exercise Minutes 8  -MO --        Exercise 2    Exercise Name 2 -- piriformis stretch  -MO     Cueing 2 -- Verbal  -MO     Sets 2 -- 2 BL  -MO     Reps 2 -- 20s  -MO        Exercise 3    Exercise Name 3 -- Seated HS stretch  -MO     Cueing 3 -- Verbal;Demo  -MO     Sets 3 -- 1 BL  -MO     Reps 3 -- --  -MO     Time 3 -- 20s  -MO        Exercise 4    Exercise Name 4 -- Standing HF stretch  -MO     Cueing 4 -- Verbal;Demo  -MO     Sets 4 -- 1 BL  -MO     Time 4 -- 20s  -MO               User Key  (r) = Recorded By, (t) = Taken By, (c) = Cosigned By      Initials Name Provider Type    Beryl Aranda, PT Physical Therapist                                  Outcome Measure Options: Modified Oswestry  Modified Oswestry  Modified Oswestry Score/Comments: 18/50 - 36%      Time Calculation:     Start Time: 1015  Stop Time: 1100  Time Calculation (min): 45 min  Timed Charges  74792 - PT Therapeutic Exercise Minutes: 8  12657 - PT Self Care/Mgmt Minutes: 5  Untimed Charges  PT Eval/Re-eval Minutes: 32  Total Minutes  Timed Charges Total Minutes: 13  Untimed Charges Total Minutes: 32   Total Minutes: 45     Therapy Charges for Today       Code Description Service Date Service Provider Modifiers Qty    [id number]  PT THER PROC EA 15 MIN 2/19/2024  Beryl Barlow, PT GP 1    [id number] HC PT EVAL LOW COMPLEXITY 2 2/19/2024 Beryl Barlow, PT GP 1            PT G-Codes  Outcome Measure Options: Modified Oswestry  Modified Oswestry Score/Comments: 18/50 - 36%         Beryl Barlow, PT  2/19/2024

## 2024-03-01 ENCOUNTER — HOSPITAL ENCOUNTER (OUTPATIENT)
Dept: PHYSICAL THERAPY | Facility: HOSPITAL | Age: 59
Setting detail: THERAPIES SERIES
Discharge: HOME | End: 2024-03-01
Payer: MEDICAID

## 2024-03-01 DIAGNOSIS — R29.898 WEAKNESS OF BOTH LOWER EXTREMITIES: ICD-10-CM

## 2024-03-01 DIAGNOSIS — M54.50 CHRONIC RIGHT-SIDED LOW BACK PAIN WITHOUT SCIATICA: Primary | ICD-10-CM

## 2024-03-01 DIAGNOSIS — R26.89 DECREASED SPINAL MOBILITY: ICD-10-CM

## 2024-03-01 DIAGNOSIS — G89.29 CHRONIC RIGHT-SIDED LOW BACK PAIN WITHOUT SCIATICA: Primary | ICD-10-CM

## 2024-03-01 DIAGNOSIS — R29.3 POSTURE IMBALANCE: ICD-10-CM

## 2024-03-01 PROCEDURE — 97110 THERAPEUTIC EXERCISES: CPT

## 2024-03-01 NOTE — THERAPY TREATMENT NOTE
Outpatient Physical Therapy Ortho Treatment Note  [facility]     Patient Name: Serafin Hartmann  : 1965  MRN: [id number]  Today's Date: 3/1/2024      Visit Date: 2024    Visit Dx:    ICD-10-CM ICD-9-CM   1. Chronic right-sided low back pain without sciatica  M54.50 724.2    G89.29 338.29   2. Weakness of both lower extremities  R29.898 729.89   3. Posture imbalance  R29.3 729.90   4. Decreased spinal mobility  R26.89 V48.3       Patient Active Problem List   Diagnosis    Primary osteoarthritis of left hip    Status post left hip replacement    Routine adult health maintenance    Colon polyps    Psoriasis of scalp    Ganglion of right wrist    Idiopathic chronic gout of multiple sites with tophus    Atypical mole    Primary osteoarthritis involving multiple joints        Past Medical History:   Diagnosis Date    Allergic     GERD (gastroesophageal reflux disease)     Prostatitis         Past Surgical History:   Procedure Laterality Date    BASAL CELL CARCINOMA EXCISION  2023    carcinoma basal cell removal off his nose    COLONOSCOPY N/A 10/13/2020    Procedure: COLONOSCOPY INTO CECUM WITH POLYPECTOMY X 3, CLIP X 1;  Surgeon: Carlito Mcdaniels MD;  Location: [facility] ENDOSCOPY;  Service: General;  Laterality: N/A;  pre: screening  post: colon polyps x 3    CYST REMOVAL Right 2023    eyebrow    HAND SURGERY Right     HIP SURGERY      KNEE SURGERY Right                         PT Assessment/Plan       Row Name 24 0900          PT Assessment    Assessment Comments Pt returns for first follow up session after initial eval reporting good compliance with HEP. Focused on thoracic mobility into extension and rotation with addition of open books, supine over foam roller both with good tolerance. Also initiated single leg bridge,  AR press and shoulder extension with emphasis on deep core engagement. Pt requires cues for technique and reports soreness in his low back toward the end of  session, encouraged compliance with current program, will update HEP next session.  -RS        PT Plan    PT Plan Comments Update HEP, consider prone hip ext, dead bug  -RS               User Key  (r) = Recorded By, (t) = Taken By, (c) = Cosigned By      Initials Name Provider Type    RS Claribel Ronquillo, PT Physical Therapist                       OP Exercises       Row Name 03/01/24 0800             Subjective    Subjective Comments Pt reports no significant change since last session, he has been holding the stretches longer  -RS         Total Minutes    96755 - PT Therapeutic Exercise Minutes 40  -RS         Exercise 2    Exercise Name 2 piriformis stretch  -RS      Cueing 2 --  -RS      Sets 2 --  -RS      Reps 2 --  -RS         Exercise 3    Exercise Name 3 Seated HS stretch  -RS      Cueing 3 Verbal;Demo  -RS      Sets 3 2  -RS      Time 3 30  -RS         Exercise 4    Exercise Name 4 PPT with kick out  -RS      Cueing 4 Verbal;Demo  -RS      Sets 4 --  -RS      Reps 4 10ea  -RS      Time 4 --  -RS         Exercise 5    Exercise Name 5 rec bike  -RS      Time 5 5 min  -RS         Exercise 6    Exercise Name 6 supine over foam roll  -RS      Cueing 6 Verbal;Demo  -RS      Reps 6 10 alt should flex  -RS      Time 6 blue noodle  -RS         Exercise 7    Exercise Name 7 thoracic rotation in sidelying  -RS      Cueing 7 Verbal;Demo  -RS      Reps 7 8  -RS      Time 7 5-10  -RS      Additional Comments hand behind head  -RS         Exercise 8    Exercise Name 8 sl bridge  -RS      Cueing 8 Verbal;Demo  -RS      Sets 8 2  -RS      Reps 8 10  -RS      Time 8 3  -RS      Additional Comments cues for stability  -RS         Exercise 9    Exercise Name 9 AR press  -RS      Cueing 9 Verbal;Demo  -RS      Sets 9 2  -RS      Reps 9 10  -RS      Time 9 RTB  -RS         Exercise 10    Exercise Name 10 SLS shoulder ext  -RS      Cueing 10 Verbal;Demo  -RS      Reps 10 15  -RS      Time 10 RTB  -RS                User Key  (r)  = Recorded By, (t) = Taken By, (c) = Cosigned By      Initials Name Provider Type    Claribel Brand, PT Physical Therapist                                  PT OP Goals       Row Name 03/01/24 0800          PT Short Term Goals    STG Date to Achieve 03/20/24  -RS     STG 1 Patient will be independent with education for symptom management and initial HEP to decrease LBP pain.  -RS     STG 1 Progress Ongoing  -RS     STG 2 Patient will demonstrate proper log roll mechanics with little to no cues when getting on/off treatment table to demonstrate improved mechanics and decreased strain on lumbar spine.  -RS     STG 2 Progress Ongoing  -RS     STG 3 Pt will improve gross B hip flexion strength to at least 4+/5.  -RS     STG 3 Progress Ongoing  -RS     STG 4 Pt will report >/= 20% improvement in morning pain symptoms  -RS     STG 4 Progress Ongoing  -RS        Long Term Goals    LTG Date to Achieve 04/19/24  -RS     LTG 1 Patient will be independent with education for symptom management and advanced HEP to decrease LBP pain.  -RS     LTG 1 Progress Ongoing  -RS     LTG 2 Patient will reduce level of percieved disability as measured by the Modified Oswestry  from 36% disability to </= 25% disability in order to improve quality of life.  -RS     LTG 2 Progress Ongoing  -RS     LTG 3 Pt will improve gross LE strength to 5/5 bilaterally  -RS     LTG 3 Progress Ongoing  -RS     LTG 4 Pt will demonstrate appropriate body mechanics for bending/lifting tasks to improve tolerance to ADLs.  -RS     LTG 4 Progress Ongoing  -RS     LTG 5 Patient will improve ability to sleep through the night with </= 2 back related sleep disturbances for improved QOL.  -RS     LTG 5 Progress Ongoing  -RS     LTG 6 Pt will report </= 1 instance of back locking up in 5 week time period  -RS     LTG 6 Progress Ongoing  -RS               User Key  (r) = Recorded By, (t) = Taken By, (c) = Cosigned By      Initials Name Provider Type    CABRERA Ronquillo  Claribel PT Physical Therapist                    Therapy Education  Given: HEP  Program: Reinforced  How Provided: Verbal, Demonstration  Provided to: Patient  Level of Understanding: Verbalized, Demonstrated              Time Calculation:   Start Time: 0830  Stop Time: 0912  Time Calculation (min): 42 min  Timed Charges  97147 - PT Therapeutic Exercise Minutes: 40  Total Minutes  Timed Charges Total Minutes: 40   Total Minutes: 40  Therapy Charges for Today       Code Description Service Date Service Provider Modifiers Qty    08298648779 HC PT THER PROC EA 15 MIN 3/1/2024 Claribel Ronquillo, PT GP 3                      Claribel Ronquillo PT  3/1/2024

## 2024-03-04 ENCOUNTER — HOSPITAL ENCOUNTER (OUTPATIENT)
Dept: PHYSICAL THERAPY | Facility: HOSPITAL | Age: 59
Setting detail: THERAPIES SERIES
Discharge: HOME | End: 2024-03-04
Payer: MEDICAID

## 2024-03-04 DIAGNOSIS — G89.29 CHRONIC RIGHT-SIDED LOW BACK PAIN WITHOUT SCIATICA: Primary | ICD-10-CM

## 2024-03-04 DIAGNOSIS — M54.50 CHRONIC RIGHT-SIDED LOW BACK PAIN WITHOUT SCIATICA: Primary | ICD-10-CM

## 2024-03-04 DIAGNOSIS — R26.89 DECREASED SPINAL MOBILITY: ICD-10-CM

## 2024-03-04 DIAGNOSIS — R29.898 WEAKNESS OF BOTH LOWER EXTREMITIES: ICD-10-CM

## 2024-03-04 DIAGNOSIS — R29.3 POSTURE IMBALANCE: ICD-10-CM

## 2024-03-04 PROCEDURE — 97110 THERAPEUTIC EXERCISES: CPT

## 2024-03-04 NOTE — THERAPY TREATMENT NOTE
Outpatient Physical Therapy Ortho Treatment Note  [facility]     Patient Name: Serafin Hartmann  : 1965  MRN: [id number]  Today's Date: 3/4/2024      Visit Date: 2024    Visit Dx:    ICD-10-CM ICD-9-CM   1. Chronic right-sided low back pain without sciatica  M54.50 724.2    G89.29 338.29   2. Weakness of both lower extremities  R29.898 729.89   3. Posture imbalance  R29.3 729.90   4. Decreased spinal mobility  R26.89 V48.3       Patient Active Problem List   Diagnosis    Primary osteoarthritis of left hip    Status post left hip replacement    Routine adult health maintenance    Colon polyps    Psoriasis of scalp    Ganglion of right wrist    Idiopathic chronic gout of multiple sites with tophus    Atypical mole    Primary osteoarthritis involving multiple joints        Past Medical History:   Diagnosis Date    Allergic     GERD (gastroesophageal reflux disease)     Prostatitis         Past Surgical History:   Procedure Laterality Date    BASAL CELL CARCINOMA EXCISION  2023    carcinoma basal cell removal off his nose    COLONOSCOPY N/A 10/13/2020    Procedure: COLONOSCOPY INTO CECUM WITH POLYPECTOMY X 3, CLIP X 1;  Surgeon: Carlito Mcdaniels MD;  Location: [facility] ENDOSCOPY;  Service: General;  Laterality: N/A;  pre: screening  post: colon polyps x 3    CYST REMOVAL Right 2023    eyebrow    HAND SURGERY Right     HIP SURGERY      KNEE SURGERY Right                         PT Assessment/Plan       Row Name 24 1100          PT Assessment    Assessment Comments Serafin returns this date reporting some mild soreness in low back following last session, remained adherent to HEP with daily stretching. Spent additional time this date working on pelvic mobility with inclusion of TrA activation, working on full pelvic rocking and strict PPT without paraspinal activation. He does demo good improvement in movement towards end of reps. Modified bridges to BL this date as he immediate has  ipsilateral paraspinal/QL activation with first lift, however denies onset of pain with bilateral. Spent time discussing pain free exercises at home to reduce overall activation and emphasized deep core activation in all positions. Updated HEP, will continue to progress as appropriate.  -MO        PT Plan    PT Plan Comments prone glute activation, SL QL stretch  -MO               User Key  (r) = Recorded By, (t) = Taken By, (c) = Cosigned By      Initials Name Provider Type    Beryl Aranda, PT Physical Therapist                       OP Exercises       Row Name 03/04/24 1000             Subjective    Subjective Comments Already warmed up this date. Was sore  -MO         Total Minutes    70605 - PT Therapeutic Exercise Minutes 40  -MO         Exercise 4    Exercise Name 4 PPT with kick out  -MO      Cueing 4 Verbal  -MO      Reps 4 10ea  -MO         Exercise 5    Exercise Name 5 PPT with TrA activation  -MO      Cueing 5 Verbal;Tactile  -MO      Sets 5 1  -MO      Reps 5 20  -MO      Additional Comments cueing for PFactivation  -MO         Exercise 6    Exercise Name 6 supine over foam roll: alt shoulder flex, snow larry  -MO      Cueing 6 Verbal;Demo  -MO      Reps 6 x15e  -MO      Time 6 blue noodle  -MO         Exercise 7    Exercise Name 7 wall slides  -MO      Cueing 7 Verbal  -MO      Sets 7 1  -MO      Reps 7 10  -MO      Time 7 vertical  -MO         Exercise 8    Exercise Name 8 bridge  -MO      Cueing 8 Verbal  -MO      Sets 8 2  -MO      Reps 8 10  -MO      Time 8 3s  -MO      Additional Comments cueing for PPT with TrA to start  -MO         Exercise 10    Exercise Name 10 SLS shoulder ext  -MO      Cueing 10 Verbal  -MO      Sets 10 2  -MO      Reps 10 10  -MO      Time 10 RTB  -MO                User Key  (r) = Recorded By, (t) = Taken By, (c) = Cosigned By      Initials Name Provider Type    Beryl Aranda, PT Physical Therapist                                  PT OP Goals       Row Name 03/04/24  1000          PT Short Term Goals    STG Date to Achieve 03/20/24  -MO     STG 1 Patient will be independent with education for symptom management and initial HEP to decrease LBP pain.  -MO     STG 1 Progress Ongoing  -MO     STG 2 Patient will demonstrate proper log roll mechanics with little to no cues when getting on/off treatment table to demonstrate improved mechanics and decreased strain on lumbar spine.  -MO     STG 2 Progress Ongoing  -MO     STG 3 Pt will improve gross B hip flexion strength to at least 4+/5.  -MO     STG 3 Progress Ongoing  -MO     STG 4 Pt will report >/= 20% improvement in morning pain symptoms  -MO     STG 4 Progress Ongoing  -MO        Long Term Goals    LTG Date to Achieve 04/19/24  -MO     LTG 1 Patient will be independent with education for symptom management and advanced HEP to decrease LBP pain.  -MO     LTG 1 Progress Ongoing  -MO     LTG 2 Patient will reduce level of percieved disability as measured by the Modified Oswestry  from 36% disability to </= 25% disability in order to improve quality of life.  -MO     LTG 2 Progress Ongoing  -MO     LTG 3 Pt will improve gross LE strength to 5/5 bilaterally  -MO     LTG 3 Progress Ongoing  -MO     LTG 4 Pt will demonstrate appropriate body mechanics for bending/lifting tasks to improve tolerance to ADLs.  -MO     LTG 4 Progress Ongoing  -MO     LTG 5 Patient will improve ability to sleep through the night with </= 2 back related sleep disturbances for improved QOL.  -MO     LTG 5 Progress Ongoing  -MO     LTG 6 Pt will report </= 1 instance of back locking up in 5 week time period  -MO     LTG 6 Progress Ongoing  -MO               User Key  (r) = Recorded By, (t) = Taken By, (c) = Cosigned By      Initials Name Provider Type    Beryl Aranda, PT Physical Therapist                    Therapy Education  Education Details: [id number]              Time Calculation:   Start Time: 1015  Stop Time: 1055  Time Calculation (min): 40 min  Timed  Charges  72917 - PT Therapeutic Exercise Minutes: 40  Total Minutes  Timed Charges Total Minutes: 40   Total Minutes: 40  Therapy Charges for Today       Code Description Service Date Service Provider Modifiers Qty    74028570065  PT THER PROC EA 15 MIN 3/4/2024 Beryl Barlow, PT GP 3                      Beryl Barlow, PT  3/4/2024

## 2024-03-08 ENCOUNTER — APPOINTMENT (OUTPATIENT)
Dept: PHYSICAL THERAPY | Facility: HOSPITAL | Age: 59
End: 2024-03-08
Payer: MEDICAID

## 2024-03-11 ENCOUNTER — HOSPITAL ENCOUNTER (OUTPATIENT)
Dept: PHYSICAL THERAPY | Facility: HOSPITAL | Age: 59
Setting detail: THERAPIES SERIES
Discharge: HOME | End: 2024-03-11
Payer: MEDICAID

## 2024-03-11 DIAGNOSIS — R26.89 DECREASED SPINAL MOBILITY: ICD-10-CM

## 2024-03-11 DIAGNOSIS — R29.3 POSTURE IMBALANCE: ICD-10-CM

## 2024-03-11 DIAGNOSIS — G89.29 CHRONIC RIGHT-SIDED LOW BACK PAIN WITHOUT SCIATICA: Primary | ICD-10-CM

## 2024-03-11 DIAGNOSIS — R29.898 WEAKNESS OF BOTH LOWER EXTREMITIES: ICD-10-CM

## 2024-03-11 DIAGNOSIS — M54.50 CHRONIC RIGHT-SIDED LOW BACK PAIN WITHOUT SCIATICA: Primary | ICD-10-CM

## 2024-03-11 PROCEDURE — 97110 THERAPEUTIC EXERCISES: CPT

## 2024-03-11 NOTE — THERAPY TREATMENT NOTE
Outpatient Physical Therapy Ortho Treatment Note  [facility]     Patient Name: Serafin Hartmann  : 1965  MRN: [id number]  Today's Date: 3/11/2024      Visit Date: 2024    Visit Dx:    ICD-10-CM ICD-9-CM   1. Chronic right-sided low back pain without sciatica  M54.50 724.2    G89.29 338.29   2. Weakness of both lower extremities  R29.898 729.89   3. Posture imbalance  R29.3 729.90   4. Decreased spinal mobility  R26.89 V48.3       Patient Active Problem List   Diagnosis    Primary osteoarthritis of left hip    Status post left hip replacement    Routine adult health maintenance    Colon polyps    Psoriasis of scalp    Ganglion of right wrist    Idiopathic chronic gout of multiple sites with tophus    Atypical mole    Primary osteoarthritis involving multiple joints        Past Medical History:   Diagnosis Date    Allergic     GERD (gastroesophageal reflux disease)     Prostatitis         Past Surgical History:   Procedure Laterality Date    BASAL CELL CARCINOMA EXCISION  2023    carcinoma basal cell removal off his nose    COLONOSCOPY N/A 10/13/2020    Procedure: COLONOSCOPY INTO CECUM WITH POLYPECTOMY X 3, CLIP X 1;  Surgeon: Carlito Mcdaniels MD;  Location: [facility] ENDOSCOPY;  Service: General;  Laterality: N/A;  pre: screening  post: colon polyps x 3    CYST REMOVAL Right 2023    eyebrow    HAND SURGERY Right     HIP SURGERY      KNEE SURGERY Right                         PT Assessment/Plan       Row Name 24 1100          PT Assessment    Assessment Comments Spent majority of session this date working on differentiating glute vs paraspinal activation in several positions. Unable to isolate in prone, good improved demo'd in standing and improves with several reps. Progressed bridges to stagger stance, increased challenge with emphasis on RLE, mild L hip drop with pt reporting onset of some low back symptoms. He continues to be flared up at session termination, discussed  importance of intentional core/glute exercises at home while limiting onset of back musculature, as well as realistic time frame for healing. Reviewed hip flexor stretches, updated HEP, will progress as appropriate.  -MO        PT Plan    PT Plan Comments qped vs over swiss ball extension, modified mountain climber on barre  -MO               User Key  (r) = Recorded By, (t) = Taken By, (c) = Cosigned By      Initials Name Provider Type    Beryl Aranda, PT Physical Therapist                       OP Exercises       Row Name 03/11/24 1000             Total Minutes    87681 - PT Therapeutic Exercise Minutes 40  -MO         Exercise 2    Exercise Name 2 SL QL stretch  -MO      Cueing 2 Verbal  -MO      Sets 2 1 BL  -MO      Reps 2 90s  -MO         Exercise 4    Exercise Name 4 PPT with kick out  -MO      Cueing 4 Verbal  -MO      Sets 4 3 BL  -MO      Reps 4 10e  -MO         Exercise 5    Exercise Name 5 prone glute activation  -MO      Cueing 5 Verbal;Tactile  -MO      Sets 5 1  -MO      Reps 5 10  -MO      Time 5 3s  -MO         Exercise 6    Exercise Name 6 glute ext towel slide  -MO      Cueing 6 Verbal;Tactile;Demo  -MO      Sets 6 2 BL  -MO      Reps 6 10  -MO         Exercise 8    Exercise Name 8 stagger stance bridge  -MO      Cueing 8 Verbal  -MO      Sets 8 3  -MO      Reps 8 10  -MO         Exercise 11    Exercise Name 11 standing hip flex  -MO      Cueing 11 Verbal;Demo;Tactile  -MO      Sets 11 2 BL  -MO      Reps 11 10  -MO      Time 11 YTB  -MO                User Key  (r) = Recorded By, (t) = Taken By, (c) = Cosigned By      Initials Name Provider Type    Beryl Aranda PT Physical Therapist                                  PT OP Goals       Row Name 03/11/24 1100          PT Short Term Goals    STG Date to Achieve 03/20/24  -MO     STG 1 Patient will be independent with education for symptom management and initial HEP to decrease LBP pain.  -MO     STG 1 Progress Ongoing  -MO     STG 2 Patient  will demonstrate proper log roll mechanics with little to no cues when getting on/off treatment table to demonstrate improved mechanics and decreased strain on lumbar spine.  -MO     STG 2 Progress Ongoing  -MO     STG 3 Pt will improve gross B hip flexion strength to at least 4+/5.  -MO     STG 3 Progress Ongoing  -MO     STG 4 Pt will report >/= 20% improvement in morning pain symptoms  -MO     STG 4 Progress Ongoing  -MO        Long Term Goals    LTG Date to Achieve 04/19/24  -MO     LTG 1 Patient will be independent with education for symptom management and advanced HEP to decrease LBP pain.  -MO     LTG 1 Progress Ongoing  -MO     LTG 2 Patient will reduce level of percieved disability as measured by the Modified Oswestry  from 36% disability to </= 25% disability in order to improve quality of life.  -MO     LTG 2 Progress Ongoing  -MO     LTG 3 Pt will improve gross LE strength to 5/5 bilaterally  -MO     LTG 3 Progress Ongoing  -MO     LTG 4 Pt will demonstrate appropriate body mechanics for bending/lifting tasks to improve tolerance to ADLs.  -MO     LTG 4 Progress Ongoing  -MO     LTG 5 Patient will improve ability to sleep through the night with </= 2 back related sleep disturbances for improved QOL.  -MO     LTG 5 Progress Ongoing  -MO     LTG 6 Pt will report </= 1 instance of back locking up in 5 week time period  -MO     LTG 6 Progress Ongoing  -MO               User Key  (r) = Recorded By, (t) = Taken By, (c) = Cosigned By      Initials Name Provider Type    Beryl Aranda PT Physical Therapist                                   Time Calculation:   Start Time: 1018  Stop Time: 1058  Time Calculation (min): 40 min  Timed Charges  95928 - PT Therapeutic Exercise Minutes: 40  Total Minutes  Timed Charges Total Minutes: 40   Total Minutes: 40  Therapy Charges for Today       Code Description Service Date Service Provider Modifiers Qty    75624100264 HC PT THER PROC EA 15 MIN 3/11/2024 Beryl Barlow PT  GP 3                      Beryl Barlow, PT  3/11/2024

## 2024-03-15 ENCOUNTER — HOSPITAL ENCOUNTER (OUTPATIENT)
Dept: PHYSICAL THERAPY | Facility: HOSPITAL | Age: 59
Setting detail: THERAPIES SERIES
Discharge: HOME | End: 2024-03-15
Payer: MEDICAID

## 2024-03-15 DIAGNOSIS — R29.898 WEAKNESS OF BOTH LOWER EXTREMITIES: ICD-10-CM

## 2024-03-15 DIAGNOSIS — M54.50 CHRONIC RIGHT-SIDED LOW BACK PAIN WITHOUT SCIATICA: Primary | ICD-10-CM

## 2024-03-15 DIAGNOSIS — R26.89 DECREASED SPINAL MOBILITY: ICD-10-CM

## 2024-03-15 DIAGNOSIS — G89.29 CHRONIC RIGHT-SIDED LOW BACK PAIN WITHOUT SCIATICA: Primary | ICD-10-CM

## 2024-03-15 DIAGNOSIS — R29.3 POSTURE IMBALANCE: ICD-10-CM

## 2024-03-15 PROCEDURE — 97110 THERAPEUTIC EXERCISES: CPT

## 2024-03-15 NOTE — THERAPY TREATMENT NOTE
Outpatient Physical Therapy Ortho Treatment Note  [facility]     Patient Name: Serafin Hartmann  : 1965  MRN: [id number]  Today's Date: 3/15/2024      Visit Date: 03/15/2024    Visit Dx:    ICD-10-CM ICD-9-CM   1. Chronic right-sided low back pain without sciatica  M54.50 724.2    G89.29 338.29   2. Weakness of both lower extremities  R29.898 729.89   3. Posture imbalance  R29.3 729.90   4. Decreased spinal mobility  R26.89 V48.3       Patient Active Problem List   Diagnosis    Primary osteoarthritis of left hip    Status post left hip replacement    Routine adult health maintenance    Colon polyps    Psoriasis of scalp    Ganglion of right wrist    Idiopathic chronic gout of multiple sites with tophus    Atypical mole    Primary osteoarthritis involving multiple joints        Past Medical History:   Diagnosis Date    Allergic     GERD (gastroesophageal reflux disease)     Prostatitis         Past Surgical History:   Procedure Laterality Date    BASAL CELL CARCINOMA EXCISION  2023    carcinoma basal cell removal off his nose    COLONOSCOPY N/A 10/13/2020    Procedure: COLONOSCOPY INTO CECUM WITH POLYPECTOMY X 3, CLIP X 1;  Surgeon: Carlito Mcdaniels MD;  Location: [facility] ENDOSCOPY;  Service: General;  Laterality: N/A;  pre: screening  post: colon polyps x 3    CYST REMOVAL Right 2023    eyebrow    HAND SURGERY Right     HIP SURGERY      KNEE SURGERY Right                         PT Assessment/Plan       Row Name 03/15/24 0900          PT Assessment    Assessment Comments Serafin returns to threapy this date reporting mild improvement in overall discomfort following last session. Continued to work on isolating glutes and deep core without paraspinal activation, with noted improvement. Added half kneel hip flexor stretch as well as quad hip ext and modified mountain climbers. Very challenged with neutral stability in qped positioning however improved with PT assist at hips for stabilization, does  have palpable paraspinal activation. Fatigues at session termination on last couple reps of mountain climbers however overall a big improvement from previous exercises. Updated HEP, will continue to progress as appropriate.  -MO        PT Plan    PT Plan Comments AR press- could try half kneel AR  -MO               User Key  (r) = Recorded By, (t) = Taken By, (c) = Cosigned By      Initials Name Provider Type    Beryl Aranda, PT Physical Therapist                       OP Exercises       Row Name 03/15/24 0800             Total Minutes    55881 - PT Therapeutic Exercise Minutes 38  -MO         Exercise 1    Exercise Name 1 half knee hip flexor stretch  -MO      Cueing 1 Verbal;Demo  -MO      Sets 1 1 BL  -MO         Exercise 3    Exercise Name 3 marilou pose  -MO      Cueing 3 Verbal  -MO      Sets 3 1e  -MO      Reps 3 90s  -MO      Time 3 fwd and lateral  -MO         Exercise 4    Exercise Name 4 PPT with kick out  -MO      Cueing 4 Verbal  -MO      Sets 4 3 BL  -MO      Reps 4 10e  -MO         Exercise 7    Exercise Name 7 qped hip ext  -MO      Cueing 7 Verbal;Demo;Tactile  -MO      Reps 7 2 BL  -MO      Time 7 10  -MO         Exercise 8    Exercise Name 8 stagger stance bridge  -MO      Cueing 8 Verbal  -MO      Sets 8 3  -MO      Reps 8 10  -MO         Exercise 9    Exercise Name 9 modified mountain climber  -MO      Cueing 9 Verbal;Demo  -MO      Sets 9 2 BL  -MO      Reps 9 10  -MO                User Key  (r) = Recorded By, (t) = Taken By, (c) = Cosigned By      Initials Name Provider Type    Beryl Aranda, PT Physical Therapist                                  PT OP Goals       Row Name 03/15/24 0900          PT Short Term Goals    STG Date to Achieve 03/20/24  -MO     STG 1 Patient will be independent with education for symptom management and initial HEP to decrease LBP pain.  -MO     STG 1 Progress Ongoing  -MO     STG 2 Patient will demonstrate proper log roll mechanics with little to no cues when  getting on/off treatment table to demonstrate improved mechanics and decreased strain on lumbar spine.  -MO     STG 2 Progress Ongoing  -MO     STG 3 Pt will improve gross B hip flexion strength to at least 4+/5.  -MO     STG 3 Progress Ongoing  -MO     STG 4 Pt will report >/= 20% improvement in morning pain symptoms  -MO     STG 4 Progress Ongoing  -MO        Long Term Goals    LTG Date to Achieve 04/19/24  -MO     LTG 1 Patient will be independent with education for symptom management and advanced HEP to decrease LBP pain.  -MO     LTG 1 Progress Ongoing  -MO     LTG 2 Patient will reduce level of percieved disability as measured by the Modified Oswestry  from 36% disability to </= 25% disability in order to improve quality of life.  -MO     LTG 2 Progress Ongoing  -MO     LTG 3 Pt will improve gross LE strength to 5/5 bilaterally  -MO     LTG 3 Progress Ongoing  -MO     LTG 4 Pt will demonstrate appropriate body mechanics for bending/lifting tasks to improve tolerance to ADLs.  -MO     LTG 4 Progress Ongoing  -MO     LTG 5 Patient will improve ability to sleep through the night with </= 2 back related sleep disturbances for improved QOL.  -MO     LTG 5 Progress Ongoing  -MO     LTG 6 Pt will report </= 1 instance of back locking up in 5 week time period  -MO     LTG 6 Progress Ongoing  -MO               User Key  (r) = Recorded By, (t) = Taken By, (c) = Cosigned By      Initials Name Provider Type    Beryl Aranda, PT Physical Therapist                                   Time Calculation:   Start Time: 0830  Stop Time: 0908  Time Calculation (min): 38 min  Timed Charges  84878 - PT Therapeutic Exercise Minutes: 38  Total Minutes  Timed Charges Total Minutes: 38   Total Minutes: 38  Therapy Charges for Today       Code Description Service Date Service Provider Modifiers Qty    64020507210 HC PT THER PROC EA 15 MIN 3/15/2024 Beryl Barlow, PT GP 3                      Beryl Barlow PT  3/15/2024

## 2024-03-18 ENCOUNTER — OFFICE VISIT (OUTPATIENT)
Dept: PAIN MEDICINE | Facility: CLINIC | Age: 59
End: 2024-03-18
Payer: MEDICAID

## 2024-03-18 ENCOUNTER — PREP FOR SURGERY (OUTPATIENT)
Dept: SURGERY | Facility: SURGERY CENTER | Age: 59
End: 2024-03-18
Payer: MEDICAID

## 2024-03-18 ENCOUNTER — APPOINTMENT (OUTPATIENT)
Dept: PHYSICAL THERAPY | Facility: HOSPITAL | Age: 59
End: 2024-03-18
Payer: MEDICAID

## 2024-03-18 VITALS
SYSTOLIC BLOOD PRESSURE: 143 MMHG | RESPIRATION RATE: 18 BRPM | OXYGEN SATURATION: 98 % | WEIGHT: 182 LBS | TEMPERATURE: 96.2 F | HEIGHT: 73 IN | HEART RATE: 44 BPM | BODY MASS INDEX: 24.12 KG/M2 | DIASTOLIC BLOOD PRESSURE: 76 MMHG

## 2024-03-18 DIAGNOSIS — M47.817 LUMBOSACRAL SPONDYLOSIS WITHOUT MYELOPATHY: Primary | ICD-10-CM

## 2024-03-18 DIAGNOSIS — M51.36 DDD (DEGENERATIVE DISC DISEASE), LUMBAR: ICD-10-CM

## 2024-03-18 PROBLEM — M51.369 DDD (DEGENERATIVE DISC DISEASE), LUMBAR: Status: ACTIVE | Noted: 2024-03-18

## 2024-03-18 PROCEDURE — 1160F RVW MEDS BY RX/DR IN RCRD: CPT | Performed by: PHYSICIAN ASSISTANT

## 2024-03-18 PROCEDURE — 99214 OFFICE O/P EST MOD 30 MIN: CPT | Performed by: PHYSICIAN ASSISTANT

## 2024-03-18 PROCEDURE — 1125F AMNT PAIN NOTED PAIN PRSNT: CPT | Performed by: PHYSICIAN ASSISTANT

## 2024-03-18 PROCEDURE — 1159F MED LIST DOCD IN RCRD: CPT | Performed by: PHYSICIAN ASSISTANT

## 2024-03-18 NOTE — PROGRESS NOTES
"CHIEF COMPLAINT  Follow-up for back pain.    Subjective   Serafin Hartmann is a 58 y.o. male  who presents for follow-up.  He has a history of primarily right-sided lumbosacral spine pain.  The patient continues to experience pain with episodes of becoming very sharp and \"locking up.\"  He continues to experience pain that wakes him up at night when trying to turn over in the bed as well as with prolonged sitting in a chair.  He is no longer experiencing numbness affecting the right lower extremity.    This patient has been attending physical therapy at [facility] for the past 6 weeks attending on Monday and Friday with minimal relief.    Pain today 2/10 VAS in severity.      Back Pain  This is a chronic problem. The current episode started more than 1 year ago. The problem occurs constantly. The problem has been worse since onset. The pain is present in the lumbar spine. The quality of the pain is described as aching. The pain does not radiate. The pain is at a severity of 2/10. The pain is mild. The pain is Worse during the night. The symptoms are aggravated by sitting (turning over in the bed). Associated symptoms include numbness. Pertinent negatives include no abdominal pain, chest pain, dysuria, fever, headaches or weakness. He has tried home exercises for the symptoms. The treatment provided mild relief.        PEG Assessment   What number best describes your pain on average in the past week?4  What number best describes how, during the past week, pain has interfered with your enjoyment of life?3  What number best describes how, during the past week, pain has interfered with your general activity?  2    Review of Pertinent Medical Data ---            The following portions of the patient's history were reviewed and updated as appropriate: allergies, current medications, past family history, past medical history, past social history, past surgical history, and problem list.    Review of Systems " "  Constitutional:  Negative for fever.   Cardiovascular:  Negative for chest pain.   Gastrointestinal:  Negative for abdominal pain, constipation and diarrhea.   Genitourinary:  Negative for difficulty urinating and dysuria.   Musculoskeletal:  Positive for back pain.   Neurological:  Positive for numbness. Negative for weakness and headaches.   Psychiatric/Behavioral:  Positive for sleep disturbance. Negative for suicidal ideas. The patient is not nervous/anxious.      I have reviewed and confirmed the accuracy of the ROS as documented by the MA/LPN/RN CHUYITA Gallego  Vitals:    03/18/24 0803   BP: 143/76   Pulse: (!) 44   Resp: 18   Temp: 96.2 °F (35.7 °C)   SpO2: 98%   Weight: 82.6 kg (182 lb)   Height: 185.4 cm (73\")   PainSc:   2   PainLoc: Back         Objective   Physical Exam  Vitals and nursing note reviewed.   Constitutional:       Appearance: Normal appearance. He is normal weight.   HENT:      Head: Normocephalic.   Pulmonary:      Effort: Pulmonary effort is normal.   Musculoskeletal:      Lumbar back: Tenderness (mild pain to palpation over the right lumbar paraspinal muscles/facet joint spaces) present. Decreased range of motion (pain increased with lumbar extension).        Back:    Skin:     General: Skin is warm and dry.   Neurological:      General: No focal deficit present.      Mental Status: He is alert and oriented to person, place, and time.      Cranial Nerves: Cranial nerves 2-12 are intact.      Sensory: Sensation is intact.      Motor: Motor function is intact.      Gait: Gait is intact.      Deep Tendon Reflexes:      Reflex Scores:       Patellar reflexes are 1+ on the right side and 1+ on the left side.       Achilles reflexes are 1+ on the right side and 1+ on the left side.  Psychiatric:         Mood and Affect: Mood normal.         Behavior: Behavior normal.         Thought Content: Thought content normal.         Judgment: Judgment normal.         Assessment & Plan   Diagnoses " and all orders for this visit:    1. Lumbosacral spondylosis without myelopathy (Primary)    2. DDD (degenerative disc disease), lumbar        Serafin Hartmann reports a pain score of 2.  Given his pain assessment as noted, treatment options were discussed and the following options were decided upon as a follow-up plan to address the patient's pain: continuation of current treatment plan for pain, patient not interested in pharmacological measures, and use of non-medical modalities (ice, heat, stretching and/or behavior modifications).      --- This patient has now completed 6 weeks of physical therapy attending twice weekly with minimal relief of pain therefore I do feel it is reasonable to proceed with #1 diagnostic right L3-L5 MBB with plan to proceed to radiofrequency if favorable response has been obtained.  The risk and benefits of the procedure have been discussed with the patient and all of his questions have been addressed.  --- Follow-up 1 week after injective therapy        Diagnostic Facet Joint Procedure:   MBB     The first diagnostic facet joint procedure is considered medically reasonable and necessary for the diagnosis and treatment of chronic pain for this patient due to the patient meeting all of the following criteria:    - 1. Moderate to severe chronic neck or low back pain, predominantly axial, that causes functional deficit measured on pain or disability scale.  - 2. Pain present for minimum of 3 months with documented failure to respond to noninvasive conservative management (as tolerated)  - 3. Absence of untreated radiculopathy or neurogenic claudication (except for radiculopathy caused by facet joint synovial cyst)  - 4. There is no non-facet pathology per clinical assessment or radiology studies that could explain the source of the patient’s pain, including but not limited to fracture, tumor, infection, or significant deformity.    The confirmatory diagnostic facet joint procedure is  considered medically reasonable and necessary for the diagnosis and treatment of chronic pain for this patient due to the patient meeting all of the following criteria:    - 1. Moderate to severe chronic neck or low back pain, predominantly axial, that causes functional deficit measured on pain or disability scale.  - 2. Pain present for minimum of 3 months with documented failure to respond to noninvasive conservative management (as tolerated)  - 3. Absence of untreated radiculopathy or neurogenic claudication (except for radiculopathy caused by facet joint synovial cyst)  - 4. There is no non-facet pathology per clinical assessment or radiology studies that could explain the source of the patient’s pain, including but not limited to fracture, tumor, infection, or significant deformity.    The patient has also shown a consistent positive response of at least 80% relief of primary (index) pain (with the duration of relief being consistent with the agent used).               Dictated utilizing Dragon dictation.

## 2024-03-19 ENCOUNTER — TRANSCRIBE ORDERS (OUTPATIENT)
Dept: SURGERY | Facility: SURGERY CENTER | Age: 59
End: 2024-03-19
Payer: MEDICAID

## 2024-03-19 DIAGNOSIS — Z41.9 SURGERY, ELECTIVE: Primary | ICD-10-CM

## 2024-03-22 ENCOUNTER — APPOINTMENT (OUTPATIENT)
Dept: PHYSICAL THERAPY | Facility: HOSPITAL | Age: 59
End: 2024-03-22
Payer: MEDICAID

## 2024-03-25 ENCOUNTER — APPOINTMENT (OUTPATIENT)
Dept: PHYSICAL THERAPY | Facility: HOSPITAL | Age: 59
End: 2024-03-25
Payer: MEDICAID

## 2024-04-01 ENCOUNTER — HOSPITAL ENCOUNTER (OUTPATIENT)
Dept: PHYSICAL THERAPY | Facility: HOSPITAL | Age: 59
Setting detail: THERAPIES SERIES
Discharge: HOME | End: 2024-04-01
Payer: MEDICAID

## 2024-04-01 DIAGNOSIS — M54.50 CHRONIC RIGHT-SIDED LOW BACK PAIN WITHOUT SCIATICA: Primary | ICD-10-CM

## 2024-04-01 DIAGNOSIS — G89.29 CHRONIC RIGHT-SIDED LOW BACK PAIN WITHOUT SCIATICA: Primary | ICD-10-CM

## 2024-04-01 DIAGNOSIS — R26.89 DECREASED SPINAL MOBILITY: ICD-10-CM

## 2024-04-01 DIAGNOSIS — R29.3 POSTURE IMBALANCE: ICD-10-CM

## 2024-04-01 DIAGNOSIS — R29.898 WEAKNESS OF BOTH LOWER EXTREMITIES: ICD-10-CM

## 2024-04-01 PROCEDURE — 97530 THERAPEUTIC ACTIVITIES: CPT

## 2024-04-01 NOTE — THERAPY DISCHARGE NOTE
Outpatient Physical Therapy Ortho Treatment Note/Discharge Summary  [facility]     Patient Name: Serafin Hartmann  : 1965  MRN: [id number]  Today's Date: 2024      Visit Date: 2024    Visit Dx:    ICD-10-CM ICD-9-CM   1. Chronic right-sided low back pain without sciatica  M54.50 724.2    G89.29 338.29   2. Weakness of both lower extremities  R29.898 729.89   3. Posture imbalance  R29.3 729.90   4. Decreased spinal mobility  R26.89 V48.3       Patient Active Problem List   Diagnosis    Primary osteoarthritis of left hip    Status post left hip replacement    Routine adult health maintenance    Colon polyps    Psoriasis of scalp    Ganglion of right wrist    Idiopathic chronic gout of multiple sites with tophus    Atypical mole    Primary osteoarthritis involving multiple joints    DDD (degenerative disc disease), lumbar    Lumbosacral spondylosis without myelopathy        Past Medical History:   Diagnosis Date    Allergic     GERD (gastroesophageal reflux disease)     Prostatitis         Past Surgical History:   Procedure Laterality Date    BASAL CELL CARCINOMA EXCISION  2023    carcinoma basal cell removal off his nose    COLONOSCOPY N/A 10/13/2020    Procedure: COLONOSCOPY INTO CECUM WITH POLYPECTOMY X 3, CLIP X 1;  Surgeon: Carlito Mcdaniels MD;  Location: [facility] ENDOSCOPY;  Service: General;  Laterality: N/A;  pre: screening  post: colon polyps x 3    CYST REMOVAL Right 2023    eyebrow    HAND SURGERY Right     HIP SURGERY      KNEE SURGERY Right                         PT Assessment/Plan       Row Name 24 1000          PT Assessment    Assessment Comments Serafin Hartmann was seen for 6 physical therapy sessions for chronic LBP.  Treatment included therapeutic exercise and patient education with home exercise program. Progress to physical therapy goals was unsatisfactory . Pt met 0/4 STG and 0/6 LTG. Goals likely unmet secondary to chronicity of symptoms as well as pts  daily exercise routine and prolonged positioning on exercise bike for >1 hour per day with little activity modification throughout therapy duration. He continues to demo grossly impaired proximal strength, most notably with hip flexors and significantly tight LE musculature, increasing stress demands on low back. He has had good compliance to HEP stretching and has increased hold times for prolonged tissue lengthening as instructed. Discussed need for modification to help fully alleviate symptoms and provide improvement management. At this time, pt reports increased work demands and requests d/c to IND management in prep for ablation trial in upcoming weeks.  Time spent discussing HEP and appropriate progressions/modifications to make based on response following discharge and optimal frequency/duration to complete HEP over next several weeks-months. Patient verbalized understanding. He was discharged to an independent HEP and provided patient education to self-manage condition.  -MO        PT Plan    PT Plan Comments d/c  -MO               User Key  (r) = Recorded By, (t) = Taken By, (c) = Cosigned By      Initials Name Provider Type    Beryl Aranda, PT Physical Therapist                         OP Exercises       Row Name 04/01/24 0900             Subjective    Subjective Comments I've just been so busy with work, yesterday I sat for so long and my back just has been spasming ever since  -MO         Total Minutes    65275 - PT Therapeutic Activity Minutes 10  -MO         Exercise 2    Exercise Name 2 discharge discussion  -MO                User Key  (r) = Recorded By, (t) = Taken By, (c) = Cosigned By      Initials Name Provider Type    Beryl Aranad PT Physical Therapist                                    PT OP Goals       Row Name 04/01/24 0900          PT Short Term Goals    STG Date to Achieve 03/20/24  -MO     STG 1 Patient will be independent with education for symptom management and initial HEP to  decrease LBP pain.  -MO     STG 1 Progress Ongoing  -MO     STG 2 Patient will demonstrate proper log roll mechanics with little to no cues when getting on/off treatment table to demonstrate improved mechanics and decreased strain on lumbar spine.  -MO     STG 2 Progress Ongoing  -MO     STG 3 Pt will improve gross B hip flexion strength to at least 4+/5.  -MO     STG 3 Progress Ongoing  -MO     STG 4 Pt will report >/= 20% improvement in morning pain symptoms  -MO     STG 4 Progress Ongoing  -MO        Long Term Goals    LTG Date to Achieve 04/19/24  -MO     LTG 1 Patient will be independent with education for symptom management and advanced HEP to decrease LBP pain.  -MO     LTG 1 Progress Ongoing  -MO     LTG 2 Patient will reduce level of percieved disability as measured by the Modified Oswestry  from 36% disability to </= 25% disability in order to improve quality of life.  -MO     LTG 2 Progress Ongoing  -MO     LTG 3 Pt will improve gross LE strength to 5/5 bilaterally  -MO     LTG 3 Progress Ongoing  -MO     LTG 4 Pt will demonstrate appropriate body mechanics for bending/lifting tasks to improve tolerance to ADLs.  -MO     LTG 4 Progress Ongoing  -MO     LTG 5 Patient will improve ability to sleep through the night with </= 2 back related sleep disturbances for improved QOL.  -MO     LTG 5 Progress Ongoing  -MO     LTG 6 Pt will report </= 1 instance of back locking up in 5 week time period  -MO     LTG 6 Progress Ongoing  -MO               User Key  (r) = Recorded By, (t) = Taken By, (c) = Cosigned By      Initials Name Provider Type    Beryl Aranda, PT Physical Therapist                    Therapy Education  Education Details: Educated pt on strength training guidelines with need to complete exercises 2-3x's per week, 2-3 sets at 8-12 reps. Discussed several ways to progress and modify each exercise as needed.  Given: HEP  Program: New, Reinforced  How Provided: Verbal, Written  Provided to:  Patient  Level of Understanding: Verbalized              Time Calculation:   Start Time: 0923  Stop Time: 0933  Time Calculation (min): 10 min  Timed Charges  54521 - PT Therapeutic Activity Minutes: 10  Total Minutes  Timed Charges Total Minutes: 10   Total Minutes: 10  Therapy Charges for Today       Code Description Service Date Service Provider Modifiers Qty    58271591375  PT THERAPEUTIC ACT EA 15 MIN 4/1/2024 Beryl Barlow, PT GP 1                  OP PT Discharge Summary  Date of Discharge: 04/01/24  Reason for Discharge: Lack of progress, Patient/Caregiver request      Beryl Barlow, PT  4/1/2024

## 2024-04-03 DIAGNOSIS — M10.9 GOUT OF MULTIPLE SITES, UNSPECIFIED CAUSE, UNSPECIFIED CHRONICITY: ICD-10-CM

## 2024-04-03 RX ORDER — ALLOPURINOL 100 MG/1
100 TABLET ORAL DAILY
Qty: 90 TABLET | Refills: 1 | Status: SHIPPED | OUTPATIENT
Start: 2024-04-03

## 2024-04-03 NOTE — TELEPHONE ENCOUNTER
Caller: Serafin Hartmann    Relationship: Self    Best call back number: [phone]     Requested Prescriptions:   Requested Prescriptions     Pending Prescriptions Disp Refills    allopurinol (ZYLOPRIM) 100 MG tablet 30 tablet 3     Sig: Take 1 tablet by mouth Daily.        Pharm[facility] where request [facility] be sent: [id number] [address] AT [facility] [address]  [place] - [phone] [facility] [phone] FX     Last office visit with prescribing clinician: 11/13/2023   Last telemedicine visit with prescribing clinician: Visit date not found   Next office visit with prescribing clinician: 5/14/2024     Additional details provided by patient: OUT OF MEDICATION FOR 3 WEEKS    PHARMACY CLAIMS THEY HAVE BEEN CONTACTING [employer] TEAM WITH NO RESPONSE.    Does the patient have less than a 3 day supply:  [x] Yes  [] No    Vivian Hernandez Rep   04/03/24 11:19 EDT

## 2024-04-30 ENCOUNTER — HOSPITAL ENCOUNTER (OUTPATIENT)
Facility: SURGERY CENTER | Age: 59
Setting detail: HOSPITAL OUTPATIENT SURGERY
Discharge: HOME | End: 2024-04-30
Attending: ANESTHESIOLOGY | Admitting: ANESTHESIOLOGY
Payer: MEDICAID

## 2024-04-30 ENCOUNTER — HOSPITAL ENCOUNTER (OUTPATIENT)
Dept: GENERAL RADIOLOGY | Facility: SURGERY CENTER | Age: 59
Setting detail: HOSPITAL OUTPATIENT SURGERY
End: 2024-04-30
Payer: MEDICAID

## 2024-04-30 VITALS
HEIGHT: 73 IN | DIASTOLIC BLOOD PRESSURE: 85 MMHG | OXYGEN SATURATION: 99 % | WEIGHT: 175 LBS | TEMPERATURE: 98.3 F | BODY MASS INDEX: 23.19 KG/M2 | RESPIRATION RATE: 16 BRPM | HEART RATE: 46 BPM | SYSTOLIC BLOOD PRESSURE: 153 MMHG

## 2024-04-30 DIAGNOSIS — Z41.9 SURGERY, ELECTIVE: ICD-10-CM

## 2024-04-30 DIAGNOSIS — M47.817 LUMBOSACRAL SPONDYLOSIS WITHOUT MYELOPATHY: ICD-10-CM

## 2024-04-30 PROCEDURE — 64493 INJ PARAVERT F JNT L/S 1 LEV: CPT | Performed by: ANESTHESIOLOGY

## 2024-04-30 PROCEDURE — 64494 INJ PARAVERT F JNT L/S 2 LEV: CPT | Performed by: ANESTHESIOLOGY

## 2024-04-30 PROCEDURE — 76000 FLUOROSCOPY <1 HR PHYS/QHP: CPT

## 2024-04-30 PROCEDURE — S0260 H&P FOR SURGERY: HCPCS | Performed by: ANESTHESIOLOGY

## 2024-04-30 PROCEDURE — 25510000001 IOPAMIDOL 61 % SOLUTION 30 ML VIAL: Performed by: ANESTHESIOLOGY

## 2024-04-30 PROCEDURE — 77002 NEEDLE LOCALIZATION BY XRAY: CPT

## 2024-04-30 PROCEDURE — 25010000002 BUPIVACAINE (PF) 0.5 % SOLUTION 10 ML VIAL: Performed by: ANESTHESIOLOGY

## 2024-04-30 NOTE — H&P
"Brief Pre-procedural / Pre-operative H&P        -----    Pertinent Diagnosis:   Lumbar spondylosis.  Lumbar facet arthropathy    Proposed Procedure: Lumbar medial branch block      Subjective   Serafin Hartmann is a 58 y.o. male  who presents for intervention.  He has a history of back pain.      History of Present Illness     He is denying any significant left-sided back pain.  He shows me that the pain is \"right here \"pointing to all on the right side.    Sharp and locking up pain and pain could wake him up when he changes positions especially when turning over in bed at night.  Aching nonradiating pain getting worse.  No relief with physical therapy.    Moderate facet arthropathy at L5-S1 as well as L4-5, as well as L3-4.  -------    The following portions of the patient's history were reviewed and updated as appropriate: allergies, current medications, past family history, past medical history, past social history, past surgical history and problem list.    Allergies   Allergen Reactions    Oxycodone-Acetaminophen Nausea And Vomiting and GI Intolerance       No current facility-administered medications for this encounter.    No current facility-administered medications on file prior to encounter.     Current Outpatient Medications on File Prior to Encounter   Medication Sig Dispense Refill    GLUCOSAMINE-CHONDROITIN PO Take  by mouth.      ibuprofen (ADVIL,MOTRIN) 400 MG tablet Take 1 tablet by mouth Every 6 (Six) Hours As Needed.      Multiple Vitamins-Minerals (MULTIVITAMIN ADULT PO) Take 1 tablet by mouth Daily.      Probiotic Product (PROBIOTIC DAILY PO) Take  by mouth.      clobetasol (TEMOVATE) 0.05 % external solution       ketoconazole (NIZORAL) 2 % shampoo       triamcinolone (KENALOG) 0.1 % ointment       valACYclovir (VALTREX) 500 MG tablet Take 1 tablet by mouth 2 (Two) Times a Day. 30 tablet 0       Patient Active Problem List   Diagnosis    Primary osteoarthritis of left hip    Status post left hip " replacement    Routine adult health maintenance    Colon polyps    Psoriasis of scalp    Ganglion of right wrist    Idiopathic chronic gout of multiple sites with tophus    Atypical mole    Primary osteoarthritis involving multiple joints    DDD (degenerative disc disease), lumbar    Lumbosacral spondylosis without myelopathy       Past Medical History:   Diagnosis Date    Allergic     GERD (gastroesophageal reflux disease)     Prostatitis        Past Surgical History:   Procedure Laterality Date    BASAL CELL CARCINOMA EXCISION  03/2023    carcinoma basal cell removal off his nose    COLONOSCOPY N/A 10/13/2020    Procedure: COLONOSCOPY INTO CECUM WITH POLYPECTOMY X 3, CLIP X 1;  Surgeon: Carlito Mcdaniels MD;  Location: [facility] ENDOSCOPY;  Service: General;  Laterality: N/A;  pre: screening  post: colon polyps x 3    CYST REMOVAL Right 11/2023    eyebrow    HAND SURGERY Right     HIP SURGERY      KNEE SURGERY Right        Family History   Problem Relation Age of Onset    Alzheimer's disease Father     Colon polyps Sister     Heart attack Paternal Uncle     Prostate cancer Neg Hx     Colon cancer Neg Hx     Malig Hyperthermia Neg Hx        Social History     Socioeconomic History    Marital status: Single    Number of children: 1   Tobacco Use    Smoking status: Never     Passive exposure: Never    Smokeless tobacco: Never    Tobacco comments:     daily caffine   Vaping Use    Vaping status: Never Used   Substance and Sexual Activity    Alcohol use: Yes     Comment: OCCASIONALLY     Drug use: Never    Sexual activity: Yes     Partners: Female       -------       Review of Systems  No Fever, No Chills, No ear pain, No sinus pressure or drainage, No eye pain or drainage, No cough, No SOB, No chest tightness nor chest pain, no palpitations.          Vitals:    04/29/24 1129 04/30/24 0932   BP:  148/90   BP Location:  Left arm   Patient Position:  Sitting   Pulse:  (!) 47  Comment: pt states heart rate is low  "  Resp:  16   Temp:  97.7 °F (36.5 °C)   TempSrc:  Temporal   SpO2:  98%   Weight: 79.4 kg (175 lb) 79.4 kg (175 lb)   Height: 185.4 cm (73\") 185.4 cm (73\")         Objective   Physical Exam  VSS, NNR, NCAT, NMNA, NRD, AAOx3.  Exam under fluoro to follow    -------    Assessment & Plan:  - as noted above, the stated intervention is indicated  - Follow-up plan will be noted in the operative report        Ov 1 wk      EMR Dragon/Transcription disclaimer:   Typed items in this encounter note may have been created by electronic transcription/translation software which converts spoken language to printed text. The electronic translation of spoken language may permit erroneous, or at times, nonsensical words or phrases to be inadvertently transcribed; Although I have reviewed the note for such errors, some may still exist.      "

## 2024-04-30 NOTE — OP NOTE
RIGHT L2-5 Lumbar Medial Branch Blockade  [facility]    PREOPERATIVE DIAGNOSIS:  Lumbar spondylosis without myelopathy    POSTOPERATIVE DIAGNOSIS:  Lumbar spondylosis without myelopathy    PROCEDURE:   Diagnostic Right Lumbar Medial Branch Nerve Blockades, with fluoroscopy:  L2, L3, L4, and L5 nerves (at the L3, L4, L5 transverse processes and the sacral alar groove) to block facet joints L3-4, L4-5, and L5-S1  88767 -- Lumbar Facet block, 1st Level  55817 -- Lumbar Facet block, 2nd  Level  96906 -- Lumbar Facet block, 3rd Level    PRE-PROCEDURE DISCUSSION WITH PATIENT:    Risks and complications were discussed with the patient prior to starting the procedure and informed consent was obtained.      SURGEON:   Elier Matute MD    REASON FOR PROCEDURE:    The patient complains of pain that seems to have a significant axial component, Tenderness of the affected facet joints on exam under fluoroscopy, Painful area identified on exam under fluoroscopy, and with pain on palpation its equal at L3-4 and L4-5 and L5-S1 facets, the medial branches to treat the L3-4 facets and the L5-S1 1 facets is necessary    SEDATION:  Patient declined administration of moderate sedation    ANESTHETIC:  Marcaine 0.5%  STEROID:  NONE  TOTAL VOLUME OF SOLUTION:  4 mL    DESCRIPTON OF PROCEDURE:  After obtaining informed consent, IV access was not obtained in the preoperative area.   The patient was taken to the operating room.  The patient was placed in the prone position with a pillow under the abdomen. All pressure points were well padded.  EKG, blood pressure, and pulse oximeter were monitored.  The patient was monitored and sedated by the RN under my direction. The lumbosacral area was prepped with Chloraprep and draped in a sterile fashion. Under fluoroscopic guidance the transverse processes of the L3, L4, and L5 vertebrae at the junctions of the superior articular processes were identified on the affected side.   Also identified was the groove between the ala and the superior articular process of the sacrum on the ipsilateral side.  Skin and subcutaneous tissue were anesthetized with 1% lidocaine above each of these points. A spinal needle was introduced under fluoroscopic guidance at the above junctions. Aspiration was negative for blood and CSF.  After confirming the position of the needle with fluoroscope in all views, 0.25 mL of Omnipaque was injected, and after seeing the proper spread a total of 1 mL of the anesthetic solution noted above was injected at each of these points.  Needles were removed intact from each of the areas.   Onset of analgesia was noted.  Vital signs remained stable throughout.      ESTIMATED BLOOD LOSS:  <5 mL  SPECIMENS:  none    COMPLICATIONS:   No complications were noted., There was no indication of vascular uptake on live injection of contrast dye., and The patient did not have any signs of postprocedure numbness nor weakness.    TOLERANCE & DISCHARGE CONDITION:    The patient tolerated the procedure well.  The patient was transported to the recovery area without difficulties.  The patient was discharged to home under the care of family in stable and satisfactory condition.    PLAN OF CARE:  The patient was given our standard instruction sheet.  We discussed that Lumbar Medial Branch Blockade is a diagnostic procedure in consideration for radiofrequency ablation if two diagnostic procedures prove to be positive for significant benefit.  If sustained relief of 6 to eight weeks is obtained, then an alternative plan could be therapeutic lumbar branch blockades.  The patient is asked to keep a pain log each hour for 8 hours after the procedure today.  The patient will  Return to clinic 1 wk  The patient will resume all medications as per the medication reconciliation sheet.

## 2024-04-30 NOTE — DISCHARGE INSTRUCTIONS
[facility] - Post-procedure Instructions          --  While there are no absolute restrictions, it is recommended that you do not perform strenuous activity today. In the morning, you may resume your level of activity as before your block.    --  If you have a band-aid at your injection site, please remove it later today. Observe the area for any redness, swelling, pus-like drainage, or a temperature over 101°. If any of these symptoms occur, please call your doctor at [phone]. If after office hours, leave a message and the on-call provider will return your call.    --  Ice may be applied to your injection site. It is recommended you avoid direct heat (heating pad; hot tub) for 1-2 days.    --  Call [facility] at [phone] if you experience persistent headache, persistent bleeding from the injection site, or severe pain not relieved by heat or oral medication.    --  Do not make important decisions today.    --  Due to the effects of the block and/or the I.V. Sedation, DO NOT drive or operate hazardous machinery for 12 hours.  Local anesthetics may cause numbness after procedure and precautions must be taken with regards to operating equipment as well as with walking, even if ambulating with assistance of another person or with an assistive device.    --  Do not drink alcohol for 12 hours.    -- You may return to work tomorrow, or as directed by your referring doctor.    --  Occasionally you may notice a slight increase in your pain after the procedure. This should start to improve within the next 24-48 hours. Radiofrequency ablation procedure pain may last 3-4 weeks.    --  It may take as long as 3-4 days before you notice a gradual improvement in your pain and/or other symptoms.    -- You may continue to take your prescribed pain medication as needed.    --  Some normal possible side effects of steroid use could include fluid retention, increased blood sugar, dull headache,  increased sweating, increased appetite, mood swings and flushing.    --  Diabetics are recommended to watch their blood glucose level closely for 24-48 hours after the injection.    --  Must stay in PACU for 20 min upon arrival and prove no leg weakness before being discharged.    --  IN THE EVENT OF A LIFE THREATENING EMERGENCY, (CHEST PAIN, BREATHING DIFFICULTIES, PARALYSIS…) YOU SHOULD GO TO YOUR NEAREST EMERGENCY ROOM.    --  You should be contacted by our office within 2-3 days to schedule follow up or next appointment date.  If not contacted within 7 days, please call the office at [phone]

## 2024-05-07 ENCOUNTER — PREP FOR SURGERY (OUTPATIENT)
Dept: SURGERY | Facility: SURGERY CENTER | Age: 59
End: 2024-05-07
Payer: MEDICAID

## 2024-05-07 ENCOUNTER — OFFICE VISIT (OUTPATIENT)
Dept: PAIN MEDICINE | Facility: CLINIC | Age: 59
End: 2024-05-07
Payer: MEDICAID

## 2024-05-07 VITALS
HEIGHT: 73 IN | SYSTOLIC BLOOD PRESSURE: 138 MMHG | BODY MASS INDEX: 24.2 KG/M2 | OXYGEN SATURATION: 97 % | WEIGHT: 182.6 LBS | DIASTOLIC BLOOD PRESSURE: 80 MMHG | HEART RATE: 60 BPM | TEMPERATURE: 96.9 F

## 2024-05-07 DIAGNOSIS — M47.817 LUMBOSACRAL SPONDYLOSIS WITHOUT MYELOPATHY: Primary | ICD-10-CM

## 2024-05-07 DIAGNOSIS — M51.36 DDD (DEGENERATIVE DISC DISEASE), LUMBAR: ICD-10-CM

## 2024-05-07 PROCEDURE — 1125F AMNT PAIN NOTED PAIN PRSNT: CPT | Performed by: PHYSICIAN ASSISTANT

## 2024-05-07 PROCEDURE — 1159F MED LIST DOCD IN RCRD: CPT | Performed by: PHYSICIAN ASSISTANT

## 2024-05-07 PROCEDURE — 1160F RVW MEDS BY RX/DR IN RCRD: CPT | Performed by: PHYSICIAN ASSISTANT

## 2024-05-07 PROCEDURE — 99214 OFFICE O/P EST MOD 30 MIN: CPT | Performed by: PHYSICIAN ASSISTANT

## 2024-05-07 NOTE — H&P (VIEW-ONLY)
CHIEF COMPLAINT  Back pain      Subjective   Serafin Hartmann is a 58 y.o. male  who presents to the office for follow-up of procedure.  He completed a LUMBAR MEDIAL BRANCH BLOCK RIGHT L2-L5#1 on  4/30/24 performed by Dr. Matute for management of back pain. Patient reports 80% relief from the procedure x 6-8 hours.  He states that this was the first time in 20 years that he was able to state that he had such a reduction of back pain.  Pain is present when sitting and is especially noted when sleeping.  He states when he wakes up first thing in the morning the pain is in a bandlike distribution across the lumbosacral spine but primarily throughout the day it is only affecting the right lumbar region.    Pain today 2/10 VAS in severity.        Back Pain  This is a chronic problem. The current episode started more than 1 year ago. The problem occurs constantly. The problem has been worse since onset. The pain is present in the lumbar spine. The quality of the pain is described as aching. The pain does not radiate. The pain is at a severity of 2/10. The pain is mild. The pain is Worse during the night. The symptoms are aggravated by sitting (turning over in the bed). Pertinent negatives include no abdominal pain, chest pain, dysuria, fever, headaches, numbness or weakness. He has tried home exercises for the symptoms. The treatment provided mild relief.        PEG Assessment   What number best describes your pain on average in the past week?3  What number best describes how, during the past week, pain has interfered with your enjoyment of life?3  What number best describes how, during the past week, pain has interfered with your general activity?  3    Review of Pertinent Medical Data ---  No new imaging to review    The following portions of the patient's history were reviewed and updated as appropriate: allergies, current medications, past family history, past medical history, past social history, past surgical history,  "and problem list.    Review of Systems   Constitutional:  Negative for activity change, fatigue and fever.   Cardiovascular:  Negative for chest pain.   Gastrointestinal:  Negative for abdominal pain, constipation and diarrhea.   Genitourinary:  Negative for difficulty urinating and dysuria.   Musculoskeletal:  Positive for back pain.   Neurological:  Negative for dizziness, weakness, light-headedness, numbness and headaches.   Psychiatric/Behavioral:  Positive for sleep disturbance. Negative for agitation and suicidal ideas. The patient is not nervous/anxious.      I have reviewed and confirmed the accuracy of the ROS as documented by the MA/LPN/RN CHUYITA Gallego   Vitals:    05/07/24 0926   BP: 138/80   BP Location: Left arm   Patient Position: Sitting   Cuff Size: Adult   Pulse: 60   Temp: 96.9 °F (36.1 °C)   SpO2: 97%   Weight: 82.8 kg (182 lb 9.6 oz)   Height: 185.4 cm (73\")   PainSc:   2         Objective   Physical Exam  Vitals and nursing note reviewed.   Constitutional:       Appearance: Normal appearance. He is normal weight.   HENT:      Head: Normocephalic.   Pulmonary:      Effort: Pulmonary effort is normal.   Musculoskeletal:      Lumbar back: Tenderness (mild pain to palpation over the right lumbar paraspinal muscles/facet joint spaces) present. Decreased range of motion (pain increased with lumbar extension).        Back:    Skin:     General: Skin is warm and dry.   Neurological:      General: No focal deficit present.      Mental Status: He is alert and oriented to person, place, and time.      Cranial Nerves: Cranial nerves 2-12 are intact.      Sensory: Sensation is intact.      Motor: Motor function is intact.      Gait: Gait is intact.   Psychiatric:         Mood and Affect: Mood normal.         Behavior: Behavior normal.         Thought Content: Thought content normal.         Judgment: Judgment normal.       Assessment & Plan   Diagnoses and all orders for this visit:    1. Lumbosacral " spondylosis without myelopathy (Primary)    2. DDD (degenerative disc disease), lumbar        Serafin Hartmann reports a pain score of 2.  Given his pain assessment as noted, treatment options were discussed and the following options were decided upon as a follow-up plan to address the patient's pain: continuation of current treatment plan for pain and use of non-medical modalities (ice, heat, stretching and/or behavior modifications).      --- Follow-up as scheduled for #2 confirmatory injection.  --- Will obtain 2 weeks for further evaluation and treat recommendations to be made        Diagnostic Facet Joint Procedure:   RAFFAELE     The confirmatory diagnostic facet joint procedure is considered medically reasonable and necessary for the diagnosis and treatment of chronic pain for this patient due to the patient meeting all of the following criteria:    - 1. Moderate to severe chronic neck or low back pain, predominantly axial, that causes functional deficit measured on pain or disability scale.  - 2. Pain present for minimum of 3 months with documented failure to respond to noninvasive conservative management (as tolerated)  - 3. Absence of untreated radiculopathy or neurogenic claudication (except for radiculopathy caused by facet joint synovial cyst)  - 4. There is no non-facet pathology per clinical assessment or radiology studies that could explain the source of the patient’s pain, including but not limited to fracture, tumor, infection, or significant deformity.    The patient has also shown a consistent positive response of at least 80% relief of primary (index) pain (with the duration of relief being consistent with the agent used).                Dictated utilizing Dragon dictation.

## 2024-05-09 ENCOUNTER — HOSPITAL ENCOUNTER (OUTPATIENT)
Facility: SURGERY CENTER | Age: 59
Setting detail: HOSPITAL OUTPATIENT SURGERY
End: 2024-05-09
Attending: ANESTHESIOLOGY | Admitting: ANESTHESIOLOGY
Payer: MEDICAID

## 2024-05-09 ENCOUNTER — TRANSCRIBE ORDERS (OUTPATIENT)
Dept: SURGERY | Facility: SURGERY CENTER | Age: 59
End: 2024-05-09
Payer: MEDICAID

## 2024-05-09 DIAGNOSIS — Z41.9 SURGERY, ELECTIVE: Primary | ICD-10-CM

## 2024-05-14 ENCOUNTER — OFFICE VISIT (OUTPATIENT)
Dept: INTERNAL MEDICINE | Age: 59
End: 2024-05-14
Payer: MEDICAID

## 2024-05-14 VITALS
RESPIRATION RATE: 16 BRPM | TEMPERATURE: 96.7 F | HEART RATE: 68 BPM | WEIGHT: 182.6 LBS | BODY MASS INDEX: 24.2 KG/M2 | HEIGHT: 73 IN | SYSTOLIC BLOOD PRESSURE: 138 MMHG | DIASTOLIC BLOOD PRESSURE: 62 MMHG | OXYGEN SATURATION: 96 %

## 2024-05-14 DIAGNOSIS — Z12.5 PROSTATE CANCER SCREENING: ICD-10-CM

## 2024-05-14 DIAGNOSIS — N52.9 ERECTILE DYSFUNCTION, UNSPECIFIED ERECTILE DYSFUNCTION TYPE: ICD-10-CM

## 2024-05-14 DIAGNOSIS — Z23 NEED FOR DIPHTHERIA-TETANUS-PERTUSSIS (TDAP) VACCINE: ICD-10-CM

## 2024-05-14 DIAGNOSIS — Z00.00 ANNUAL PHYSICAL EXAM: Primary | ICD-10-CM

## 2024-05-14 DIAGNOSIS — Z86.19 HISTORY OF HERPES GENITALIS: ICD-10-CM

## 2024-05-14 DIAGNOSIS — M10.9 GOUT OF MULTIPLE SITES, UNSPECIFIED CAUSE, UNSPECIFIED CHRONICITY: ICD-10-CM

## 2024-05-14 PROCEDURE — 1125F AMNT PAIN NOTED PAIN PRSNT: CPT

## 2024-05-14 PROCEDURE — 90715 TDAP VACCINE 7 YRS/> IM: CPT

## 2024-05-14 PROCEDURE — 90471 IMMUNIZATION ADMIN: CPT

## 2024-05-14 PROCEDURE — 1160F RVW MEDS BY RX/DR IN RCRD: CPT

## 2024-05-14 PROCEDURE — 1159F MED LIST DOCD IN RCRD: CPT

## 2024-05-14 PROCEDURE — 99396 PREV VISIT EST AGE 40-64: CPT

## 2024-05-14 RX ORDER — SILDENAFIL CITRATE 20 MG/1
TABLET ORAL
Qty: 90 TABLET | Refills: 0 | Status: SHIPPED | OUTPATIENT
Start: 2024-05-14

## 2024-05-14 RX ORDER — ALLOPURINOL 100 MG/1
100 TABLET ORAL DAILY
Qty: 90 TABLET | Refills: 1 | Status: SHIPPED | OUTPATIENT
Start: 2024-05-14

## 2024-05-14 RX ORDER — VALACYCLOVIR HYDROCHLORIDE 500 MG/1
500 TABLET, FILM COATED ORAL 2 TIMES DAILY
Qty: 30 TABLET | Refills: 0 | Status: SHIPPED | OUTPATIENT
Start: 2024-05-14

## 2024-05-14 NOTE — LETTER
[facility]  Vaccine Consent Form    Patient Name:  Serafin Hartmann  Patient :  1965        Screening Checklist  The following questions should be completed prior to vaccination. If you answer “yes” to any question, it does not necessarily mean you should not be vaccinated. It just means we may need to clarify or ask more questions. If a question is unclear, please ask your healthcare provider to explain it.    Yes No   Any fever or moderate to severe illness today (mild illness and/or antibiotic treatment are not contraindications)?     Do you have a history of a serious reaction to any previous vaccinations, such as anaphylaxis, encephalopathy within 7 days, Guillain-[place] syndrome within 6 weeks, seizure?     Have you received any live vaccine(s) (e.g MMR, KERWIN) or any other vaccines in the last month (to ensure duplicate doses aren't given)?     Do you have an anaphylactic allergy to latex (DTaP, DTaP-IPV, Hep A, Hep B, MenB, RV, Td, Tdap), baker’s yeast (Hep B, HPV), polysorbates (RSV, nirsevimab, PCV 20, Rotavirrus, Tdap, Shingrix), or gelatin (KERWIN, MMR)?     Do you have an anaphylactic allergy to neomycin (Rabies, KERWIN, MMR, IPV, Hep A), polymyxin B (IPV), or streptomycin (IPV)?      Any cancer, leukemia, AIDS, or other immune system disorder? (KERWIN, MMR, RV)     Do you have a parent, brother, or sister with an immune system problem (if immune competence of vaccine recipient clinically verified, can proceed)? (MMR, KERWIN)     Any recent steroid treatments for >2 weeks, chemotherapy, or radiation treatment? (KERWIN, MMR)     Have you received antibody-containing blood transfusions or IVIG in the past 11 months (recommended interval is dependent on product)? (MMR, KERWIN)     Have you taken antiviral drugs (acyclovir, famciclovir, valacyclovir for KERWIN) in the last 24 or 48 hours, respectively?      Are you pregnant or planning to become pregnant within 1 month? (KERWIN, MMR, HPV, IPV, MenB, Abrexvy; For Hep B-  "refer to Engerix-B; For RSV - Abrysvo is indicated for 32-36 weeks of pregnancy from September to January)     For infants, have you ever been told your child has had intussusception or a medical emergency involving obstruction of the intestine (Rotavirus)? If not for an infant, can skip this question.         *Ordering Physicians/APC should be consulted if \"yes\" is checked by the patient or guardian above.  I have received, read, and understand the Vaccine Information Statement (VIS) for each vaccine ordered.  I have considered my or my child's health status as well as the health status of my close contacts.  I have taken the opportunity to discuss my vaccine questions with my or my child's health care provider.   I have requested that the ordered vaccine(s) be given to me or my child.  I understand the benefits and risks of the vaccines.  I understand that I should remain in the clinic for 15 minutes after receiving the vaccine(s).  _________________________________________________________  Signature of Patient or Parent/Legal Guardian ____________________  Date   "

## 2024-05-14 NOTE — PROGRESS NOTES
I N T E R N A L  M E D I C I N E  HALEIGH Nolasco      ENCOUNTER DATE:  05/14/2024    Serafin Mary Kate / 58 y.o. / male    CHIEF COMPLAINT     Annual Exam and Med Refill (Discuss meds viagra refill )    BP is elevated at today's visit, 138/62.  Mostly BP averages 110/70 outside of office.  He attributes elevated BP readings to bradycardia.  He has never had formal evaluation for this and declines further testing, cardiology referral at this time.  Remains asymptomatic.  Previously evaluated by cardiology, Dr. Kingston, in October 2020 for atypical chest discomfort.  November 2020 Stress Test was normal.  ECHO showed normal EF of 61-65%, mild mitral valve regurgitation.      Seen by ortho, HALEIGH Alva on January 2, 2024 for lumbar radiculopathy.  MRI Lumbar MRI showed mild to moderate arthritis in the facet joints in the back of the spine at nearly every level.  Underwent Lumbar medial branch block with Dr. Matute on April 30, 2024 with fleeting benefit.  Plans to undergo ablation on June 27, 2024.  Goal is to avoid surgery.     Gout: Symptoms well controlled on allopurinol 100 mg daily. Prior gout symptoms occurred in toe and ankle.  November 2023 Uric Acid 4.7.  Requesting that we update uric acid value again at today's visit.       Psoriasis/ basal cell history: Followed by dermatology, Khushboo Mondragon MD.  Using clobetasol external solution without current benefit for scalp, triamcinolone ointment, ketoconazole ointment PRN.       HLD: November 2023 Lipid panel with normal LDL 96; normal triglycerides 67.     Valacyclovir 500 mg BID PRN for rare occurrence of genital herpes fairs.       Colonoscopy completed October 13, 2020 with Dr. Mcdaniels.  Next due in 5 years.       Last PSA in September 2020 was 4.630.  Followed by [facility] for yearly PSA monitoring but is requesting that we update PSA at today's visit. On sildenafil for ED.  Requesting that our office take over this RX.      VITALS     Visit  "Vitals  /62   Pulse 68   Temp 96.7 °F (35.9 °C) (Temporal)   Resp 16   Ht 185.4 cm (72.99\")   Wt 82.8 kg (182 lb 9.6 oz)   SpO2 96%   BMI 24.10 kg/m²       BP Readings from Last 3 Encounters:   24 138/62   24 138/80   24 153/85     Wt Readings from Last 3 Encounters:   24 82.8 kg (182 lb 9.6 oz)   24 82.8 kg (182 lb 9.6 oz)   24 79.4 kg (175 lb)      Body mass index is 24.1 kg/m².      MEDICATIONS     Current Outpatient Medications on File Prior to Visit   Medication Sig Dispense Refill    clobetasol (TEMOVATE) 0.05 % external solution       GLUCOSAMINE-CHONDROITIN PO Take  by mouth.      ibuprofen (ADVIL,MOTRIN) 400 MG tablet Take 1 tablet by mouth Every 6 (Six) Hours As Needed.      ketoconazole (NIZORAL) 2 % shampoo       Probiotic Product (PROBIOTIC DAILY PO) Take  by mouth.      triamcinolone (KENALOG) 0.1 % ointment       [DISCONTINUED] allopurinol (ZYLOPRIM) 100 MG tablet Take 1 tablet by mouth Daily. 90 tablet 1    [DISCONTINUED] valACYclovir (VALTREX) 500 MG tablet Take 1 tablet by mouth 2 (Two) Times a Day. 30 tablet 0    Multiple Vitamins-Minerals (MULTIVITAMIN ADULT PO) Take 1 tablet by mouth Daily. (Patient not taking: Reported on 2024)       No current facility-administered medications on file prior to visit.         HISTORY OF PRESENT ILLNESS      Serafin presents for annual health maintenance visit.    General health: good  Lifestyle:  Attempting to lose weight?: No   Diet: eats a well balanced, healthy diet  Exercise: exercises 5+ days weekly  Tobacco: Never used   Alcohol: 2 days/week and 4 drinks/occasion  Work: Full-time  Reproductive health:  Sexually active?: Yes   Concern for STD?: No   Sexual problems?: No problems   Sees Urologist?: Yes for monitoring of PSA  Depression Screenin/13/2023     7:48 AM   PHQ-2/PHQ-9 Depression Screening   Little Interest or Pleasure in Doing Things 0-->not at all   Feeling Down, Depressed or Hopeless " 0-->not at all   PHQ-9: Brief Depression Severity Measure Score 0         PHQ-2: 0 (Not depressed)     PHQ-9: 0 (Negative screening for depression)    Patient Care Team:  Mae Carter APRN as PCP - General (Family Medicine)  ______________________________________________________________________    ALLERGIES  Allergies   Allergen Reactions    Oxycodone-Acetaminophen Nausea And Vomiting and GI Intolerance        PFSH:     The following portions of the patient's history were reviewed and updated as appropriate: Allergies / Current Medications / Past Medical History / Surgical History / Social History / Family History    PROBLEM LIST   Patient Active Problem List   Diagnosis    Primary osteoarthritis of left hip    Status post left hip replacement    Routine adult health maintenance    Colon polyps    Psoriasis of scalp    Ganglion of right wrist    Idiopathic chronic gout of multiple sites with tophus    Atypical mole    Primary osteoarthritis involving multiple joints    DDD (degenerative disc disease), lumbar    Lumbosacral spondylosis without myelopathy       PAST MEDICAL HISTORY  Past Medical History:   Diagnosis Date    Allergic     GERD (gastroesophageal reflux disease)     Prostatitis        SURGICAL HISTORY  Past Surgical History:   Procedure Laterality Date    BASAL CELL CARCINOMA EXCISION  03/2023    carcinoma basal cell removal off his nose    COLONOSCOPY N/A 10/13/2020    Procedure: COLONOSCOPY INTO CECUM WITH POLYPECTOMY X 3, CLIP X 1;  Surgeon: Carlito Mcdaniels MD;  Location: [facility] ENDOSCOPY;  Service: General;  Laterality: N/A;  pre: screening  post: colon polyps x 3    CYST REMOVAL Right 11/2023    eyebrow    HAND SURGERY Right     HIP SURGERY      KNEE SURGERY Right     MEDIAL BRANCH BLOCK Right 4/30/2024    Procedure: LUMBAR MEDIAL BRANCH BLOCK RIGHT L3-L5#1 52264, 36072;  Surgeon: Elier Matute MD;  Location: [facility] MAIN OR;  Service: Pain Management;  Laterality: Right;       SOCIAL  HISTORY  Social History     Socioeconomic History    Marital status: Single    Number of children: 1   Tobacco Use    Smoking status: Never     Passive exposure: Never    Smokeless tobacco: Never    Tobacco comments:     daily caffine   Vaping Use    Vaping status: Never Used   Substance and Sexual Activity    Alcohol use: Yes     Comment: OCCASIONALLY     Drug use: Never    Sexual activity: Yes     Partners: Female       FAMILY HISTORY  Family History   Problem Relation Age of Onset    Alzheimer's disease Father     Colon polyps Sister     Heart attack Paternal Uncle     Prostate cancer Neg Hx     Colon cancer Neg Hx     Malig Hyperthermia Neg Hx        IMMUNIZATION HISTORY  Immunization History   Administered Date(s) Administered    COVID-19 (PFIZER) BIVALENT 12+YRS 10/19/2022    COVID-19 (PFIZER) Purple Cap Monovalent 03/10/2021, 04/07/2021, 12/21/2021    Shingrix 11/01/2023, 04/09/2024         REVIEW OF SYSTEMS     Review of Systems   Constitutional:  Negative for chills, fever and unexpected weight change.   Respiratory:  Negative for cough, chest tightness and shortness of breath.    Cardiovascular:  Negative for chest pain, palpitations and leg swelling.   Skin:         +Scalp psoriasis   Neurological:  Negative for dizziness, weakness, light-headedness and headaches.   Psychiatric/Behavioral:  The patient is not nervous/anxious.        PHYSICAL EXAMINATION     Physical Exam  Vitals reviewed.   Constitutional:       General: He is not in acute distress.     Appearance: Normal appearance. He is not ill-appearing, toxic-appearing or diaphoretic.   HENT:      Head: Normocephalic and atraumatic.      Right Ear: Tympanic membrane, ear canal and external ear normal. There is no impacted cerumen.      Left Ear: Tympanic membrane, ear canal and external ear normal. There is no impacted cerumen.      Nose: Nose normal. No congestion or rhinorrhea.      Mouth/Throat:      Mouth: Mucous membranes are moist.       Pharynx: Oropharynx is clear. No oropharyngeal exudate or posterior oropharyngeal erythema.   Eyes:      Extraocular Movements: Extraocular movements intact.      Conjunctiva/sclera: Conjunctivae normal.      Pupils: Pupils are equal, round, and reactive to light.   Cardiovascular:      Rate and Rhythm: Normal rate and regular rhythm.      Heart sounds: Normal heart sounds.   Pulmonary:      Effort: Pulmonary effort is normal. No respiratory distress.      Breath sounds: Normal breath sounds.   Abdominal:      General: Bowel sounds are normal.      Palpations: Abdomen is soft.      Tenderness: There is no abdominal tenderness.   Musculoskeletal:         General: Normal range of motion.      Cervical back: Normal range of motion and neck supple.      Right lower leg: No edema.      Left lower leg: No edema.   Lymphadenopathy:      Cervical: No cervical adenopathy.   Skin:     General: Skin is warm and dry.      Findings: Rash (Dry scales of scalp above bilateral ears) present.   Neurological:      General: No focal deficit present.      Mental Status: He is alert and oriented to person, place, and time. Mental status is at baseline.   Psychiatric:         Mood and Affect: Mood normal.         Behavior: Behavior normal.         Thought Content: Thought content normal.         Judgment: Judgment normal.         REVIEWED DATA      Labs:    Lab Results   Component Value Date     11/13/2023    K 4.3 11/13/2023    CALCIUM 9.3 11/13/2023    AST 26 11/13/2023    ALT 22 11/13/2023    BUN 13 11/13/2023    CREATININE 0.80 11/13/2023    CREATININE 0.91 10/12/2022    CREATININE 0.87 08/25/2020    EGFRIFNONA 91 08/25/2020    EGFRIFAFRI 110 08/25/2020       Lab Results   Component Value Date    GLUCOSE 100 (H) 11/13/2023    HGBA1C 5.20 08/25/2020    TSH 1.600 08/25/2020       Lab Results   Component Value Date    PSA 4.630 (H) 09/09/2020    PSA 4.920 (H) 08/25/2020       [unfilled]    Lab Results   Component Value Date    LDL 96  "11/13/2023    HDL 56 11/13/2023    TRIG 67 11/13/2023    CHOLHDLRATIO 2.95 11/13/2023       No components found for: \"[id number]\"    Lab Results   Component Value Date    WBC 5.50 11/13/2023    HGB 14.6 11/13/2023    MCV 89.6 11/13/2023     11/13/2023       Lab Results   Component Value Date    PROTEIN Trace 08/25/2020    GLUCOSEU Negative 08/25/2020    BLOODU Negative 08/25/2020    NITRITEU Negative 08/25/2020    LEUKOCYTESUR Negative 08/25/2020          Lab Results   Component Value Date    HEPCVIRUSABY 0.1 08/25/2020       Imaging:           Medical Tests:           ASSESSMENT & PLAN     ANNUAL WELLNESS EXAM / PHYSICAL     Diagnoses and all orders for this visit:    1. Annual physical exam (Primary)  -     CBC & Differential  -     Comprehensive Metabolic Panel  -     Hemoglobin A1c  -     Lipid Panel With / Chol / HDL Ratio  -     TSH+Free T4  -     Urinalysis without microscopic (no culture) - Urine, Clean Catch  -     Testosterone, Free, Total    2. Gout of multiple sites, unspecified cause, unspecified chronicity  -     allopurinol (ZYLOPRIM) 100 MG tablet; Take 1 tablet by mouth Daily.  Dispense: 90 tablet; Refill: 1  -     Uric Acid    3. History of herpes genitalis  -     valACYclovir (VALTREX) 500 MG tablet; Take 1 tablet by mouth 2 (Two) Times a Day.  Dispense: 30 tablet; Refill: 0    4. Erectile dysfunction, unspecified erectile dysfunction type  -     sildenafil (REVATIO) 20 MG tablet; Take 1 to 5 tablets 30 to 60 minutes before sexual activity - start with lower dose and titrate higher as needed  Dispense: 90 tablet; Refill: 0    5. Prostate cancer screening  -     PSA Screen    6. Need for diphtheria-tetanus-pertussis (Tdap) vaccine  -     Tdap Vaccine => 8yo IM (BOOSTRIX)         Summary/Discussion:     Recommend that he monitor his BP at home to ensure it is averaging < 130/80 and let us know if BP is trending higher over time.  He declines further evaluation for reported history of labile BP " readings.  Recommend further evaluation with dermatology for treatment of poorly managed scalp psoriasis.      Next Appointment with me: Visit date not found    Return in about 1 year (around 5/14/2025) for Annual physical.      HEALTHCARE MAINTENANCE ISSUES       Cancer Screening:  Colon: Initial/Next screening at age: CURRENT  Repeat colon cancer screening: every 5 years  Prostate: Performed today and recommended annual screening  Testicular: Recommended monthly self exam  Skin: Monthly self skin examination, annual exam by health professional  Lung: Does not meet criteria for lung cancer screening.   Other:    Screening Labs & Tests:  Lab results reviewed & discussed with with patient or orders placed today.  EKG:  CV Screening: Lipid panel  DEXA (75+ or risk factors):   HEP C (If born [birth year]75-90[birth year] or risk factors): Previously had negative screen  Other:     Immunization/Vaccinations (to be given today unless deferred by patient)  Influenza: Recommended annual influenza vaccine  Hepatitis A: Verify immunization records  Hepatitis B: Verify immunization records  Tetanus/Pertussis: Will update today  Pneumovax/PCV: Not needed at this time  Shingles: Not needed at this time  COVID: Considering the latest booster   Lifestyle Counseling:  Lifestyle Modifications: Continue good lifestyle choices/modifications and Reduce exposure to stress if possible  Safety Issues: Always wear seatbelt, Avoid texting while driving   Use sunscreen, regular skin examination  Recommended annual dental/vision examination.  Emotional/Stress/Sleep: Reviewed and  given when appropriate      Health Maintenance   Topic Date Due    TDAP/TD VACCINES (1 - Tdap) Never done    COVID-19 Vaccine (5 - 2023-24 season) 08/03/2024 (Originally 9/1/2023)    RSV Vaccine - Adults (1 - 1-dose 60+ series) 05/14/2025 (Originally 10/17/2023)    INFLUENZA VACCINE  08/01/2024    LIPID PANEL  11/13/2024    ANNUAL PHYSICAL  05/14/2025    COLORECTAL CANCER  SCREENING  10/13/2025    HEPATITIS C SCREENING  Completed    ZOSTER VACCINE  Completed    Pneumococcal Vaccine 0-64  Aged Out

## 2024-05-16 LAB
ALBUMIN SERPL-MCNC: 4.2 G/DL (ref 3.5–5.2)
ALBUMIN/GLOB SERPL: 1.9 G/DL
ALP SERPL-CCNC: 61 U/L (ref 39–117)
ALT SERPL-CCNC: 14 U/L (ref 1–41)
APPEARANCE UR: CLEAR
AST SERPL-CCNC: 23 U/L (ref 1–40)
BASOPHILS # BLD AUTO: 0.05 10*3/MM3 (ref 0–0.2)
BASOPHILS NFR BLD AUTO: 1.1 % (ref 0–1.5)
BILIRUB SERPL-MCNC: 0.4 MG/DL (ref 0–1.2)
BILIRUB UR QL STRIP: NEGATIVE
BUN SERPL-MCNC: 16 MG/DL (ref 8–23)
BUN/CREAT SERPL: 20 (ref 7–25)
CALCIUM SERPL-MCNC: 9.1 MG/DL (ref 8.6–10.5)
CHLORIDE SERPL-SCNC: 105 MMOL/L (ref 98–107)
CHOLEST SERPL-MCNC: 169 MG/DL (ref 0–200)
CHOLEST/HDLC SERPL: 3.13 {RATIO}
CO2 SERPL-SCNC: 24.4 MMOL/L (ref 22–29)
COLOR UR: ABNORMAL
CREAT SERPL-MCNC: 0.8 MG/DL (ref 0.76–1.27)
EGFRCR SERPLBLD CKD-EPI 2021: 101.3 ML/MIN/1.73
EOSINOPHIL # BLD AUTO: 0.25 10*3/MM3 (ref 0–0.4)
EOSINOPHIL NFR BLD AUTO: 5.3 % (ref 0.3–6.2)
ERYTHROCYTE [DISTWIDTH] IN BLOOD BY AUTOMATED COUNT: 12.8 % (ref 12.3–15.4)
GLOBULIN SER CALC-MCNC: 2.2 GM/DL
GLUCOSE SERPL-MCNC: 91 MG/DL (ref 65–99)
GLUCOSE UR QL STRIP: NEGATIVE
HBA1C MFR BLD: 5.3 % (ref 4.8–5.6)
HCT VFR BLD AUTO: 40.2 % (ref 37.5–51)
HDLC SERPL-MCNC: 54 MG/DL (ref 40–60)
HGB BLD-MCNC: 13.8 G/DL (ref 13–17.7)
HGB UR QL STRIP: NEGATIVE
IMM GRANULOCYTES # BLD AUTO: 0.01 10*3/MM3 (ref 0–0.05)
IMM GRANULOCYTES NFR BLD AUTO: 0.2 % (ref 0–0.5)
KETONES UR QL STRIP: ABNORMAL
LDLC SERPL CALC-MCNC: 96 MG/DL (ref 0–100)
LEUKOCYTE ESTERASE UR QL STRIP: NEGATIVE
LYMPHOCYTES # BLD AUTO: 1.23 10*3/MM3 (ref 0.7–3.1)
LYMPHOCYTES NFR BLD AUTO: 26.1 % (ref 19.6–45.3)
MCH RBC QN AUTO: 30.7 PG (ref 26.6–33)
MCHC RBC AUTO-ENTMCNC: 34.3 G/DL (ref 31.5–35.7)
MCV RBC AUTO: 89.5 FL (ref 79–97)
MONOCYTES # BLD AUTO: 0.28 10*3/MM3 (ref 0.1–0.9)
MONOCYTES NFR BLD AUTO: 5.9 % (ref 5–12)
NEUTROPHILS # BLD AUTO: 2.9 10*3/MM3 (ref 1.7–7)
NEUTROPHILS NFR BLD AUTO: 61.4 % (ref 42.7–76)
NITRITE UR QL STRIP: NEGATIVE
NRBC BLD AUTO-RTO: 0 /100 WBC (ref 0–0.2)
PH UR STRIP: 5.5 [PH] (ref 5–8)
PLATELET # BLD AUTO: 204 10*3/MM3 (ref 140–450)
POTASSIUM SERPL-SCNC: 3.8 MMOL/L (ref 3.5–5.2)
PROT SERPL-MCNC: 6.4 G/DL (ref 6–8.5)
PROT UR QL STRIP: ABNORMAL
PSA SERPL-MCNC: 5.95 NG/ML (ref 0–4)
RBC # BLD AUTO: 4.49 10*6/MM3 (ref 4.14–5.8)
SODIUM SERPL-SCNC: 141 MMOL/L (ref 136–145)
SP GR UR STRIP: >1.03 (ref 1–1.03)
T4 FREE SERPL-MCNC: 1.25 NG/DL (ref 0.93–1.7)
TESTOST FREE SERPL-MCNC: 8.7 PG/ML (ref 6.6–18.1)
TESTOST SERPL-MCNC: 425 NG/DL (ref 264–916)
TRIGL SERPL-MCNC: 108 MG/DL (ref 0–150)
TSH SERPL DL<=0.005 MIU/L-ACNC: 1.4 UIU/ML (ref 0.27–4.2)
URATE SERPL-MCNC: 4.6 MG/DL (ref 3.4–7)
UROBILINOGEN UR STRIP-MCNC: ABNORMAL MG/DL
VLDLC SERPL CALC-MCNC: 19 MG/DL (ref 5–40)
WBC # BLD AUTO: 4.72 10*3/MM3 (ref 3.4–10.8)

## 2024-05-17 DIAGNOSIS — R80.9 PROTEINURIA, UNSPECIFIED TYPE: Primary | ICD-10-CM

## 2024-05-21 ENCOUNTER — APPOINTMENT (OUTPATIENT)
Dept: GENERAL RADIOLOGY | Facility: SURGERY CENTER | Age: 59
End: 2024-05-21
Payer: MEDICAID

## 2024-05-21 ENCOUNTER — TRANSCRIBE ORDERS (OUTPATIENT)
Dept: SURGERY | Facility: SURGERY CENTER | Age: 59
End: 2024-05-21
Payer: MEDICAID

## 2024-05-21 DIAGNOSIS — Z41.9 SURGERY, ELECTIVE: Primary | ICD-10-CM

## 2024-05-28 ENCOUNTER — APPOINTMENT (OUTPATIENT)
Dept: PAIN MEDICINE | Facility: HOSPITAL | Age: 59
End: 2024-05-28
Payer: MEDICAID

## 2024-05-28 ENCOUNTER — HOSPITAL ENCOUNTER (OUTPATIENT)
Dept: PAIN MEDICINE | Facility: HOSPITAL | Age: 59
Discharge: HOME | End: 2024-05-28
Payer: MEDICAID

## 2024-05-28 ENCOUNTER — HOSPITAL ENCOUNTER (OUTPATIENT)
Dept: GENERAL RADIOLOGY | Facility: HOSPITAL | Age: 59
Discharge: HOME | End: 2024-05-28
Payer: MEDICAID

## 2024-05-28 ENCOUNTER — HOSPITAL ENCOUNTER (OUTPATIENT)
Facility: SURGERY CENTER | Age: 59
Setting detail: HOSPITAL OUTPATIENT SURGERY
Discharge: HOME | End: 2024-05-28
Attending: ANESTHESIOLOGY | Admitting: ANESTHESIOLOGY
Payer: MEDICAID

## 2024-05-28 ENCOUNTER — APPOINTMENT (OUTPATIENT)
Dept: GENERAL RADIOLOGY | Facility: SURGERY CENTER | Age: 59
End: 2024-05-28
Payer: MEDICAID

## 2024-05-28 VITALS
RESPIRATION RATE: 17 BRPM | HEART RATE: 43 BPM | OXYGEN SATURATION: 99 % | SYSTOLIC BLOOD PRESSURE: 140 MMHG | BODY MASS INDEX: 23.46 KG/M2 | WEIGHT: 177 LBS | TEMPERATURE: 97.9 F | HEIGHT: 73 IN | DIASTOLIC BLOOD PRESSURE: 80 MMHG

## 2024-05-28 DIAGNOSIS — R52 PAIN: ICD-10-CM

## 2024-05-28 DIAGNOSIS — M47.817 LUMBOSACRAL SPONDYLOSIS WITHOUT MYELOPATHY: ICD-10-CM

## 2024-05-28 PROCEDURE — 25010000002 BUPIVACAINE (PF) 0.5 % SOLUTION: Performed by: ANESTHESIOLOGY

## 2024-05-28 PROCEDURE — C1755 CATHETER, INTRASPINAL: HCPCS

## 2024-05-28 PROCEDURE — 64493 INJ PARAVERT F JNT L/S 1 LEV: CPT | Performed by: ANESTHESIOLOGY

## 2024-05-28 PROCEDURE — 77002 NEEDLE LOCALIZATION BY XRAY: CPT

## 2024-05-28 PROCEDURE — 64495 INJ PARAVERT F JNT L/S 3 LEV: CPT | Performed by: ANESTHESIOLOGY

## 2024-05-28 PROCEDURE — 64494 INJ PARAVERT F JNT L/S 2 LEV: CPT | Performed by: ANESTHESIOLOGY

## 2024-05-28 RX ORDER — LIDOCAINE HYDROCHLORIDE 10 MG/ML
5 INJECTION, SOLUTION EPIDURAL; INFILTRATION; INTRACAUDAL; PERINEURAL ONCE
Status: DISCONTINUED | OUTPATIENT
Start: 2024-05-28 | End: 2024-05-28

## 2024-05-28 RX ORDER — BUPIVACAINE HYDROCHLORIDE 5 MG/ML
4 INJECTION, SOLUTION PERINEURAL ONCE
Status: DISCONTINUED | OUTPATIENT
Start: 2024-05-28 | End: 2024-05-28

## 2024-05-28 RX ADMIN — BUPIVACAINE HYDROCHLORIDE 4 ML: 5 INJECTION, SOLUTION EPIDURAL; INTRACAUDAL; PERINEURAL at 09:02

## 2024-05-28 RX ADMIN — LIDOCAINE HYDROCHLORIDE 5 ML: 10 INJECTION, SOLUTION EPIDURAL; INFILTRATION; INTRACAUDAL; PERINEURAL at 08:57

## 2024-05-28 NOTE — OP NOTE
RIGHT L2-5 Lumbar Medial Branch Blockade  [facility]    PREOPERATIVE DIAGNOSIS:  Lumbar spondylosis without myelopathy    POSTOPERATIVE DIAGNOSIS:  Lumbar spondylosis without myelopathy    PROCEDURE:   Diagnostic Right Lumbar Medial Branch Nerve Blockades, with fluoroscopy:  L2, L3, L4, and L5 nerves (at the L3, L4, L5 transverse processes and the sacral alar groove) to block facet joints L3-4, L4-5, and L5-S1  31348 -- Lumbar Facet block, 1st Level  51110 -- Lumbar Facet block, 2nd  Level  07287 -- Lumbar Facet block, 3rd Level    PRE-PROCEDURE DISCUSSION WITH PATIENT:    Risks and complications were discussed with the patient prior to starting the procedure and informed consent was obtained.      SURGEON:   Elier Matute MD    REASON FOR PROCEDURE:    The patient complains of pain that seems to have a significant axial component and Previous diagnostic positivity of a Lumbar Medial Branch Blockade at the same levels    SEDATION:  Patient declined administration of moderate sedation    ANESTHETIC:  Marcaine 0.5%  STEROID:  NONE  TOTAL VOLUME OF SOLUTION:  4 mL    DESCRIPTON OF PROCEDURE:  After obtaining informed consent, IV access was not obtained in the preoperative area.   The patient was taken to the operating room.  The patient was placed in the prone position with a pillow under the abdomen. All pressure points were well padded.  EKG, blood pressure, and pulse oximeter were monitored.  The patient was monitored and sedated by the RN under my direction. The lumbosacral area was prepped with Chloraprep and draped in a sterile fashion. Under fluoroscopic guidance the transverse processes of the L3, L4, and L5 vertebrae at the junctions of the superior articular processes were identified on the affected side.  Also identified was the groove between the ala and the superior articular process of the sacrum on the ipsilateral side.  Skin and subcutaneous tissue were anesthetized with 1% lidocaine above  each of these points. A spinal needle was introduced under fluoroscopic guidance at the above junctions. Aspiration was negative for blood and CSF.  After confirming the position of the needle with fluoroscope in all views, 0.25 mL of Omnipaque was injected, and after seeing the proper spread a total of 1 mL of the anesthetic solution noted above was injected at each of these points.  Needles were removed intact from each of the areas.   Onset of analgesia was noted.  Vital signs remained stable throughout.      ESTIMATED BLOOD LOSS:  <5 mL  SPECIMENS:  none    COMPLICATIONS:   No complications were noted., There was no indication of vascular uptake on live injection of contrast dye., and The patient did not have any signs of postprocedure numbness nor weakness.    TOLERANCE & DISCHARGE CONDITION:    The patient tolerated the procedure well.  The patient was transported to the recovery area without difficulties.  The patient was discharged to home under the care of family in stable and satisfactory condition.    PLAN OF CARE:  The patient was given our standard instruction sheet.  We discussed that Lumbar Medial Branch Blockade is a diagnostic procedure in consideration for radiofrequency ablation if two diagnostic procedures prove to be positive for significant benefit.  If sustained relief of 6 to eight weeks is obtained, then an alternative plan could be therapeutic lumbar branch blockades.  The patient is asked to keep a pain log each hour for 8 hours after the procedure today.  The patient will  Return to clinic 1 wk  The patient will resume all medications as per the medication reconciliation sheet.

## 2024-05-28 NOTE — DISCHARGE INSTRUCTIONS
[facility] - Post-procedure Instructions          --  While there are no absolute restrictions, it is recommended that you do not perform strenuous activity today. In the morning, you may resume your level of activity as before your block.    --  If you have a band-aid at your injection site, please remove it later today. Observe the area for any redness, swelling, pus-like drainage, or a temperature over 101°. If any of these symptoms occur, please call your doctor at [phone]. If after office hours, leave a message and the on-call provider will return your call.    --  Ice may be applied to your injection site. It is recommended you avoid direct heat (heating pad; hot tub) for 1-2 days.    --  Call [facility]-Pain Management at [phone] if you experience persistent headache, persistent bleeding from the injection site, or severe pain not relieved by heat or oral medication.    --  Do not make important decisions today.    --  Due to the effects of the block and/or the I.V. Sedation, DO NOT drive or operate hazardous machinery for 12 hours.  Local anesthetics may cause numbness after procedure and precautions must be taken with regards to operating equipment as well as with walking, even if ambulating with assistance of another person or with an assistive device.    --  Do not drink alcohol for 12 hours.    -- You may return to work tomorrow, or as directed by your referring doctor.    --  Occasionally you may notice a slight increase in your pain after the procedure. This should start to improve within the next 24-48 hours. Radiofrequency ablation procedure pain may last 3-4 weeks.    --  It may take as long as 3-4 days before you notice a gradual improvement in your pain and/or other symptoms.    -- You may continue to take your prescribed pain medication as needed.    --  Some normal possible side effects of steroid use could include fluid retention, increased blood sugar, dull headache,  increased sweating, increased appetite, mood swings and flushing.    --  Diabetics are recommended to watch their blood glucose level closely for 24-48 hours after the injection.    --  Must stay in PACU for 20 min upon arrival and prove no leg weakness before being discharged.    --  IN THE EVENT OF A LIFE THREATENING EMERGENCY, (CHEST PAIN, BREATHING DIFFICULTIES, PARALYSIS…) YOU SHOULD GO TO YOUR NEAREST EMERGENCY ROOM.    --  You should be contacted by our office within 2-3 days to schedule follow up or next appointment date.  If not contacted within 7 days, please call the office at [phone]

## 2024-05-30 ENCOUNTER — TELEPHONE (OUTPATIENT)
Dept: PAIN MEDICINE | Facility: HOSPITAL | Age: 59
End: 2024-05-30

## 2024-05-30 NOTE — TELEPHONE ENCOUNTER
Post procedure pain injection follow up:  Spoke to patient who stated the injection did not help with his pain. Stated he had a spasm the night before so back was very inflamed. Stated finally feeling better today.

## 2024-06-26 ENCOUNTER — TELEPHONE (OUTPATIENT)
Dept: PAIN MEDICINE | Facility: CLINIC | Age: 59
End: 2024-06-26

## 2024-06-26 NOTE — TELEPHONE ENCOUNTER
Provider: ARTEM NELSON     Caller: PATIENT     Phone Number: [phone]    Reason for Call: PLEASE CALLED TO CONFIRM TIME OF PROCEDURE TOMORROW

## 2024-06-27 ENCOUNTER — APPOINTMENT (OUTPATIENT)
Dept: GENERAL RADIOLOGY | Facility: SURGERY CENTER | Age: 59
End: 2024-06-27
Payer: MEDICAID

## 2024-07-15 ENCOUNTER — OFFICE VISIT (OUTPATIENT)
Dept: PAIN MEDICINE | Facility: CLINIC | Age: 59
End: 2024-07-15
Payer: MEDICAID

## 2024-07-15 ENCOUNTER — PREP FOR SURGERY (OUTPATIENT)
Dept: SURGERY | Facility: SURGERY CENTER | Age: 59
End: 2024-07-15
Payer: MEDICAID

## 2024-07-15 VITALS
BODY MASS INDEX: 24.18 KG/M2 | TEMPERATURE: 96.9 F | SYSTOLIC BLOOD PRESSURE: 134 MMHG | RESPIRATION RATE: 12 BRPM | OXYGEN SATURATION: 98 % | HEIGHT: 73 IN | WEIGHT: 182.4 LBS | HEART RATE: 49 BPM | DIASTOLIC BLOOD PRESSURE: 84 MMHG

## 2024-07-15 DIAGNOSIS — M51.36 DDD (DEGENERATIVE DISC DISEASE), LUMBAR: ICD-10-CM

## 2024-07-15 DIAGNOSIS — M47.817 LUMBOSACRAL SPONDYLOSIS WITHOUT MYELOPATHY: Primary | ICD-10-CM

## 2024-07-15 DIAGNOSIS — M62.838 MUSCLE SPASM: ICD-10-CM

## 2024-07-15 PROCEDURE — 1125F AMNT PAIN NOTED PAIN PRSNT: CPT | Performed by: PHYSICIAN ASSISTANT

## 2024-07-15 PROCEDURE — 1159F MED LIST DOCD IN RCRD: CPT | Performed by: PHYSICIAN ASSISTANT

## 2024-07-15 PROCEDURE — 1160F RVW MEDS BY RX/DR IN RCRD: CPT | Performed by: PHYSICIAN ASSISTANT

## 2024-07-15 PROCEDURE — 99214 OFFICE O/P EST MOD 30 MIN: CPT | Performed by: PHYSICIAN ASSISTANT

## 2024-07-15 RX ORDER — METHOCARBAMOL 500 MG/1
500 TABLET, FILM COATED ORAL 3 TIMES DAILY
Qty: 90 TABLET | Refills: 0 | Status: SHIPPED | OUTPATIENT
Start: 2024-07-15

## 2024-07-15 NOTE — PROGRESS NOTES
CHIEF COMPLAINT  Pain is worse.    Right L2-5 MBB no relief; at the time he was lifting weights with his son which signifcantly  changes his pain--since May; now having light spasms more consistently  Increased pain across the lumbar spine which is now continuously--no leg pain; no n/t/weakness in legs    Try another MBB Take 1 tablet by mouth 2 (Two) Times a Day   Trial methocarbameol 500 mg tid      Tello Hartmann is a 58 y.o. male  who presents for follow-up.  He has a history of ***.          Back Pain  This is a chronic problem. The current episode started more than 1 year ago. The problem occurs constantly. The problem has been worse since onset. The pain is present in the lumbar spine. The quality of the pain is described as aching. The pain does not radiate. The pain is at a severity of 7/10. The pain is moderate. The pain is Worse during the night. The symptoms are aggravated by sitting (turning over in the bed). Pertinent negatives include no abdominal pain, chest pain, dysuria, fever, headaches, numbness or weakness. He has tried home exercises for the symptoms. The treatment provided mild relief.        PEG Assessment   What number best describes your pain on average in the past week?7  What number best describes how, during the past week, pain has interfered with your enjoyment of life?7  What number best describes how, during the past week, pain has interfered with your general activity?  7    Review of Pertinent Medical Data ---  No new imaging to review    {Common H&P Review Areas:82343}    Review of Systems   Constitutional:  Negative for activity change, fatigue and fever.   Respiratory:  Negative for cough and chest tightness.    Cardiovascular:  Negative for chest pain.   Gastrointestinal:  Negative for abdominal pain, constipation and diarrhea.   Genitourinary:  Negative for difficulty urinating and dysuria.   Musculoskeletal:  Positive for back pain.   Neurological:  Negative for  "dizziness, weakness, light-headedness, numbness and headaches.   Psychiatric/Behavioral:  Positive for agitation and sleep disturbance. Negative for suicidal ideas. The patient is not nervous/anxious.      ***  Vitals:    07/15/24 1016   BP: 134/84   BP Location: Right arm   Patient Position: Sitting   Cuff Size: Adult   Pulse: (!) 49   Resp: 12   Temp: 96.9 °F (36.1 °C)   TempSrc: Temporal   SpO2: 98%   Weight: 82.7 kg (182 lb 6.4 oz)   Height: 185.4 cm (73\")   PainSc:   7         Objective   Physical Exam  Vitals and nursing note reviewed.   Constitutional:       Appearance: Normal appearance. He is normal weight.   HENT:      Head: Normocephalic.   Pulmonary:      Effort: Pulmonary effort is normal.   Musculoskeletal:      Lumbar back: Tenderness (mild pain to palpation over the right lumbar paraspinal muscles/facet joint spaces) present. Decreased range of motion (pain increased with lumbar extension).        Back:       Comments: Right sided back pain is worse than left   Skin:     General: Skin is warm and dry.   Neurological:      General: No focal deficit present.      Mental Status: He is alert and oriented to person, place, and time.      Cranial Nerves: Cranial nerves 2-12 are intact.      Sensory: Sensation is intact.      Motor: Motor function is intact.      Gait: Gait is intact.   Psychiatric:         Mood and Affect: Mood normal.         Behavior: Behavior normal.         Thought Content: Thought content normal.         Judgment: Judgment normal.         Assessment & Plan   Diagnoses and all orders for this visit:    1. Lumbosacral spondylosis without myelopathy (Primary)    2. DDD (degenerative disc disease), lumbar    3. Muscle spasm        Serafin Hartmann reports a pain score of 7.  Given his pain assessment as noted, treatment options were discussed and the following options were decided upon as a follow-up plan to address the patient's pain: continuation of current treatment plan for pain, " prescription for non-opiod analgesics, and use of non-medical modalities (ice, heat, stretching and/or behavior modifications).      --- Follow-up ***                 ***     Dictated utilizing Dragon dictation.

## 2024-07-15 NOTE — H&P (VIEW-ONLY)
CHIEF COMPLAINT  Low back pain      Subjective   Serafin Hartmann is a 58 y.o. male  who presents to the office for follow-up of procedure.  He completed a #2 right L2-L5 lumbar medial branch block on 5/28/2024 performed by Dr. Matute for management of low back pain. Patient reports mixed results relief from the procedure.  The patient states that unfortunately at the time of his injection he had been helping his son work out at the gym and was demonstrating how to lift weights and believes that he has significantly exacerbated his back pain.  Since that incident has occurred he has had significant worsening of pain stating that the pain is now more continuous in nature where it was more intermittent prior to this incident.  He states that he is also unsure if he may have injured his back with an increased daily stretching exercise program that he was doing for at least 30 minutes daily.  His primary pain complaint continues to be right-sided lumbosacral spine pain but does feel that with significant increased pain that refers over to the left paraspinal muscles.  Denies lower extremity involvement.  Patient had obtained over 80% reduction of pain for approximately 8 hours after the first diagnostic injection stated that that was the first time he felt that great in over 20 years.    Pain today 7/10 VAS in severity.        Back Pain  This is a chronic problem. The current episode started more than 1 year ago. The problem occurs constantly. The problem has been worse since onset. The pain is present in the lumbar spine. The quality of the pain is described as aching. The pain does not radiate. The pain is at a severity of 7/10. The pain is moderate. The pain is The same all the time. The symptoms are aggravated by sitting, position, standing, twisting and bending (turning over in the bed). Pertinent negatives include no abdominal pain, chest pain, dysuria, fever, headaches, numbness or weakness. He has tried home  "exercises for the symptoms. The treatment provided mild relief.        PEG Assessment   What number best describes your pain on average in the past week?7  What number best describes how, during the past week, pain has interfered with your enjoyment of life?7  What number best describes how, during the past week, pain has interfered with your general activity?  7    Review of Pertinent Medical Data ---  No new imaging to review    The following portions of the patient's history were reviewed and updated as appropriate: allergies, current medications, past family history, past medical history, past social history, past surgical history, and problem list.    Review of Systems   Constitutional:  Negative for fever.   Cardiovascular:  Negative for chest pain.   Gastrointestinal:  Negative for abdominal pain.   Genitourinary:  Negative for dysuria.   Neurological:  Negative for weakness, numbness and headaches.     I have reviewed and confirmed the accuracy of the ROS as documented by the MA/LPN/RN CHUYITA Gallego   Vitals:    07/15/24 1016   BP: 134/84   BP Location: Right arm   Patient Position: Sitting   Cuff Size: Adult   Pulse: (!) 49   Resp: 12   Temp: 96.9 °F (36.1 °C)   TempSrc: Temporal   SpO2: 98%   Weight: 82.7 kg (182 lb 6.4 oz)   Height: 185.4 cm (73\")   PainSc:   7         Objective   Physical Exam  Vitals and nursing note reviewed.   Constitutional:       Appearance: Normal appearance. He is normal weight.   HENT:      Head: Normocephalic.   Pulmonary:      Effort: Pulmonary effort is normal.   Musculoskeletal:      Lumbar back: Tenderness (mild pain to palpation over the right lumbar paraspinal muscles/facet joint spaces) present. Decreased range of motion (pain increased with lumbar extension).        Back:    Skin:     General: Skin is warm and dry.   Neurological:      General: No focal deficit present.      Mental Status: He is alert and oriented to person, place, and time.      Cranial Nerves: " Cranial nerves 2-12 are intact.      Sensory: Sensation is intact.      Motor: Motor function is intact.      Gait: Gait is intact.   Psychiatric:         Mood and Affect: Mood normal.         Behavior: Behavior normal.         Thought Content: Thought content normal.         Judgment: Judgment normal.             Assessment & Plan   Diagnoses and all orders for this visit:    1. Lumbosacral spondylosis without myelopathy (Primary)    2. DDD (degenerative disc disease), lumbar    3. Muscle spasm  -     methocarbamol (ROBAXIN) 500 MG tablet; Take 1 tablet by mouth 3 (Three) Times a Day.  Dispense: 90 tablet; Refill: 0        Serafin Hartmann reports a pain score of 7.  Given his pain assessment as noted, treatment options were discussed and the following options were decided upon as a follow-up plan to address the patient's pain: continuation of current treatment plan for pain and use of non-medical modalities (ice, heat, stretching and/or behavior modifications).      --- Due to the acute on chronic onset of back pain at the time of his injection I do feel that it is reasonable to repeat the confirmatory injection for right L2-L5 medial branch block nerves with plan to continue to radiofrequency ablation if favorable response is obtained.  --- The patient is experiencing some spasms from this acute onset of back pain therefore I will have him proceed with methocarbamol 500 mg to be taken up to 3 times daily as necessary.  --- Follow-up 1 week after undergoing injective therapy  --- If there is not improvement of pain then I would repeat the lumbar MRI to make sure that he is not herniated the disc      Diagnostic Facet Joint Procedure:   MBJOSIAH     The confirmatory diagnostic facet joint procedure is considered medically reasonable and necessary for the diagnosis and treatment of chronic pain for this patient due to the patient meeting all of the following criteria:    - 1. Moderate to severe chronic neck or low back  pain, predominantly axial, that causes functional deficit measured on pain or disability scale.  - 2. Pain present for minimum of 3 months with documented failure to respond to noninvasive conservative management (as tolerated)  - 3. Absence of untreated radiculopathy or neurogenic claudication (except for radiculopathy caused by facet joint synovial cyst)  - 4. There is no non-facet pathology per clinical assessment or radiology studies that could explain the source of the patient’s pain, including but not limited to fracture, tumor, infection, or significant deformity.    The patient has also shown a consistent positive response of at least 80% relief of primary (index) pain (with the duration of relief being consistent with the agent used).               Dictated utilizing Dragon dictation.

## 2024-08-01 ENCOUNTER — TELEPHONE (OUTPATIENT)
Dept: INTERNAL MEDICINE | Age: 59
End: 2024-08-01
Payer: MEDICAID

## 2024-08-01 ENCOUNTER — TELEMEDICINE (OUTPATIENT)
Dept: INTERNAL MEDICINE | Age: 59
End: 2024-08-01
Payer: MEDICAID

## 2024-08-01 DIAGNOSIS — Z87.39 HISTORY OF GOUT: Primary | ICD-10-CM

## 2024-08-01 PROCEDURE — 1125F AMNT PAIN NOTED PAIN PRSNT: CPT

## 2024-08-01 PROCEDURE — 99213 OFFICE O/P EST LOW 20 MIN: CPT

## 2024-08-01 RX ORDER — COLCHICINE 0.6 MG/1
TABLET ORAL
Qty: 9 TABLET | Refills: 0 | Status: SHIPPED | OUTPATIENT
Start: 2024-08-01

## 2024-08-01 NOTE — TELEPHONE ENCOUNTER
Recommend he be seen today by either our office or urgent care for evaluation of symptoms, consideration of prescription.

## 2024-08-01 NOTE — TELEPHONE ENCOUNTER
Can you please see if he can do telehealth today at 330 to review symptoms, place prescription order.  A different provider in our office previously prescribed COLCHICINE.

## 2024-08-01 NOTE — PROGRESS NOTES
I N T E R N A L  M E D I C I N E  SANKET ELLIS, APRN      ENCOUNTER DATE:  08/01/2024    Serafin Hartmann / 58 y.o. / male        You have chosen to receive care through a telehealth visit.  Do you consent to use a video/audio connection for your medical care today? Yes    Location of patient is in [place]       CHIEF COMPLAINT / REASON FOR OFFICE VISIT     TELEHEALTH ENCOUNTER:  Gout      ASSESSMENT & PLAN     Diagnoses and all orders for this visit:    1. History of gout (Primary)  -     colchicine 0.6 MG tablet; 2 tabs by mouth and 1 tab 1 hr later, 12 hrs later take 1 tab. take 1 tab 1-2 times a day until 2 days after symptoms resolve.  Dispense: 9 tablet; Refill: 0          SUMMARY/DISCUSSION  Symptoms sound consistent with possible right knee overuse.  Recommend rest, ice PRN, elevation of right knee at this time.  If his right knee symptoms progress to feel similar to his prior gout attack experienced in right knee, he may proceed with RX.  If he experiences acutely worsening symptoms of redness, warmth, pain, he will need recurrent evaluation.  He acknowledges understanding.      Time spent: 15 minutes    Next Appointment with me: Visit date not found    Return for Next scheduled follow up.        HISTORY OF PRESENT ILLNESS     Symptoms started a day or so ago with some pain to inferior right knee, mild swelling.  Attributes symptoms to possible overuse, as he is a regular bicycle rider.  +Right knee surgery.  No recent trauma or injury.  No joint warmth, erythema.  No fever, chills.  No other joint pain.    Medical history is significant for gout, for which he continues on allopurinol as prescribed.  May 2024 Uric Acid 4.6.      He will be traveling soon to [facility] and would like a prescription for colchicine to have on hand PRN, as he has had prior gout exacerbation impacting his right knee.           REVIEWED DATA     Labs:           Imaging:           Medical Tests:           Summary of old  records / correspondence / consultant report:           Request outside records:         Template created by Fady Winslow MD

## 2024-08-01 NOTE — TELEPHONE ENCOUNTER
Caller: Serafin Hartmann    Relationship: Self    Best call back number: [phone]     What medication are you requesting: COLCHICINE .6MG     What are your current symptoms: STARTING TO GET A GOUT FLARE UP ON KNEE    How long have you been experiencing symptoms: 1 DAY    Have you had these symptoms before:    [x] Yes  [] No    Have you been treated for these symptoms before:   [x] Yes  [] No    If a prescription is needed, what is your preferred pharmacy and phone number:      [id number] - [place], [place] - [address] AT [address] - [phone] [facility] [phone] [facility]     Additional notes:LEAVING TO GO OUT OF TOWN ON 8/2 AND ASKING OF THIS CAN BE CALLED IN TODAY.

## 2024-08-13 ENCOUNTER — PREP FOR SURGERY (OUTPATIENT)
Dept: OTHER | Facility: HOSPITAL | Age: 59
End: 2024-08-13
Payer: MEDICAID

## 2024-08-13 DIAGNOSIS — M47.816 LUMBAR FACET ARTHROPATHY: Primary | ICD-10-CM

## 2024-08-13 RX ORDER — IOPAMIDOL 408 MG/ML
12 INJECTION, SOLUTION INTRATHECAL
Status: CANCELLED | OUTPATIENT
Start: 2024-08-14

## 2024-08-13 RX ORDER — LIDOCAINE HYDROCHLORIDE 10 MG/ML
10 INJECTION, SOLUTION INFILTRATION; PERINEURAL ONCE
Status: CANCELLED | OUTPATIENT
Start: 2024-08-14

## 2024-08-13 RX ORDER — BUPIVACAINE HYDROCHLORIDE 5 MG/ML
10 INJECTION, SOLUTION EPIDURAL; INTRACAUDAL ONCE
Status: CANCELLED | OUTPATIENT
Start: 2024-08-14

## 2024-08-14 ENCOUNTER — HOSPITAL ENCOUNTER (OUTPATIENT)
Dept: GENERAL RADIOLOGY | Facility: HOSPITAL | Age: 59
Discharge: HOME | End: 2024-08-14
Payer: MEDICAID

## 2024-08-14 ENCOUNTER — HOSPITAL ENCOUNTER (OUTPATIENT)
Dept: PAIN MEDICINE | Facility: HOSPITAL | Age: 59
Discharge: HOME | End: 2024-08-14
Payer: MEDICAID

## 2024-08-14 VITALS
RESPIRATION RATE: 14 BRPM | DIASTOLIC BLOOD PRESSURE: 88 MMHG | HEART RATE: 45 BPM | TEMPERATURE: 98 F | SYSTOLIC BLOOD PRESSURE: 151 MMHG | OXYGEN SATURATION: 99 %

## 2024-08-14 DIAGNOSIS — R52 PAIN: ICD-10-CM

## 2024-08-14 DIAGNOSIS — M47.817 LUMBOSACRAL SPONDYLOSIS WITHOUT MYELOPATHY: ICD-10-CM

## 2024-08-14 DIAGNOSIS — M47.816 LUMBAR FACET ARTHROPATHY: ICD-10-CM

## 2024-08-14 PROCEDURE — 25010000002 BUPIVACAINE (PF) 0.5 % SOLUTION: Performed by: ANESTHESIOLOGY

## 2024-08-14 PROCEDURE — 25510000001 IOPAMIDOL 41 % SOLUTION: Performed by: ANESTHESIOLOGY

## 2024-08-14 PROCEDURE — 77003 FLUOROGUIDE FOR SPINE INJECT: CPT

## 2024-08-14 RX ORDER — LIDOCAINE HYDROCHLORIDE 10 MG/ML
10 INJECTION, SOLUTION INFILTRATION; PERINEURAL ONCE
Status: DISCONTINUED | OUTPATIENT
Start: 2024-08-14 | End: 2024-08-15 | Stop reason: HOSPADM

## 2024-08-14 RX ORDER — BUPIVACAINE HYDROCHLORIDE 5 MG/ML
10 INJECTION, SOLUTION EPIDURAL; INTRACAUDAL ONCE
Status: COMPLETED | OUTPATIENT
Start: 2024-08-14 | End: 2024-08-14

## 2024-08-14 RX ORDER — IOPAMIDOL 408 MG/ML
12 INJECTION, SOLUTION INTRATHECAL
Status: COMPLETED | OUTPATIENT
Start: 2024-08-14 | End: 2024-08-14

## 2024-08-14 RX ADMIN — IOPAMIDOL 10 ML: 408 INJECTION, SOLUTION INTRATHECAL at 10:29

## 2024-08-14 RX ADMIN — BUPIVACAINE HYDROCHLORIDE 10 ML: 5 INJECTION, SOLUTION EPIDURAL; INTRACAUDAL; PERINEURAL at 10:29

## 2024-08-14 NOTE — PROCEDURES
Procedures    RIGHT L2-5 Lumbar Medial Branch Blockade  [facility]    PREOPERATIVE DIAGNOSIS:  Lumbar spondylosis without myelopathy    POSTOPERATIVE DIAGNOSIS:  Lumbar spondylosis without myelopathy    PROCEDURE:   Diagnostic Right Lumbar Medial Branch Nerve Blockades, with fluoroscopy:  L2, L3, L4, and L5 nerves (at the L3, L4, L5 transverse processes and the sacral alar groove) to block facet joints L3-4, L4-5, and L5-S1  70253 -- Lumbar Facet block, 1st Level  89552 -- Lumbar Facet block, 2nd  Level  68177 -- Lumbar Facet block, 3rd Level    PRE-PROCEDURE DISCUSSION WITH PATIENT:    Risks and complications were discussed with the patient prior to starting the procedure and informed consent was obtained.      Preprocedure assessment/presedation assessment is noted as the H&P/H&P update as part of this Epic chart encounter.  Preassessment plan and preprocedure airway assessment are noted.  Immediate in the room airway assessment is unchanged from the preprocedure assessment performed in the preoperative area a few minutes ago.      SURGEON:   Elier aMtute MD    REASON FOR PROCEDURE:    The patient complains of pain that seems to have a significant axial component and Previous diagnostic positivity of a Lumbar Medial Branch Blockade at the same levels.  As noted in the H&P, there was confusion about the response to the second LMBB and he had some rather extreme activity that day of the block, so a repeat confirmation block is the most conservative and appropriate course before deciding whether or not to proceed to RF or not.      SEDATION:  Patient declined administration of moderate sedation    ANESTHETIC:  Marcaine 0.5%  STEROID:  NONE  TOTAL VOLUME OF SOLUTION:  4 mL    DESCRIPTON OF PROCEDURE:  After obtaining informed consent, IV access was not obtained in the preoperative area.   The patient was taken to the operating room.  The patient was placed in the prone position with a pillow under the  abdomen. All pressure points were well padded.  EKG, blood pressure, and pulse oximeter were monitored.  The patient was monitored and sedated by the RN under my direction. The lumbosacral area was prepped with Chloraprep and draped in a sterile fashion. Under fluoroscopic guidance the transverse processes of the L3, L4, and L5 vertebrae at the junctions of the superior articular processes were identified on the affected side.  Also identified was the groove between the ala and the superior articular process of the sacrum on the ipsilateral side.  Skin and subcutaneous tissue were anesthetized with 1% lidocaine above each of these points. A spinal needle was introduced under fluoroscopic guidance at the above junctions. Aspiration was negative for blood and CSF.  After confirming the position of the needle with fluoroscope in all views, 0.25 mL of Omnipaque was injected, and after seeing the proper spread a total of 1 mL of the anesthetic solution noted above was injected at each of these points.  Needles were removed intact from each of the areas.   Onset of analgesia was noted.  Vital signs remained stable throughout.      ESTIMATED BLOOD LOSS:  <5 mL  SPECIMENS:  none    COMPLICATIONS:   No complications were noted.    TOLERANCE & DISCHARGE CONDITION:    The patient tolerated the procedure well.  The patient was transported to the recovery area without difficulties.  The patient was discharged to home under the care of family in stable and satisfactory condition.    PLAN OF CARE:  The patient was given our standard instruction sheet.  We discussed that Lumbar Medial Branch Blockade is a diagnostic procedure in consideration for radiofrequency ablation if two diagnostic procedures prove to be positive for significant benefit.  If sustained relief of 6 to eight weeks is obtained, then an alternative plan could be therapeutic lumbar branch blockades.  The patient is asked to keep a pain log each hour for 8 hours  after the procedure today.  The patient will  Return to clinic 1 wk  The patient will resume all medications as per the medication reconciliation sheet.

## 2024-08-14 NOTE — INTERVAL H&P NOTE
H&P reviewed. The patient was examined and there are no changes to the H&P.    Mallampati:   II (hard and soft palate, upper portion of tonsils anduvula visible)    ASA Physical Status Classification: ASA 2 - Patient with mild systemic disease with no functional limitations    Preprocedure/preassessment plan is as noted.

## 2024-08-21 ENCOUNTER — OFFICE VISIT (OUTPATIENT)
Dept: PAIN MEDICINE | Facility: CLINIC | Age: 59
End: 2024-08-21
Payer: MEDICAID

## 2024-08-21 VITALS
HEIGHT: 73 IN | DIASTOLIC BLOOD PRESSURE: 82 MMHG | TEMPERATURE: 97.7 F | HEART RATE: 46 BPM | OXYGEN SATURATION: 98 % | BODY MASS INDEX: 24.7 KG/M2 | SYSTOLIC BLOOD PRESSURE: 151 MMHG | WEIGHT: 186.4 LBS

## 2024-08-21 DIAGNOSIS — M51.36 DDD (DEGENERATIVE DISC DISEASE), LUMBAR: ICD-10-CM

## 2024-08-21 DIAGNOSIS — M62.838 MUSCLE SPASM: ICD-10-CM

## 2024-08-21 DIAGNOSIS — M47.817 LUMBOSACRAL SPONDYLOSIS WITHOUT MYELOPATHY: Primary | ICD-10-CM

## 2024-08-21 PROCEDURE — 1159F MED LIST DOCD IN RCRD: CPT | Performed by: PHYSICIAN ASSISTANT

## 2024-08-21 PROCEDURE — 1160F RVW MEDS BY RX/DR IN RCRD: CPT | Performed by: PHYSICIAN ASSISTANT

## 2024-08-21 PROCEDURE — 99212 OFFICE O/P EST SF 10 MIN: CPT | Performed by: PHYSICIAN ASSISTANT

## 2024-08-21 PROCEDURE — 1125F AMNT PAIN NOTED PAIN PRSNT: CPT | Performed by: PHYSICIAN ASSISTANT

## 2024-08-21 NOTE — PROGRESS NOTES
CHIEF COMPLAINT  F/U back pain- RIGHT L2-5 Lumbar Medial Branch Blockade-        Subjective   Serafin Hartmann is a 58 y.o. male  who presents to the office for follow-up of procedure.  He completed a right L2-L5 lumbar MBB   on 8/14/2024 performed by Dr. Matute for management of low back pain. Patient reports minimal relief from the procedure.  Patient states that he was not having a bad pain flare on the day of his procedure therefore he is unable to determine if it provided any relief.  He continues to report primarily right-sided lumbosacral spine pain but feels that his pain fluctuates continuously on a daily basis.    Pain today 2/10 VAS in severity.        Back Pain  This is a chronic problem. The current episode started more than 1 year ago. The problem occurs constantly. The problem has been worse since onset. The pain is present in the lumbar spine. The quality of the pain is described as aching. The pain does not radiate. The pain is at a severity of 2/10. The pain is mild. The pain is The same all the time. The symptoms are aggravated by sitting, position, standing, twisting and bending (turning over in the bed). Pertinent negatives include no abdominal pain, chest pain, dysuria, fever, headaches, numbness or weakness. He has tried home exercises for the symptoms. The treatment provided mild relief.        PEG Assessment     Review of Pertinent Medical Data ---  No new imaging for review on today    The following portions of the patient's history were reviewed and updated as appropriate: allergies, current medications, past family history, past medical history, past social history, past surgical history, and problem list.    Review of Systems   Constitutional:  Negative for activity change, chills, fatigue and fever.   HENT:  Negative for congestion.    Eyes:  Negative for visual disturbance.   Respiratory:  Negative for chest tightness and shortness of breath.    Cardiovascular:  Negative for chest pain.  "  Gastrointestinal:  Negative for abdominal pain, constipation and diarrhea.   Genitourinary:  Negative for difficulty urinating and dysuria.   Musculoskeletal:  Positive for back pain.   Neurological:  Negative for dizziness, weakness, light-headedness, numbness and headaches.   Psychiatric/Behavioral:  Positive for sleep disturbance. Negative for agitation. The patient is not nervous/anxious.      I have reviewed and confirmed the accuracy of the ROS as documented by the MA/LPN/RN CHUYITA Gallego   Vitals:    08/21/24 0906   BP: 151/82   Pulse: (!) 46   Temp: 97.7 °F (36.5 °C)   SpO2: 98%   Weight: 84.6 kg (186 lb 6.4 oz)   Height: 185.4 cm (73\")   PainSc:   2   PainLoc: Back         Objective   Physical Exam  Vitals and nursing note reviewed.   Constitutional:       Appearance: Normal appearance. He is normal weight.   HENT:      Head: Normocephalic.   Pulmonary:      Effort: Pulmonary effort is normal.   Musculoskeletal:      Lumbar back: Tenderness (mild pain to palpation over the right lumbar paraspinal muscles/facet joint spaces) present. Decreased range of motion (pain increased with lumbar extension).        Back:    Skin:     General: Skin is warm and dry.   Neurological:      General: No focal deficit present.      Mental Status: He is alert and oriented to person, place, and time.      Cranial Nerves: Cranial nerves 2-12 are intact.      Sensory: Sensation is intact.      Motor: Motor function is intact.      Gait: Gait is intact.   Psychiatric:         Mood and Affect: Mood normal.         Behavior: Behavior normal.         Thought Content: Thought content normal.         Judgment: Judgment normal.             Assessment & Plan   Diagnoses and all orders for this visit:    1. Lumbosacral spondylosis without myelopathy (Primary)    2. DDD (degenerative disc disease), lumbar    3. Muscle spasm        Serafin Hartmann reports a pain score of 2.  Given his pain assessment as noted, treatment options were " discussed and the following options were decided upon as a follow-up plan to address the patient's pain: continuation of current treatment plan for pain and use of non-medical modalities (ice, heat, stretching and/or behavior modifications).      --- Follow-up as needed.  I offer the patient a trial of physical therapy for evaluation with dry needling and/or TENS unit however he declined.  Unfortunately due to him not being able to determine diagnostic response to lumbar MBB's we are not able to proceed to radiofrequency ablation.  The patient does not have any radicular component to his pain therefore he is not a candidate for epidural steroid injections.  At this time we do not have anything further to offer the patient and he will contact the office in the future if anything should change.                    Dictated utilizing Dragon dictation.

## 2024-08-27 VITALS
BODY MASS INDEX: 23.46 KG/M2 | TEMPERATURE: 98.7 F | HEIGHT: 73 IN | DIASTOLIC BLOOD PRESSURE: 81 MMHG | HEART RATE: 43 BPM | WEIGHT: 177 LBS | SYSTOLIC BLOOD PRESSURE: 145 MMHG | OXYGEN SATURATION: 99 % | RESPIRATION RATE: 16 BRPM

## 2024-08-27 RX ORDER — LIDOCAINE HYDROCHLORIDE 10 MG/ML
5 INJECTION, SOLUTION EPIDURAL; INFILTRATION; INTRACAUDAL; PERINEURAL ONCE
Status: DISCONTINUED | OUTPATIENT
Start: 2024-08-27 | End: 2024-08-28 | Stop reason: HOSPADM

## 2024-08-27 RX ORDER — BUPIVACAINE HYDROCHLORIDE 5 MG/ML
4 INJECTION, SOLUTION PERINEURAL ONCE
Status: DISCONTINUED | OUTPATIENT
Start: 2024-08-27 | End: 2024-08-28 | Stop reason: HOSPADM

## 2024-09-04 ENCOUNTER — OFFICE VISIT (OUTPATIENT)
Dept: ORTHOPEDIC SURGERY | Facility: CLINIC | Age: 59
End: 2024-09-04
Payer: MEDICAID

## 2024-09-04 VITALS — WEIGHT: 178.3 LBS | HEIGHT: 73 IN | BODY MASS INDEX: 23.63 KG/M2 | TEMPERATURE: 98.6 F

## 2024-09-04 DIAGNOSIS — M25.511 RIGHT SHOULDER PAIN, UNSPECIFIED CHRONICITY: Primary | ICD-10-CM

## 2024-09-04 RX ORDER — METHYLPREDNISOLONE 4 MG
TABLET, DOSE PACK ORAL
Qty: 21 TABLET | Refills: 0 | Status: SHIPPED | OUTPATIENT
Start: 2024-09-04

## 2024-09-04 NOTE — PROGRESS NOTES
Patient:Serafin Hartmann    YOB: 1965    Medical Record Number:[id number]    Chief Complaints:  Right shoulder pain    History of Present Illness:     58 y.o. male patient who presents for his right shoulder.  He tells me he was helping his sister hang a picture about 3 weeks ago.  He had to hold the picture with his arm in an extended position for a lengthy period of time.  He says that he had severe pain in the shoulder the day after.  This severe pain lasted for several weeks.  He says that he was hardly able to move or use his right arm.  Earlier this week, he says the pain started to subside.  Whereas his pain was 10 out of 10 up until this week, he says current pain is 5 out of 10 and seems to be getting better day by day.  When asked to localize his pain, he gestures to the back of his shoulder, the armpit and down along the triceps.  Denies shooting pain into his lower arm or hand.  Denies numbness or tingling.    Allergies   Allergen Reactions    Oxycodone-Acetaminophen Nausea And Vomiting and GI Intolerance       Current Outpatient Medications:     allopurinol (ZYLOPRIM) 100 MG tablet, Take 1 tablet by mouth Daily., Disp: 90 tablet, Rfl: 1    clobetasol (TEMOVATE) 0.05 % external solution, , Disp: , Rfl:     colchicine 0.6 MG tablet, 2 tabs by mouth and 1 tab 1 hr later, 12 hrs later take 1 tab. take 1 tab 1-2 times a day until 2 days after symptoms resolve., Disp: 9 tablet, Rfl: 0    GLUCOSAMINE-CHONDROITIN PO, Take  by mouth., Disp: , Rfl:     ketoconazole (NIZORAL) 2 % shampoo, , Disp: , Rfl:     Multiple Vitamins-Minerals (MULTIVITAMIN ADULT PO), Take 1 tablet by mouth Daily., Disp: , Rfl:     Probiotic Product (PROBIOTIC DAILY PO), Take  by mouth., Disp: , Rfl:     sildenafil (REVATIO) 20 MG tablet, Take 1 to 5 tablets 30 to 60 minutes before sexual activity - start with lower dose and titrate higher as needed, Disp: 90 tablet, Rfl: 0    triamcinolone (KENALOG) 0.1 % ointment, , Disp: ,  Rfl:     valACYclovir (VALTREX) 500 MG tablet, Take 1 tablet by mouth 2 (Two) Times a Day., Disp: 30 tablet, Rfl: 0    Past Medical History:   Diagnosis Date    Allergic     GERD (gastroesophageal reflux disease)     Low back pain     Prostatitis        Past Surgical History:   Procedure Laterality Date    BASAL CELL CARCINOMA EXCISION  03/2023    carcinoma basal cell removal off his nose    COLONOSCOPY N/A 10/13/2020    Procedure: COLONOSCOPY INTO CECUM WITH POLYPECTOMY X 3, CLIP X 1;  Surgeon: Carlito Mcdaniels MD;  Location: [facility] ENDOSCOPY;  Service: General;  Laterality: N/A;  pre: screening  post: colon polyps x 3    CYST REMOVAL Right 11/2023    eyebrow    HAND SURGERY Right     HIP SURGERY      KNEE SURGERY Right     MEDIAL BRANCH BLOCK Right 4/30/2024    Procedure: LUMBAR MEDIAL BRANCH BLOCK RIGHT L3-L5#1 28707, [id number];  Surgeon: Elier Matute MD;  Location: [facility] MAIN OR;  Service: Pain Management;  Laterality: Right;       Social History     Occupational History    Occupation: construction    Tobacco Use    Smoking status: Never     Passive exposure: Never    Smokeless tobacco: Never    Tobacco comments:     daily caffine   Vaping Use    Vaping status: Never Used   Substance and Sexual Activity    Alcohol use: Yes     Comment: OCCASIONALLY     Drug use: Never    Sexual activity: Yes     Partners: Female      Social History     Social History Narrative    Not on file       Family History   Problem Relation Age of Onset    Alzheimer's disease Father     Colon polyps Sister     Heart attack Paternal Uncle     Prostate cancer Neg Hx     Colon cancer Neg Hx     Malig Hyperthermia Neg Hx        Review of Systems:      Constitutional: Denies fever, shaking or chills   Eyes: Denies change in visual acuity   HEENT: Denies nasal congestion or sore throat   Respiratory: Denies cough or shortness of breath   Cardiovascular: Denies chest pain or edema  Endocrine: Denies tremors, palpitations, intolerance  "of heat or cold, polyuria, polydipsia.  GI: Denies abdominal pain, nausea, vomiting, bloody stools or diarrhea  : Denies frequency, urgency, incontinence, retention, or nocturia.  Musculoskeletal: Denies numbness, tingling or loss of motor function except as above  Integument: Denies rash, lesion or ulceration   Neurologic: Denies headache or focal weakness, deficits  Heme: Denies spontaneous or excessive bleeding, epistaxis, hematuria, melena, fatigue, enlarged or tender lymph nodes.      All other pertinent positives and negatives as noted above in HPI.    Physical Exam:58 y.o. male  Vitals:    09/04/24 1601   Temp: 98.6 °F (37 °C)   TempSrc: Temporal   Weight: 80.9 kg (178 lb 4.8 oz)   Height: 185.4 cm (73\")       General:  Patient is awake and alert.  Appears in no acute distress or discomfort.    Psych:  Affect and demeanor are appropriate.    Extremities: Right shoulder is examined.  Skin is benign.  No atrophy, swelling or masses.  No focal areas of tenderness.  He has full symmetric motion.  No apprehension or instability.  He has good strength in his rotator cuff and deltoid including shoulder abduction, forward elevation, internal and external rotation.  Abduction and forward elevation are just mildly uncomfortable for him.    Imaging: AP, scapular Y and x-ray views of the right shoulder are ordered and reviewed evaluate his complaint.  These compared to previous x-rays from last year.  I do not see any concerning findings.  No changes relative to previous films.    Assessment/Plan: Right shoulder pain, suspected tendinitis    I told him that I think it is unlikely he has a tear.  His strength is good and I think tendinitis is the more likely etiology at this point.  He has gotten 50% better since Monday.  We talked about just giving this some more time and seeing if his symptoms do not ultimately resolve.  I expect that will be the case.  Certainly if the symptoms persist or worsen we can always " consider further workup with an MRI.  He says he is fine with giving it some time.  I suggested that a Medrol Dosepak may help quell any remaining inflammation.  He was on board with trying that as well.  Risk of the medicine were discussed.  He acknowledged understanding.  I told her to give me a call in about a week if he is still having pain.    Asher Bansal MD    09/04/2024

## 2024-09-12 ENCOUNTER — OFFICE VISIT (OUTPATIENT)
Dept: INTERNAL MEDICINE | Age: 59
End: 2024-09-12
Payer: MEDICAID

## 2024-09-12 ENCOUNTER — HOSPITAL ENCOUNTER (OUTPATIENT)
Facility: HOSPITAL | Age: 59
Discharge: HOME | End: 2024-09-12
Payer: MEDICAID

## 2024-09-12 VITALS
SYSTOLIC BLOOD PRESSURE: 122 MMHG | WEIGHT: 182 LBS | BODY MASS INDEX: 24.12 KG/M2 | DIASTOLIC BLOOD PRESSURE: 78 MMHG | HEART RATE: 68 BPM | RESPIRATION RATE: 16 BRPM | OXYGEN SATURATION: 97 % | HEIGHT: 73 IN | TEMPERATURE: 97.6 F

## 2024-09-12 DIAGNOSIS — M54.12 RIGHT CERVICAL RADICULOPATHY: Primary | ICD-10-CM

## 2024-09-12 DIAGNOSIS — M54.12 RIGHT CERVICAL RADICULOPATHY: ICD-10-CM

## 2024-09-12 PROCEDURE — 1159F MED LIST DOCD IN RCRD: CPT

## 2024-09-12 PROCEDURE — 1160F RVW MEDS BY RX/DR IN RCRD: CPT

## 2024-09-12 PROCEDURE — 72050 X-RAY EXAM NECK SPINE 4/5VWS: CPT

## 2024-09-12 PROCEDURE — 99213 OFFICE O/P EST LOW 20 MIN: CPT

## 2024-09-12 PROCEDURE — 1125F AMNT PAIN NOTED PAIN PRSNT: CPT

## 2024-09-12 RX ORDER — MELOXICAM 15 MG/1
15 TABLET ORAL DAILY
Qty: 30 TABLET | Refills: 0 | Status: SHIPPED | OUTPATIENT
Start: 2024-09-12

## 2024-09-12 RX ORDER — CYCLOBENZAPRINE HCL 10 MG
10 TABLET ORAL 3 TIMES DAILY PRN
Qty: 30 TABLET | Refills: 0 | Status: SHIPPED | OUTPATIENT
Start: 2024-09-12

## 2024-09-12 NOTE — PROGRESS NOTES
"    I N T E R N A L  M E D I C I N E  [facility]    ENCOUNTER DATE:  09/12/2024    Serafin Caryor / 58 y.o. / male      CHIEF COMPLAINT / REASON FOR OFFICE VISIT     Shoulder Pain      ASSESSMENT & PLAN     Diagnoses and all orders for this visit:    1. Right cervical radiculopathy (Primary)  -     meloxicam (MOBIC) 15 MG tablet; Take 1 tablet by mouth Daily.  Dispense: 30 tablet; Refill: 0  -     cyclobenzaprine (FLEXERIL) 10 MG tablet; Take 1 tablet by mouth 3 (Three) Times a Day As Needed for Muscle Spasms.  Dispense: 30 tablet; Refill: 0  -     XR Spine Cervical Complete 4 or 5 View; Future  -     Ambulatory Referral to Physical Therapy for Evaluation & Treatment         SUMMARY/DISCUSSION  I believe his right sided neck/ posterior shoulder pain symptoms are related to cervical radiculopathy.  He has recently completed steroid dose pack without much benefit.  Start meloxicam.  He is aware to avoid other NSAIDS.  Discussed that muscle relaxer may make him sleepy and to avoid driving.  Recommend cervical XR, with consider for cervical MRI if no improvement in symptoms.  Follow up with PT.  He was also instructed to schedule follow up with ortho, [facility].  He is aware to visit ER for acutely worsening symptoms, neurological changes.        Next Appointment with me: Visit date not found    Return for Next scheduled follow up.      VITAL SIGNS     Visit Vitals  /78   Pulse 68   Temp 97.6 °F (36.4 °C)   Resp 16   Ht 185.4 cm (72.99\")   Wt 82.6 kg (182 lb)   SpO2 97%   BMI 24.02 kg/m²             Wt Readings from Last 3 Encounters:   09/12/24 82.6 kg (182 lb)   09/04/24 80.9 kg (178 lb 4.8 oz)   08/21/24 84.6 kg (186 lb 6.4 oz)     Body mass index is 24.02 kg/m².        MEDICATIONS AT THE TIME OF OFFICE VISIT     Current Outpatient Medications on File Prior to Visit   Medication Sig Dispense Refill    allopurinol (ZYLOPRIM) 100 MG tablet Take 1 tablet by mouth Daily. 90 tablet 1    clobetasol " (TEMOVATE) 0.05 % external solution       colchicine 0.6 MG tablet 2 tabs by mouth and 1 tab 1 hr later, 12 hrs later take 1 tab. take 1 tab 1-2 times a day until 2 days after symptoms resolve. 9 tablet 0    GLUCOSAMINE-CHONDROITIN PO Take  by mouth.      ketoconazole (NIZORAL) 2 % shampoo       Multiple Vitamins-Minerals (MULTIVITAMIN ADULT PO) Take 1 tablet by mouth Daily.      Probiotic Product (PROBIOTIC DAILY PO) Take  by mouth.      sildenafil (REVATIO) 20 MG tablet Take 1 to 5 tablets 30 to 60 minutes before sexual activity - start with lower dose and titrate higher as needed 90 tablet 0    triamcinolone (KENALOG) 0.1 % ointment       valACYclovir (VALTREX) 500 MG tablet Take 1 tablet by mouth 2 (Two) Times a Day. 30 tablet 0    [DISCONTINUED] methylPREDNISolone (Medrol) 4 MG dose pack Take as directed on package instructions (Patient not taking: Reported on 9/12/2024) 21 tablet 0     No current facility-administered medications on file prior to visit.        HISTORY OF PRESENT ILLNESS     Last seen by our office via telehealth on August 1, 2024, for concerns of inferior right knee pain and swelling.  He was concerned about the possibility of gout flair.  Used colchicine at that time with benefit.      Most recently, he saw orthoDr. Asher for a 3 week history of right shoulder pain.  Pain started after he held a picture with outstretched arms in static position.  Shoulder XR reportedly showed no concerning findings.  Patient was treated with steroid dose pack, and today, he continues with pain symptoms.  Reports posterior right cervical/ right trapezius pain with radiation to right tricep and right fingers.  Describes pain as shooting, burning sensation.  Right fingers can tingle at times.  No bowel/ bladder changes, saddle anesthesia.  Previously seen by Elyse lynne APRN for lumbar radiculopathy.  Has tried robaxin in the past without much benefit.      Medical history is significant for  gout, for which he continues on allopurinol 100 mg daily. May 2024 Uric Acid 4.6.       Patient Care Team:  Mae Carter APRN as PCP - General (Family Medicine)    REVIEW OF SYSTEMS     Review of Systems   Constitutional:  Negative for chills, fever and unexpected weight change.   Respiratory:  Negative for cough, chest tightness and shortness of breath.    Cardiovascular:  Negative for chest pain, palpitations and leg swelling.   Musculoskeletal:  Positive for arthralgias and neck pain.   Neurological:  Negative for dizziness, weakness, light-headedness and headaches.   Psychiatric/Behavioral:  The patient is not nervous/anxious.           PHYSICAL EXAMINATION     Physical Exam  Vitals reviewed.   Constitutional:       General: He is not in acute distress.     Appearance: Normal appearance. He is not ill-appearing, toxic-appearing or diaphoretic.   HENT:      Head: Normocephalic and atraumatic.   Cardiovascular:      Rate and Rhythm: Normal rate and regular rhythm.      Heart sounds: Normal heart sounds.   Pulmonary:      Effort: Pulmonary effort is normal.      Breath sounds: Normal breath sounds.   Musculoskeletal:      Cervical back: Tenderness (Right trapezius) present. No bony tenderness. No pain with movement.   Skin:     General: Skin is warm and dry.   Neurological:      Mental Status: He is alert and oriented to person, place, and time. Mental status is at baseline.      Sensory: No sensory deficit.      Motor: No weakness.   Psychiatric:         Mood and Affect: Mood normal.         Behavior: Behavior normal.         Thought Content: Thought content normal.         Judgment: Judgment normal.           REVIEWED DATA     Labs:           Imaging:            Medical Tests:           Summary of old records / correspondence / consultant report:           Request outside records:

## 2024-09-23 ENCOUNTER — DOCUMENTATION (OUTPATIENT)
Dept: PAIN MEDICINE | Facility: CLINIC | Age: 59
End: 2024-09-23
Payer: MEDICAID

## 2024-09-27 ENCOUNTER — OFFICE VISIT (OUTPATIENT)
Dept: ORTHOPEDIC SURGERY | Facility: CLINIC | Age: 59
End: 2024-09-27
Payer: MEDICAID

## 2024-09-27 VITALS — WEIGHT: 185.6 LBS | TEMPERATURE: 97.3 F | HEIGHT: 73 IN | BODY MASS INDEX: 24.6 KG/M2

## 2024-09-27 DIAGNOSIS — M54.12 CERVICAL RADICULOPATHY: Primary | ICD-10-CM

## 2024-09-27 PROCEDURE — 1159F MED LIST DOCD IN RCRD: CPT | Performed by: NURSE PRACTITIONER

## 2024-09-27 PROCEDURE — 1160F RVW MEDS BY RX/DR IN RCRD: CPT | Performed by: NURSE PRACTITIONER

## 2024-09-27 PROCEDURE — 99213 OFFICE O/P EST LOW 20 MIN: CPT | Performed by: NURSE PRACTITIONER

## 2024-10-01 ENCOUNTER — TRANSCRIBE ORDERS (OUTPATIENT)
Dept: ADMINISTRATIVE | Facility: HOSPITAL | Age: 59
End: 2024-10-01
Payer: MEDICAID

## 2024-10-01 DIAGNOSIS — R97.20 ELEVATED PSA: Primary | ICD-10-CM

## 2024-10-07 DIAGNOSIS — M54.12 CERVICAL RADICULOPATHY: Primary | ICD-10-CM

## 2024-10-25 ENCOUNTER — HOSPITAL ENCOUNTER (OUTPATIENT)
Dept: PHYSICAL THERAPY | Facility: HOSPITAL | Age: 59
Discharge: HOME | End: 2024-10-25
Admitting: NURSE PRACTITIONER
Payer: MEDICAID

## 2024-10-25 DIAGNOSIS — R29.3 POOR POSTURE: ICD-10-CM

## 2024-10-25 DIAGNOSIS — R20.0 PAIN AND NUMBNESS OF RIGHT UPPER EXTREMITY: ICD-10-CM

## 2024-10-25 DIAGNOSIS — M54.12 CERVICAL RADICULOPATHY: Primary | ICD-10-CM

## 2024-10-25 DIAGNOSIS — M79.601 PAIN AND NUMBNESS OF RIGHT UPPER EXTREMITY: ICD-10-CM

## 2024-10-25 PROCEDURE — 97110 THERAPEUTIC EXERCISES: CPT

## 2024-10-25 PROCEDURE — 97161 PT EVAL LOW COMPLEX 20 MIN: CPT

## 2024-10-25 NOTE — THERAPY EVALUATION
Outpatient Physical Therapy Ortho Initial Evaluation  [facility]     Patient Name: Serafin Hartmann  : 1965  MRN: [id number]  Today's Date: 10/25/2024      Visit Date: 10/25/2024    Patient Active Problem List   Diagnosis    Primary osteoarthritis of left hip    Status post left hip replacement    Routine adult health maintenance    Colon polyps    Psoriasis of scalp    Ganglion of right wrist    Idiopathic chronic gout of multiple sites with tophus    Atypical mole    Primary osteoarthritis involving multiple joints    DDD (degenerative disc disease), lumbar    Lumbosacral spondylosis without myelopathy    Muscle spasm        Past Medical History:   Diagnosis Date    Allergic     GERD (gastroesophageal reflux disease)     Low back pain     Prostatitis         Past Surgical History:   Procedure Laterality Date    BASAL CELL CARCINOMA EXCISION  2023    carcinoma basal cell removal off his nose    COLONOSCOPY N/A 10/13/2020    Procedure: COLONOSCOPY INTO CECUM WITH POLYPECTOMY X 3, CLIP X 1;  Surgeon: Carlito Mcdaniels MD;  Location: [facility] ENDOSCOPY;  Service: General;  Laterality: N/A;  pre: screening  post: colon polyps x 3    CYST REMOVAL Right 2023    eyebrow    HAND SURGERY Right     HIP SURGERY      KNEE SURGERY Right     MEDIAL BRANCH BLOCK Right 2024    Procedure: LUMBAR MEDIAL BRANCH BLOCK RIGHT L3-L5#1 28412, 35592;  Surgeon: Elier Matute MD;  [place] [facility] [place]  Service: Pain Management;  Laterality: Right;       Visit Dx:     ICD-10-CM ICD-9-CM   1. Cervical radiculopathy  M54.12 723.4   2. Pain and numbness of right upper extremity  M79.601 729.5    R20.0 782.0   3. Poor posture  R29.3 781.92          Patient History       Row Name 10/25/24 1000             History    Chief Complaint Pain  -JS      Type of Pain Neck pain  -JS      Date Current Problem(s) Began 24  -JS      Brief Description of Current Complaint Serafin Hartmann is a 59 y.o. male who presents  to physical therapy today with primary compliant of R sided cervical pain and RUE radicular symptoms that began beginning of August 2024. Patient reports he was holding mirror with his arm in outstretched position the day before onset of symptoms. He denies bowel/ bladder changes, saddle anesthesia or balance changes. Patient declines pain rating but describes his pain as N/T into digits 1-4 numbness with intermittent radiating/sharp pain localized to C/T junction. Serafin Hartmann reports difficulty/increased pain with sitting in vehicle while driving, performing cervical flexion/extension/R rotation/SB with intermittent popping sensation. He expresses his pain progressively worsens throughout the day and at its lowest first thing in the AM. Pain relieving factors include sitting in relaxed position. PMH includes chronic LBP.  -JS      Previous treatment for THIS PROBLEM Medication  -JS      Patient/Caregiver Goals Relieve pain;Return to prior level of function;Improve mobility;Improve strength;Know what to do to help the symptoms  -JS      Hand Dominance right-handed  -JS      What clinical tests have you had for this problem? X-ray  -JS      Results of Clinical Tests see epic  -JS         Pain     Pain Location Neck  declines reporting pain levels  -JS      Pain Description Pins and needles;Tingling;Numbness  -JS      Is your sleep disturbed? No  -JS         Fall Risk Assessment    Any falls in the past year: No  -JS         Services    Prior Rehab/Home Health Experiences No  -JS         Daily Activities    Primary Language English  -JS      How does patient learn best? Reading;Demonstration;Pictures/Video  -JS      Barriers to learning None  -JS      Pt Participated in POC and Goals Yes  -JS         Safety    Are you being hurt, hit, or frightened by anyone at home or in your life? No  -JS      Are you being neglected by a caregiver No  -JS      Have you had any of the following issues with N/A  -JS                 User Key  (r) = Recorded By, (t) = Taken By, (c) = Cosigned By      Initials Name Provider Type    JS Maria A Luque, PT Physical Therapist                     PT Ortho       Row Name 10/25/24 1000       Subjective    Subjective Comments eval  -JS       Posture/Observations    Alignment Options Thoracic kyphosis;Lumbar lordosis;Forward head;Cervical lordosis;Rounded shoulders  -JS    Forward Head Moderate  -JS    Cervical Lordosis Moderate;Increased  -JS    Thoracic Kyphosis Moderate  -JS    Rounded Shoulders Moderate;Increased  -JS    Lumbar lordosis Mild  -JS       Quarter Clearing    Quarter Clearing Upper Quarter Clearing  -JS       Sensory Screen for Light Touch- Upper Quarter Clearing    C6 (tip of thumb) Right:;Diminished  -JS    C7 (tip of 3rd finger) Right:;Diminished  -JS    C8 (tip of 5th finger) Right:;Diminished  -JS    T1 (medial lower arm) Right:;Diminished  -JS       Myotomal Screen- Upper Quarter Clearing    Shoulder flexion (C5) Bilateral:;4+ (Good +)  -JS    Elbow flexion/wrist extension (C6) Right:;4+ (Good +);Left:;5 (Normal)  -JS    Elbow extension/wrist flexion (C7) Right:;4+ (Good +);Left:;5 (Normal)  -JS       Cervical/Shoulder ROM Screen    Cervical flexion Impaired  45 deg  -JS    Cervical extension Impaired  25 deg, pain inc  -JS    Cervical lateral flexion Impaired  32 deg R (pain inc), 30 deg L  -JS    Cervical rotation Impaired  L 80 deg, R 70 deg (inc neck symptoms)  -JS       Special Tests/Palpation    Special Tests/Palpation Cervical/Thoracic  -JS       Cervical Palpation    Cervical Palpation- Location? Suboccipital;Levator scapula;Upper traps  -JS    Suboccipital Bilateral:;Tender  -JS    Levator Scapula Right:;Tender;Guarded/taut;Trigger point  -JS    Upper Traps Right:;Tender;Guarded/taut;Trigger point  -JS       Cervical Accessory Motions    Cervical Accessory Motions Tested? Yes  -JS    Sideglide- C5 Right:;Hypermobile;Right pain  -JS    Sideglide- C6  Right:;Hypermobile;Right pain  -JS    Sideglide- C7 Right:;Hypermobile;Right pain  -JS    PA glide- C5 Right:;Hypermobile;Right pain  -JS    PA glide- C6 Right:;Hypermobile;Right pain  -JS    PA glide- C7 Right:;Hypermobile;Right pain  -JS       Thoracic Accessory Motions    Thoracic Accessory Motions Tested? Yes  -JS    Pa glide- Upper thoracic Hypomobile  -JS    Pa glide- Middle thoracic Hypomobile  -JS       General ROM    GENERAL ROM COMMENTS B shoulder AROM WNL  -JS        Strength Right    # Reps 3  -JS    Right Rung 2  -JS    Right  Test 1 86  -JS    Right  Test 2 80  -JS    Right  Test 3 75  -JS     Strength Average Right 80.33  -JS        Strength Left    # Reps 3  -JS    Left Rung 2  -JS    Left  Test 1 92  -JS    Left  Test 2 78  -JS    Left  Test 3 85  -JS     Strength Average Left 85  -JS       Flexibility    Flexibility Tested? Upper Extremity  -JS       Hand  Strength     Strength Affected Side Bilateral  -JS              User Key  (r) = Recorded By, (t) = Taken By, (c) = Cosigned By      Initials Name Provider Type    Maria A Chan, PT Physical Therapist                                Therapy Education  Education Details: Educated on anatomy/pathology, evaluation findings, therapy expectations, importance of postural awareness, potential benefit of mechanical traction vs DDN, POC  Given: HEP, Symptoms/condition management, Pain management, Posture/body mechanics  Program: New  How Provided: Verbal, Demonstration  Provided to: Patient  Level of Understanding: Verbalized, Demonstrated  71935 - PT Self Care/Mgmt Minutes: 5      PT OP Goals       Row Name 10/25/24 0900          PT Short Term Goals    STG Date to Achieve 11/24/24  -JS     STG 1 Pt will be independent with initial HEP and postural awareness to improve pain and symptoms.  -JS     STG 1 Progress New  -     STG 2 Pt to be educated in/verbalize understanding of the importance of  posture/ergonomics in association with their condition to facilitate self management of their condition  -JS     STG 2 Progress New  -JS     STG 3 Pt. Will report 25% reduction in instances of numbness/tingling into R UE to improve QOL.  -JS     STG 3 Progress New  -JS        Long Term Goals    LTG Date to Achieve 12/24/24  -JS     LTG 1 Pt will be independent with advance HEP and postural awareness for continued management of pain and symptoms.  -JS     LTG 1 Progress New  -JS     LTG 2 Pt. Will report 50% reduction in instances of numbness/tingling into R UE to improve QOL.  -JS     LTG 2 Progress New  -JS     LTG 3 Pt demonstrateds 5/5 bilateral shoulder and scapular strength without reproduction of cervical and RUE symptoms for greater ease of lifting tasks.  -JS     LTG 3 Progress New  -JS     LTG 4 Progress --  -JS        Time Calculation    PT Goal Re-Cert Due Date 01/23/25  -JS               User Key  (r) = Recorded By, (t) = Taken By, (c) = Cosigned By      Initials Name Provider Type    Maria A Chan, PT Physical Therapist                     PT Assessment/Plan       Row Name 10/25/24 1100          PT Assessment    Functional Limitations Limitations in community activities;Performance in leisure activities;Limitation in home management;Limitations in functional capacity and performance;Performance in self-care ADL  -     Impairments Impaired flexibility;Impaired muscle length;Impaired muscle power;Impaired postural alignment;Joint mobility;Muscle strength;Pain;Peripheral nerve integrity;Poor body mechanics;Posture;Range of motion;Sensation  -     Assessment Comments Serafin Hartmann is a 59 y.o. male referred to physical therapy for  R sided cervical pain and RUE radicular symptoms that began beginning of August 2024. Patient reports he was holding mirror with his arm in outstretched position the day before onset of symptoms. He denies bowel/ bladder changes, saddle anesthesia or balance changes.  Patient declines pain rating but describes his pain as N/T into digits 1-4 numbness with intermittent radiating/sharp pain localized to C/T junction.. He presents with an evolving clinical presentation, along with  comorbidities of chronic LBP and personal factors of poor posture  that may impact his progress in the plan of care. Pt presents today with resting posture of forward head position, inc cervical lordosis and rounded shoulders. He also presents with radicular referral and dec sensation of C6-T1 dermatome and decreased UE strength. Patient without improvement in symptoms during seated cervical distraction. Positive Spurlings test and hypomobile lower cervical facet mobility . His signs and symptoms are consistent with referring diagnosis. Pt will benefit from skilled PT to address the previous impairments and return to PLOF.  -JS     Please refer to paper survey for additional self-reported information Yes  -JS     Rehab Potential Good  -JS     Patient/caregiver participated in establishment of treatment plan and goals Yes  -JS     Patient would benefit from skilled therapy intervention Yes  -JS        PT Plan    PT Frequency 2x/week  -JS     Predicted Duration of Therapy Intervention (PT) 10-12 visits  -JS     Planned CPT's? PT EVAL LOW COMPLEXITY: 10088;PT RE-EVAL: 31299;PT THER PROC EA 15 MIN: 49776;PT THER ACT EA 15 MIN: 26079;PT MANUAL THERAPY EA 15 MIN: 40581;PT NEUROMUSC RE-EDUCATION EA 15 MIN: 32767;PT SELF CARE/HOME MGMT/TRAIN EA 15: 16093;PT HOT OR COLD PACK TREAT MCARE;PT ELECTRICAL STIM UNATTEND: ;PT TRACTION CERVICAL: 21412  -JS     Physical Therapy Interventions (Optional Details) dry needling  -JS     PT Plan Comments response to eval, warm up on UBE, pt requires two pillows in supine position, consider scapular retraction, seated chin tuck, shoulder row/ext, supine SAP, SL thoracic rotation, attempt manual - distraction/joint glides, trail traction as needed  -JS               User Key   (r) = Recorded By, (t) = Taken By, (c) = Cosigned By      Initials Name Provider Type    Maria A Chan, PT Physical Therapist                       OP Exercises       Row Name 10/25/24 1000             Subjective    Subjective Comments eval  -JS         Total Minutes    38329 - PT Therapeutic Exercise Minutes 5  -JS         Exercise 1    Exercise Name 1 UBE  -JS      Additional Comments next visit  -JS         Exercise 2    Exercise Name 2 supine chin tuck  -JS      Cueing 2 Verbal;Tactile  -JS      Sets 2 1  -JS      Reps 2 5  -JS      Additional Comments patient reports inc pain  -JS                User Key  (r) = Recorded By, (t) = Taken By, (c) = Cosigned By      Initials Name Provider Type    JS Maria A Luque, PT Physical Therapist                                  Outcome Measure Options:  (pt did not fill out entire survey and declines raiting pain. (NDI and QuickDASH))         Time Calculation:     Start Time: 1016  Stop Time: 1100  Time Calculation (min): 44 min  Timed Charges  09932 - PT Therapeutic Exercise Minutes: 5  71377 - PT Self Care/Mgmt Minutes: 5  Untimed Charges  PT Eval/Re-eval Minutes: 34  Total Minutes  Timed Charges Total Minutes: 10  Untimed Charges Total Minutes: 34   Total Minutes: 44     Therapy Charges for Today       Code Description Service Date Service Provider Modifiers Qty    83649928946 HC PT EVAL LOW COMPLEXITY 3 10/25/2024 Maria A Luque, PT GP 1            PT G-Codes  Outcome Measure Options:  (pt did not fill out entire survey and declines raiting pain. (NDI and QuickDASH))         Maria A Luque PT  10/25/2024

## 2024-11-04 ENCOUNTER — HOSPITAL ENCOUNTER (OUTPATIENT)
Dept: MRI IMAGING | Facility: HOSPITAL | Age: 59
Discharge: HOME | End: 2024-11-04
Admitting: NURSE PRACTITIONER
Payer: MEDICAID

## 2024-11-04 DIAGNOSIS — M54.12 CERVICAL RADICULOPATHY: ICD-10-CM

## 2024-11-04 PROCEDURE — 72141 MRI NECK SPINE W/O DYE: CPT

## 2024-11-04 NOTE — PROGRESS NOTES
Please let him know the MRI does not show any disc herniations but he does have a bone spur contributing to right greater than left nerve root impingement between the sixth and seventh bone that I do think explain his symptoms.  Proceed with physical therapy like we discussed.  If he wants to try epidurals as well, we can refer him to pain management.

## 2024-11-05 ENCOUNTER — HOSPITAL ENCOUNTER (OUTPATIENT)
Facility: HOSPITAL | Age: 59
Discharge: HOME | End: 2024-11-05
Admitting: UROLOGY
Payer: MEDICAID

## 2024-11-05 DIAGNOSIS — R97.20 ELEVATED PSA: ICD-10-CM

## 2024-11-05 PROCEDURE — 72197 MRI PELVIS W/O & W/DYE: CPT

## 2024-11-05 PROCEDURE — 0 GADOBENATE DIMEGLUMINE 529 MG/ML SOLUTION: Performed by: UROLOGY

## 2024-11-05 PROCEDURE — A9577 INJ MULTIHANCE: HCPCS | Performed by: UROLOGY

## 2024-11-05 RX ADMIN — GADOBENATE DIMEGLUMINE 16 ML: 529 INJECTION, SOLUTION INTRAVENOUS at 15:02

## 2024-11-06 ENCOUNTER — HOSPITAL ENCOUNTER (OUTPATIENT)
Dept: PHYSICAL THERAPY | Facility: HOSPITAL | Age: 59
Setting detail: THERAPIES SERIES
Discharge: HOME | End: 2024-11-06
Payer: MEDICAID

## 2024-11-06 DIAGNOSIS — R29.3 POOR POSTURE: ICD-10-CM

## 2024-11-06 DIAGNOSIS — M54.12 CERVICAL RADICULOPATHY: Primary | ICD-10-CM

## 2024-11-06 DIAGNOSIS — M79.601 PAIN AND NUMBNESS OF RIGHT UPPER EXTREMITY: ICD-10-CM

## 2024-11-06 DIAGNOSIS — R20.0 PAIN AND NUMBNESS OF RIGHT UPPER EXTREMITY: ICD-10-CM

## 2024-11-06 PROCEDURE — 97140 MANUAL THERAPY 1/> REGIONS: CPT

## 2024-11-06 PROCEDURE — 97110 THERAPEUTIC EXERCISES: CPT

## 2024-11-06 NOTE — THERAPY TREATMENT NOTE
Outpatient Physical Therapy Ortho Treatment Note  [facility]     Patient Name: Serafin Hartmann  : 1965  MRN: [id number]  Today's Date: 2024      Visit Date: 2024    Visit Dx:    ICD-10-CM ICD-9-CM   1. Cervical radiculopathy  M54.12 723.4   2. Pain and numbness of right upper extremity  M79.601 729.5    R20.0 782.0   3. Poor posture  R29.3 781.92       Patient Active Problem List   Diagnosis    Primary osteoarthritis of left hip    Status post left hip replacement    Routine adult health maintenance    Colon polyps    Psoriasis of scalp    Ganglion of right wrist    Idiopathic chronic gout of multiple sites with tophus    Atypical mole    Primary osteoarthritis involving multiple joints    DDD (degenerative disc disease), lumbar    Lumbosacral spondylosis without myelopathy    Muscle spasm        Past Medical History:   Diagnosis Date    Allergic     GERD (gastroesophageal reflux disease)     Low back pain     Prostatitis         Past Surgical History:   Procedure Laterality Date    BASAL CELL CARCINOMA EXCISION  2023    carcinoma basal cell removal off his nose    COLONOSCOPY N/A 10/13/2020    Procedure: COLONOSCOPY INTO CECUM WITH POLYPECTOMY X 3, CLIP X 1;  Surgeon: Carlito Mcdaniels MD;  Location: [facility] ENDOSCOPY;  Service: General;  Laterality: N/A;  pre: screening  post: colon polyps x 3    CYST REMOVAL Right 2023    eyebrow    HAND SURGERY Right     HIP SURGERY      KNEE SURGERY Right     MEDIAL BRANCH BLOCK Right 2024    Procedure: LUMBAR MEDIAL BRANCH BLOCK RIGHT L3-L5#1 87511, 62168;  Surgeon: Elier Matute MD;  [place] [facility] [place]  Service: Pain Management;  Laterality: Right;                        PT Assessment/Plan       Row Name 24 0900          PT Assessment    Assessment Comments Mr. Hartmann returns to PT for first f/u after initial evaluation for cervical pain and RUE radicular referral. See epic for recent MRI results. Reviewed results in  detail and discussed potential benefit of KARIN vs steroid dose pack. Patient verbalized understanding. Patient with limited tolerance throughout session with primary complaint involving localized cervical pian at C/T junction, R scapular pain (LS insertion) and N/T referral into RUE along median nerve distribution. Ended session with manual therapy and patient reports no change in symptoms with cervical distraction and mild improvement in N/T of R hand with STM to R LS. Updated HEP, reviewed changes with patient and provided handout. Serafin Hartmann remains a candidate for skilled PT.  -JS        PT Plan    PT Plan Comments response to last session, pt denies providing numerical pain rating, consider hold on UBE (pt with inc symptoms last session), pt requires two pillows in supine position, shoulder row/ext?, supine SAP, SL thoracic rotation, consider UT stretch, median nerve glide  -JS               User Key  (r) = Recorded By, (t) = Taken By, (c) = Cosigned By      Initials Name Provider Type    Maria A Chan, PT Physical Therapist                       OP Exercises       Row Name 11/06/24 0700             Subjective    Subjective Comments no change in symptoms  -JS         Subjective Pain    Subjective Pain Comment patient declines providing pain level  -JS         Total Minutes    39099 - PT Therapeutic Exercise Minutes 25  -JS      26002 - PT Manual Therapy Minutes 15  -JS         Exercise 1    Exercise Name 1 UBE  -JS      Time 1 4 mins  -JS         Exercise 3    Exercise Name 3 seated scapular retraction  -JS      Cueing 3 Verbal;Demo  -JS      Sets 3 2  -JS      Reps 3 10  -JS      Time 3 second set RUE resting on pillow  -JS         Exercise 4    Exercise Name 4 posterior shoulder rolls  -JS      Cueing 4 Verbal;Demo  -JS      Reps 4 20  -JS         Exercise 5    Exercise Name 5 seated chin tucks  -JS      Cueing 5 Verbal;Demo  -JS      Sets 5 1  -JS      Reps 5 10  -JS      Time 5 5s  -JS       Additional Comments heavy cues for form  -JS         Exercise 6    Exercise Name 6 seated LS stretch  -JS      Cueing 6 Verbal;Tactile;Demo  -JS      Reps 6 3  -JS      Time 6 20s  -JS                User Key  (r) = Recorded By, (t) = Taken By, (c) = Cosigned By      Initials Name Provider Type    Maria A Chan, PT Physical Therapist                             Manual Rx (Last 36 Hours)       Manual Treatments       Row Name 11/06/24 0700             Total Minutes    17505 - PT Manual Therapy Minutes 15  -JS         Manual Rx 1    Manual Rx 1 Location cervical distraction/traction  -JS         Manual Rx 2    Manual Rx 2 Location STM R UT/LS  -JS                User Key  (r) = Recorded By, (t) = Taken By, (c) = Cosigned By      Initials Name Provider Type    Maria A Chan, PT Physical Therapist                     PT OP Goals       Row Name 11/06/24 0700          PT Short Term Goals    STG Date to Achieve 11/24/24  -     STG 1 Pt will be independent with initial HEP and postural awareness to improve pain and symptoms.  -     STG 1 Progress Ongoing  -     STG 2 Pt to be educated in/verbalize understanding of the importance of posture/ergonomics in association with their condition to facilitate self management of their condition  -     STG 2 Progress Ongoing  -     STG 3 Pt. Will report 25% reduction in instances of numbness/tingling into R UE to improve QOL.  -     STG 3 Progress Ongoing  -        Long Term Goals    LTG Date to Achieve 12/24/24  -     LTG 1 Pt will be independent with advance HEP and postural awareness for continued management of pain and symptoms.  -     LTG 1 Progress Ongoing  -     LTG 2 Pt. Will report 50% reduction in instances of numbness/tingling into R UE to improve QOL.  -     LTG 2 Progress Ongoing  -     LTG 3 Pt demonstrateds 5/5 bilateral shoulder and scapular strength without reproduction of cervical and RUE symptoms for greater ease of lifting  tasks.  -TONI     LTG 3 Progress Ongoing  -TONI               User Key  (r) = Recorded By, (t) = Taken By, (c) = Cosigned By      Initials Name Provider Type    Maria A Chan PT Physical Therapist                    Therapy Education  Education Details: updated HEP  Given: HEP, Symptoms/condition management, Pain management, Posture/body mechanics  Program: Reinforced, Progressed  How Provided: Verbal, Demonstration, Written  Provided to: Patient  Level of Understanding: Verbalized, Demonstrated              Time Calculation:   Start Time: 0745  Stop Time: 0825  Time Calculation (min): 40 min  Timed Charges  26426 - PT Therapeutic Exercise Minutes: 25  45028 - PT Manual Therapy Minutes: 15  Total Minutes  Timed Charges Total Minutes: 40   Total Minutes: 40  Therapy Charges for Today       Code Description Service Date Service Provider Modifiers Qty    84883860805  PT THER PROC EA 15 MIN 11/6/2024 Maria A Luque, PT GP 2    [id number]  PT MANUAL THERAPY EA 15 MIN 11/6/2024 Maria A Luque, PT GP 1                      Maria A Luque PT  11/6/2024

## 2024-11-12 DIAGNOSIS — M10.9 GOUT OF MULTIPLE SITES, UNSPECIFIED CAUSE, UNSPECIFIED CHRONICITY: ICD-10-CM

## 2024-11-12 RX ORDER — ALLOPURINOL 100 MG/1
100 TABLET ORAL DAILY
Qty: 90 TABLET | Refills: 1 | Status: SHIPPED | OUTPATIENT
Start: 2024-11-12

## 2024-11-14 ENCOUNTER — HOSPITAL ENCOUNTER (OUTPATIENT)
Dept: PHYSICAL THERAPY | Facility: HOSPITAL | Age: 59
Setting detail: THERAPIES SERIES
Discharge: HOME | End: 2024-11-14
Payer: MEDICAID

## 2024-11-14 DIAGNOSIS — M79.601 PAIN AND NUMBNESS OF RIGHT UPPER EXTREMITY: ICD-10-CM

## 2024-11-14 DIAGNOSIS — R20.0 PAIN AND NUMBNESS OF RIGHT UPPER EXTREMITY: ICD-10-CM

## 2024-11-14 DIAGNOSIS — R29.3 POOR POSTURE: ICD-10-CM

## 2024-11-14 DIAGNOSIS — M54.12 CERVICAL RADICULOPATHY: Primary | ICD-10-CM

## 2024-11-14 PROCEDURE — 97140 MANUAL THERAPY 1/> REGIONS: CPT

## 2024-11-14 PROCEDURE — 97110 THERAPEUTIC EXERCISES: CPT

## 2024-11-14 NOTE — THERAPY TREATMENT NOTE
Outpatient Physical Therapy Ortho Treatment Note  [facility]     Patient Name: Serafin Hartmann  : 1965  MRN: [id number]  Today's Date: 2024      Visit Date: 2024    Visit Dx:    ICD-10-CM ICD-9-CM   1. Cervical radiculopathy  M54.12 723.4   2. Pain and numbness of right upper extremity  M79.601 729.5    R20.0 782.0   3. Poor posture  R29.3 781.92       Patient Active Problem List   Diagnosis    Primary osteoarthritis of left hip    Status post left hip replacement    Routine adult health maintenance    Colon polyps    Psoriasis of scalp    Ganglion of right wrist    Idiopathic chronic gout of multiple sites with tophus    Atypical mole    Primary osteoarthritis involving multiple joints    DDD (degenerative disc disease), lumbar    Lumbosacral spondylosis without myelopathy    Muscle spasm        Past Medical History:   Diagnosis Date    Allergic     GERD (gastroesophageal reflux disease)     Low back pain     Prostatitis         Past Surgical History:   Procedure Laterality Date    BASAL CELL CARCINOMA EXCISION  2023    carcinoma basal cell removal off his nose    COLONOSCOPY N/A 10/13/2020    Procedure: COLONOSCOPY INTO CECUM WITH POLYPECTOMY X 3, CLIP X 1;  Surgeon: Carlito Mcdaniels MD;  Location: [facility] ENDOSCOPY;  Service: General;  Laterality: N/A;  pre: screening  post: colon polyps x 3    CYST REMOVAL Right 2023    eyebrow    HAND SURGERY Right     HIP SURGERY      KNEE SURGERY Right     MEDIAL BRANCH BLOCK Right 2024    Procedure: LUMBAR MEDIAL BRANCH BLOCK RIGHT L3-L5#1 35908, 03757;  Surgeon: Elier Matute MD;  [place] [facility] [place];  Service: Pain Management;  Laterality: Right;                        PT Assessment/Plan       Row Name 24 1200          PT Assessment    Assessment Comments Serafin returns to PT this date reporting some mild relief following manual interventions at last session, no full alleviation in pain however some slight reduction  "in severity of morning symptoms. Continued with manual this date with addition of tspine mobility work, does have reproduction with PA mobilizations, as well as added first rib mobilizations and gentle pec stretch. He remains significant taught through anterior chest musculature as well as demos signs consistent with brachial plexus compression and global inflammatory response. Added open books to further stress mobility as well as added median nerve glides for neural tension. Discussion on appropriate exercise completion frequency, will assess tolerance at next session and progress as appropriate.  -MO        PT Plan    PT Plan Comments transition to supine UT stretch, Supine SA punch, \"T\" stretch over noodle  -MO               User Key  (r) = Recorded By, (t) = Taken By, (c) = Cosigned By      Initials Name Provider Type    Beryl Aranda, PT Physical Therapist                       OP Exercises       Row Name 11/14/24 0900             Subjective    Subjective Comments Do feel like there was a little improvement after what she did  -MO         Total Minutes    33104 - PT Therapeutic Exercise Minutes 23  -MO      63287 - PT Manual Therapy Minutes 18  -MO         Exercise 1    Exercise Name 1 UBE  -MO      Time 1 held this date  -MO         Exercise 2    Exercise Name 2 supine chin tuck  -MO      Cueing 2 Verbal  -MO      Sets 2 1  -MO      Reps 2 10  -MO         Exercise 5    Exercise Name 5 seated chin tucks  -MO      Cueing 5 Verbal  -MO      Sets 5 1  -MO      Reps 5 5  -MO      Additional Comments heavy cues for form  -MO         Exercise 7    Exercise Name 7 supine median nerve glide  -MO      Cueing 7 Verbal  -MO      Sets 7 2R  -MO      Reps 7 10  -MO         Exercise 8    Exercise Name 8 s/l open book  -MO      Cueing 8 Verbal  -MO      Sets 8 1B  -MO      Reps 8 12  -MO      Time 8 cueing for additional cspine rotation  -MO         Exercise 9    Exercise Name 9 --  -MO                User Key  (r) = " Recorded By, (t) = Taken By, (c) = Cosigned By      Initials Name Provider Type    Beryl Aranda, PT Physical Therapist                             Manual Rx (Last 36 Hours)       Manual Treatments       Row Name 11/14/24 0900             Total Minutes    30726 - PT Manual Therapy Minutes 18  -MO         Manual Rx 1    Manual Rx 1 Location cervical distraction/traction  -MO      Manual Rx 1 Type 1st rib mob in prone and supine  -MO         Manual Rx 2    Manual Rx 2 Location STM R UT/LS  -MO      Manual Rx 2 Type sustained holds with soft tissue massage  -MO         Manual Rx 3    Manual Rx 3 Location t spine  -MO      Manual Rx 3 Type unilateral and bilateral PA mobs, gross rib mobs  -MO                User Key  (r) = Recorded By, (t) = Taken By, (c) = Cosigned By      Initials Name Provider Type    Beryl Aranda PT Physical Therapist                                       Time Calculation:   Start Time: 0900  Stop Time: 0941  Time Calculation (min): 41 min  Timed Charges  88580 - PT Therapeutic Exercise Minutes: 23  91756 - PT Manual Therapy Minutes: 18  Total Minutes  Timed Charges Total Minutes: 41   Total Minutes: 41  Therapy Charges for Today       Code Description Service Date Service Provider Modifiers Qty    85678075479 HC PT THER PROC EA 15 MIN 11/14/2024 Beryl Barlow, PT GP 2    27554709700 HC PT MANUAL THERAPY EA 15 MIN 11/14/2024 Beryl Barlow, PT GP 1                      Beryl Barlow PT  11/14/2024

## 2024-11-19 ENCOUNTER — HOSPITAL ENCOUNTER (OUTPATIENT)
Dept: PHYSICAL THERAPY | Facility: HOSPITAL | Age: 59
Setting detail: THERAPIES SERIES
Discharge: HOME | End: 2024-11-19
Payer: MEDICAID

## 2024-11-19 ENCOUNTER — PATIENT MESSAGE (OUTPATIENT)
Dept: ORTHOPEDIC SURGERY | Facility: CLINIC | Age: 59
End: 2024-11-19
Payer: MEDICAID

## 2024-11-19 DIAGNOSIS — M54.12 CERVICAL RADICULOPATHY: Primary | ICD-10-CM

## 2024-11-19 DIAGNOSIS — R29.3 POOR POSTURE: ICD-10-CM

## 2024-11-19 DIAGNOSIS — M54.16 LUMBAR RADICULOPATHY: ICD-10-CM

## 2024-11-19 DIAGNOSIS — M79.601 PAIN AND NUMBNESS OF RIGHT UPPER EXTREMITY: ICD-10-CM

## 2024-11-19 DIAGNOSIS — R20.0 PAIN AND NUMBNESS OF RIGHT UPPER EXTREMITY: ICD-10-CM

## 2024-11-19 PROCEDURE — 97140 MANUAL THERAPY 1/> REGIONS: CPT

## 2024-11-19 PROCEDURE — 97110 THERAPEUTIC EXERCISES: CPT

## 2024-11-19 NOTE — THERAPY TREATMENT NOTE
Outpatient Physical Therapy Ortho Treatment Note  [facility]     Patient Name: Serafin Hartmann  : 1965  MRN: [id number]  Today's Date: 2024      Visit Date: 2024    Visit Dx:    ICD-10-CM ICD-9-CM   1. Cervical radiculopathy  M54.12 723.4   2. Pain and numbness of right upper extremity  M79.601 729.5    R20.0 782.0   3. Poor posture  R29.3 781.92       Patient Active Problem List   Diagnosis    Primary osteoarthritis of left hip    Status post left hip replacement    Routine adult health maintenance    Colon polyps    Psoriasis of scalp    Ganglion of right wrist    Idiopathic chronic gout of multiple sites with tophus    Atypical mole    Primary osteoarthritis involving multiple joints    DDD (degenerative disc disease), lumbar    Lumbosacral spondylosis without myelopathy    Muscle spasm        Past Medical History:   Diagnosis Date    Allergic     GERD (gastroesophageal reflux disease)     Low back pain     Prostatitis         Past Surgical History:   Procedure Laterality Date    BASAL CELL CARCINOMA EXCISION  2023    carcinoma basal cell removal off his nose    COLONOSCOPY N/A 10/13/2020    Procedure: COLONOSCOPY INTO CECUM WITH POLYPECTOMY X 3, CLIP X 1;  Surgeon: Carlito Mcdaniels MD;  Location: [facility] ENDOSCOPY;  Service: General;  Laterality: N/A;  pre: screening  post: colon polyps x 3    CYST REMOVAL Right 2023    eyebrow    HAND SURGERY Right     HIP SURGERY      KNEE SURGERY Right     MEDIAL BRANCH BLOCK Right 2024    Procedure: LUMBAR MEDIAL BRANCH BLOCK RIGHT L3-L5#1 28276, 58644;  Surgeon: Elier Matute MD;  Location: [facility] [place];  Service: Pain Management;  Laterality: Right;                        PT Assessment/Plan       Row Name 24 1100          PT Assessment    Assessment Comments Serafin returns to PT denying any increase in symptoms or pain following last session, overall condition remains unchanged. Continued with heavy emphasis on manual to  "R UT/ LS as well as added supine exercises over foam roller for sustained anterior chest stretching as well as relative T spine extension. Added sustained \"T\" stretch, supine SA punch, and straight arm theraloop flexion. He demos notable challenge with SA lowering control and overall difficultly with dorsal scap activation. Discussed modifying HEP to complete seated exercises in supine for the next several days to limit UT and anterior cspine musculature over activation as well as limiting significant pain increased positions.  -MO        PT Plan    PT Plan Comments hold on tspine manual- continue with R UT manual. Add supine HA (likely green band) and B ER. Prone Row- ensure no UT overactivation. Resisted horizontal wall walks- limit excessive forward head  -MO               User Key  (r) = Recorded By, (t) = Taken By, (c) = Cosigned By      Initials Name Provider Type    Beryl Aranda, PT Physical Therapist                       OP Exercises       Row Name 11/19/24 1100             Subjective    Subjective Comments The same  -MO         Subjective Pain    Able to rate subjective pain? no  -MO         Total Minutes    95717 - PT Therapeutic Exercise Minutes 25  -MO      12992 - PT Manual Therapy Minutes 15  -MO         Exercise 2    Exercise Name 2 supine chin tuck  -MO      Cueing 2 Verbal  -MO      Sets 2 1  -MO      Reps 2 20  -MO      Time 2 2s  -MO      Additional Comments copious cueing for pain free, minimal range  -MO         Exercise 4    Exercise Name 4 supine straight arm theraloop flexion  -MO      Cueing 4 Verbal;Demo  -MO      Sets 4 2  -MO      Reps 4 10  -MO      Time 4 YTB  -MO         Exercise 6    Exercise Name 6 Supine SA punch  -MO      Cueing 6 Verbal  -MO      Sets 6 2  -MO      Reps 6 10  -MO      Time 6 3# DB  -MO         Exercise 9    Exercise Name 9 Supine \"T\" over foam roller  -MO      Cueing 9 Verbal;Demo  -MO      Sets 9 5  -MO      Reps 9 20s  -MO                User Key  (r) = " Recorded By, (t) = Taken By, (c) = Cosigned By      Initials Name Provider Type    Beryl Aranda, PT Physical Therapist                             Manual Rx (Last 36 Hours)       Manual Treatments       Row Name 11/19/24 1100             Total Minutes    69186 - PT Manual Therapy Minutes 15  -MO         Manual Rx 2    Manual Rx 2 Location STM R UT/LS  -MO      Manual Rx 2 Type sustained holds with soft tissue massage  -MO         Manual Rx 3    Manual Rx 3 Location t spine  -MO      Manual Rx 3 Type unilateral and bilateral PA mobs, gross rib mobs  -MO                User Key  (r) = Recorded By, (t) = Taken By, (c) = Cosigned By      Initials Name Provider Type    Beryl Aranda PT Physical Therapist                        Therapy Education  Education Details: Education throughout session on likely contributing factors to ongoing symptoms including thoracic tightness, poor dorsal scap strength, tight anterior chest and UT/LS musculature in relation to nerve compression. Additionally reviewed reproduction of symptoms during stretches and exercises, reinforcing approrpaite symptom reproduction vs causing increasaed pain.              Time Calculation:   Start Time: 1050  Stop Time: 1130  Time Calculation (min): 40 min  Timed Charges  81273 - PT Therapeutic Exercise Minutes: 25  12451 - PT Manual Therapy Minutes: 15  Total Minutes  Timed Charges Total Minutes: 40   Total Minutes: 40  Therapy Charges for Today       Code Description Service Date Service Provider Modifiers Qty    86204559409  PT THER PROC EA 15 MIN 11/19/2024 Beryl Barlow, PT GP 2    [id number]  PT MANUAL THERAPY EA 15 MIN 11/19/2024 Beryl Barlow, PT GP 1                      Beryl Barlow PT  11/19/2024

## 2024-11-21 ENCOUNTER — HOSPITAL ENCOUNTER (OUTPATIENT)
Dept: PHYSICAL THERAPY | Facility: HOSPITAL | Age: 59
Setting detail: THERAPIES SERIES
Discharge: HOME | End: 2024-11-21
Payer: MEDICAID

## 2024-11-21 DIAGNOSIS — R20.0 PAIN AND NUMBNESS OF RIGHT UPPER EXTREMITY: ICD-10-CM

## 2024-11-21 DIAGNOSIS — M54.50 CHRONIC RIGHT-SIDED LOW BACK PAIN WITHOUT SCIATICA: ICD-10-CM

## 2024-11-21 DIAGNOSIS — G89.29 CHRONIC RIGHT-SIDED LOW BACK PAIN WITHOUT SCIATICA: ICD-10-CM

## 2024-11-21 DIAGNOSIS — M54.12 CERVICAL RADICULOPATHY: Primary | ICD-10-CM

## 2024-11-21 DIAGNOSIS — R29.3 POOR POSTURE: ICD-10-CM

## 2024-11-21 DIAGNOSIS — M79.601 PAIN AND NUMBNESS OF RIGHT UPPER EXTREMITY: ICD-10-CM

## 2024-11-21 DIAGNOSIS — R29.898 WEAKNESS OF BOTH LOWER EXTREMITIES: ICD-10-CM

## 2024-11-21 DIAGNOSIS — R26.89 DECREASED SPINAL MOBILITY: ICD-10-CM

## 2024-11-21 DIAGNOSIS — R29.3 POSTURE IMBALANCE: ICD-10-CM

## 2024-11-21 PROCEDURE — 97140 MANUAL THERAPY 1/> REGIONS: CPT

## 2024-11-21 PROCEDURE — 97110 THERAPEUTIC EXERCISES: CPT

## 2024-11-21 NOTE — THERAPY PROGRESS REPORT/RE-CERT
Outpatient Physical Therapy Ortho Progress Note  [facility]     Patient Name: Serafin Hartmann  : 1965  MRN: [id number]  Today's Date: 2024      Visit Date: 2024    Visit Dx:    ICD-10-CM ICD-9-CM   1. Cervical radiculopathy  M54.12 723.4   2. Pain and numbness of right upper extremity  M79.601 729.5    R20.0 782.0   3. Poor posture  R29.3 781.92   4. Chronic right-sided low back pain without sciatica  M54.50 724.2    G89.29 338.29   5. Weakness of both lower extremities  R29.898 729.89   6. Posture imbalance  R29.3 729.90   7. Decreased spinal mobility  R26.89 V48.3       Patient Active Problem List   Diagnosis    Primary osteoarthritis of left hip    Status post left hip replacement    Routine adult health maintenance    Colon polyps    Psoriasis of scalp    Ganglion of right wrist    Idiopathic chronic gout of multiple sites with tophus    Atypical mole    Primary osteoarthritis involving multiple joints    DDD (degenerative disc disease), lumbar    Lumbosacral spondylosis without myelopathy    Muscle spasm        Past Medical History:   Diagnosis Date    Allergic     GERD (gastroesophageal reflux disease)     Low back pain     Prostatitis         Past Surgical History:   Procedure Laterality Date    BASAL CELL CARCINOMA EXCISION  2023    carcinoma basal cell removal off his nose    COLONOSCOPY N/A 10/13/2020    Procedure: COLONOSCOPY INTO CECUM WITH POLYPECTOMY X 3, CLIP X 1;  Surgeon: Carlito Mcdaniels MD;  Location: [facility] ENDOSCOPY;  Service: General;  Laterality: N/A;  pre: screening  post: colon polyps x 3    CYST REMOVAL Right 2023    eyebrow    HAND SURGERY Right     HIP SURGERY      KNEE SURGERY Right     MEDIAL BRANCH BLOCK Right 2024    Procedure: LUMBAR MEDIAL BRANCH BLOCK RIGHT L3-L5#1 97175, 45753;  Surgeon: Elier Matute MD;  [place] [facility] [place]  Service: Pain Management;  Laterality: Right;        PT Ortho       Row Name 24 0900        "Myotomal Screen- Upper Quarter Clearing    Shoulder flexion (C5) Bilateral:;4+ (Good +)  -CS       MMT Right Upper Ext    Rt Shoulder ABduction MMT, Gross Movement (5/5) normal  -CS    Rt Shoulder Internal Rotation MMT, Gross Movement (5/5) normal  -CS    Rt Shoulder External Rotation MMT, Gross Movement (5/5) normal  -CS              User Key  (r) = Recorded By, (t) = Taken By, (c) = Cosigned By      Initials Name Provider Type    CS Sonny Bauman PT Physical Therapist                                 PT Assessment/Plan       Row Name 11/21/24 0900          PT Assessment    Functional Limitations Limitations in community activities;Performance in leisure activities;Limitation in home management;Limitations in functional capacity and performance;Performance in self-care ADL  -CS     Impairments Impaired flexibility;Impaired muscle length;Impaired muscle power;Impaired postural alignment;Joint mobility;Muscle strength;Pain;Peripheral nerve integrity;Poor body mechanics;Posture;Range of motion;Sensation  -CS     Assessment Comments Serafin Hartmann has been seen for 5 physical therapy sessions (including today's session) for cervical radiculopathy into his RUE. Treatment has included therapeutic exercise, manual therapy, and patient education with home exercise program . Progress to physical therapy goals is fair. Pt has met 1/3 STG and 0/3 LTG. Pt presents today w/ increased peripheral symptoms into his RUE, he reports immediate onset upon driving. Following last session pt reports he had pain \"similar to if I had worked out for 8 hours straight\". Pt attempted UBE, unable to continue due to increased peripheral symptoms. Attempted supine exercises but pt continues w/ elevated N/T symptoms, moved pt to seated and standing exercises and pt able to tolerate w/out an increase in pain or peripheral symptoms. Able to progress scapular strenghtening and anterior stretching to improve posture. Pt performs standing chin tucks " today w/ minimal difficulty indicating no pain at C6-C7 w/ mild radicular symptoms. End of session pt reports he will be going to the gym after to cycle, symptoms appear to be related to positioning while seated in bike and specificlly while driving in his car.  He will benefit from continued skilled physical therapy to address remaining impairments and functional limitations.  -CS     Please refer to paper survey for additional self-reported information No  -CS     Rehab Potential Fair  -CS     Patient/caregiver participated in establishment of treatment plan and goals Yes  -CS     Patient would benefit from skilled therapy intervention Yes  -CS        PT Plan    PT Frequency 2x/week;1x/week  -CS     Predicted Duration of Therapy Intervention (PT) 8 visits  -CS     Planned CPT's? PT RE-EVAL: 13641;PT THER PROC EA 15 MIN: 30258;PT THER ACT EA 15 MIN: 98307;PT MANUAL THERAPY EA 15 MIN: 55308;PT NEUROMUSC RE-EDUCATION EA 15 MIN: 31239;PT EVAL AQUA: 79955;PT AQUATIC THERAPY EA 15 MIN: 26747;PT SELF CARE/HOME MGMT/TRAIN EA 15: 36226;PT HOT OR COLD PACK TREAT [facility]ARE;PT ELECTRICAL STIM UNATTEND: ;PT ULTRASOUND EA 15 MIN: 15924;PT TRACTION CERVICAL: 30994;PT HOT/COLD PACK WC NONMCARE: 56397;PT SELF CARE/MGMT/TRAIN 15 MIN: 83083  -CS     PT Plan Comments Continue w/ standing exercises, scap retrac + ER jenelle, D2 flexion  -CS               User Key  (r) = Recorded By, (t) = Taken By, (c) = Cosigned By      Initials Name Provider Type    Sonny Yeung, PT Physical Therapist                       OP Exercises       Row Name 11/21/24 0900             Subjective    Subjective Comments N/T into my R hand (along digitis 1-3 and 1/2 of 4)  -CS         Subjective Pain    Able to rate subjective pain? no  -CS         Total Minutes    25036 - PT Therapeutic Exercise Minutes 26  -CS      39442 - PT Therapeutic Activity Minutes 4  -CS      68161 - PT Manual Therapy Minutes 11  -CS         Exercise 1    Exercise Name 1 UBE  -CS       "Time 1 1 min  -CS      Additional Comments Increase in N/T  -CS         Exercise 2    Exercise Name 2 Standing chin tuck  -CS      Cueing 2 Verbal  -CS      Sets 2 1  -CS      Reps 2 15  -CS      Time 2 2s  -CS      Additional Comments Small ROM, noodle at back  -CS         Exercise 3    Exercise Name 3 Standing HA  -CS      Cueing 3 Verbal;Demo  -CS      Sets 3 2  -CS      Reps 3 10  -CS      Time 3 RTB  -CS      Additional Comments Noodle at back  -CS         Exercise 4    Exercise Name 4 supine straight arm theraloop flexion  -CS      Cueing 4 Verbal;Demo  -CS      Sets 4 2  -CS      Reps 4 10  -CS      Time 4 YTB  -CS         Exercise 5    Exercise Name 5 \"Is\" over SwB  -CS      Cueing 5 Verbal;Demo  -CS      Sets 5 1  -CS      Reps 5 12  -CS         Exercise 7    Exercise Name 7 Pec doorway stretch  -CS      Cueing 7 Verbal  -CS      Sets 7 3  -CS      Time 7 30s  -CS         Exercise 8    Exercise Name 8 Shoulder ext  -CS      Cueing 8 Verbal  -CS      Sets 8 1  -CS      Reps 8 12  -CS      Time 8 RTB  -CS                User Key  (r) = Recorded By, (t) = Taken By, (c) = Cosigned By      Initials Name Provider Type    CS Sonny Bauman, PT Physical Therapist                             Manual Rx (Last 36 Hours)       Manual Treatments       Row Name 11/21/24 0900             Total Minutes    20290 - PT Manual Therapy Minutes 11  -CS         Manual Rx 2    Manual Rx 2 Location STM R UT/LS, and pec minor  -CS      Manual Rx 2 Type sustained holds with soft tissue massage  -CS                User Key  (r) = Recorded By, (t) = Taken By, (c) = Cosigned By      Initials Name Provider Type    CS Sonny Bauman, PT Physical Therapist                     PT OP Goals       Row Name 11/21/24 0900          PT Short Term Goals    STG 1 Pt will be independent with initial HEP and postural awareness to improve pain and symptoms.  -CS     STG 1 Progress Met  -CS     STG 1 Progress Comments Pt performs his stretches regularly  -CS "     STG 2 Pt to be educated in/verbalize understanding of the importance of posture/ergonomics in association with their condition to facilitate self management of their condition  -CS     STG 2 Progress Ongoing;Not Met  -CS     STG 2 Progress Comments Pt unable to report specific adjustments to posture for symptom relief  -CS     STG 3 Pt. Will report 25% reduction in instances of numbness/tingling into R UE to improve QOL.  -CS     STG 3 Progress Ongoing;Not Met  -CS     STG 3 Progress Comments Pt states he can't provide a number for relief he has had some relief w/ intermittent instances of regression  -CS        Long Term Goals    LTG Date to Achieve 12/24/24  -CS     LTG 1 Pt will be independent with advance HEP and postural awareness for continued management of pain and symptoms.  -CS     LTG 1 Progress Ongoing;Not Met  -CS     LTG 1 Progress Comments Continuing to progress  -CS     LTG 2 Pt. Will report 50% reduction in instances of numbness/tingling into R UE to improve QOL.  -CS     LTG 2 Progress Ongoing  -CS     LTG 2 Progress Comments Pt states he can't provide a number for relief he has had some relief w/ intermittent instances of regression  -CS     LTG 3 Pt demonstrateds 5/5 bilateral shoulder and scapular strength without reproduction of cervical and RUE symptoms for greater ease of lifting tasks.  -CS     LTG 3 Progress Ongoing;Not Met  -CS     LTG 3 Progress Comments See ortho  -CS               User Key  (r) = Recorded By, (t) = Taken By, (c) = Cosigned By      Initials Name Provider Type    Sonny Yeung PT Physical Therapist                    Therapy Education  Education Details: Pt positioning, anatomy  Given: Posture/body mechanics, Pain management  Program: Reinforced  How Provided: Verbal, Demonstration  Provided to: Patient  Level of Understanding: Verbalized, Demonstrated              Time Calculation:   Start Time: 0904  Stop Time: 0945  Time Calculation (min): 41 min  Timed  Charges  58167 - PT Therapeutic Exercise Minutes: 26  13669 - PT Manual Therapy Minutes: 11  34261 - PT Therapeutic Activity Minutes: 4  Total Minutes  Timed Charges Total Minutes: 41   Total Minutes: 41  Therapy Charges for Today       Code Description Service Date Service Provider Modifiers Qty    16583273396  PT THER PROC EA 15 MIN 11/21/2024 Sonny Bauman, PT GP 2    [id number]  PT MANUAL THERAPY EA 15 MIN 11/21/2024 Sonny Bauman, PT GP 1                      Sonny Bauman, PT  11/21/2024

## 2024-11-26 ENCOUNTER — HOSPITAL ENCOUNTER (OUTPATIENT)
Dept: PHYSICAL THERAPY | Facility: HOSPITAL | Age: 59
Setting detail: THERAPIES SERIES
Discharge: HOME | End: 2024-11-26
Payer: MEDICAID

## 2024-11-26 DIAGNOSIS — M54.12 CERVICAL RADICULOPATHY: Primary | ICD-10-CM

## 2024-11-26 DIAGNOSIS — R29.3 POOR POSTURE: ICD-10-CM

## 2024-11-26 DIAGNOSIS — R20.0 PAIN AND NUMBNESS OF RIGHT UPPER EXTREMITY: ICD-10-CM

## 2024-11-26 DIAGNOSIS — M79.601 PAIN AND NUMBNESS OF RIGHT UPPER EXTREMITY: ICD-10-CM

## 2024-11-26 PROCEDURE — 97140 MANUAL THERAPY 1/> REGIONS: CPT

## 2024-11-26 PROCEDURE — 97110 THERAPEUTIC EXERCISES: CPT

## 2024-11-26 NOTE — THERAPY TREATMENT NOTE
Outpatient Physical Therapy Ortho Treatment Note  [facility]     Patient Name: Serafin Hartmann  : 1965  MRN: [id number]  Today's Date: 2024      Visit Date: 2024    Visit Dx:    ICD-10-CM ICD-9-CM   1. Cervical radiculopathy  M54.12 723.4   2. Pain and numbness of right upper extremity  M79.601 729.5    R20.0 782.0   3. Poor posture  R29.3 781.92       Patient Active Problem List   Diagnosis    Primary osteoarthritis of left hip    Status post left hip replacement    Routine adult health maintenance    Colon polyps    Psoriasis of scalp    Ganglion of right wrist    Idiopathic chronic gout of multiple sites with tophus    Atypical mole    Primary osteoarthritis involving multiple joints    DDD (degenerative disc disease), lumbar    Lumbosacral spondylosis without myelopathy    Muscle spasm        Past Medical History:   Diagnosis Date    Allergic     GERD (gastroesophageal reflux disease)     Low back pain     Prostatitis         Past Surgical History:   Procedure Laterality Date    BASAL CELL CARCINOMA EXCISION  2023    carcinoma basal cell removal off his nose    COLONOSCOPY N/A 10/13/2020    Procedure: COLONOSCOPY INTO CECUM WITH POLYPECTOMY X 3, CLIP X 1;  Surgeon: Carlito Mcdaniels MD;  Location: [facility] ENDOSCOPY;  Service: General;  Laterality: N/A;  pre: screening  post: colon polyps x 3    CYST REMOVAL Right 2023    eyebrow    HAND SURGERY Right     HIP SURGERY      KNEE SURGERY Right     MEDIAL BRANCH BLOCK Right 2024    Procedure: LUMBAR MEDIAL BRANCH BLOCK RIGHT L3-L5#1 81326, 51884;  Surgeon: Elier Matute MD;  [place] [facility] [place]  Service: Pain Management;  Laterality: Right;                        PT Assessment/Plan       Row Name 24 1000          PT Assessment    Assessment Comments Serafin returns this date denying any significant overall changes in symptoms. Spent time discussing epidural injection for pain management, realistic expectations  for improvement in symptoms and continued to reinforce likely contributing factors to ongoing pain being multifactoral with strength and postural deficits as well as significant neural tension. Spent entire session in standing this date to assess for improved tolerance as well as added radial nerve glide with good tolerance. Symptom reproduction through all exercises with overhead component, copious cueing to reduce forward head positioning as it typically is position of comfort for patient. Completed manual in sitting, emphasizing taught muscle bands throughout UT/LS, utilizing pressure points with mobilization through movement as well as worked to manual tension lateral neural structures. Encouraged increased rest and light activity for the next 2 days, will assess tolerance to progressed activity.  -MO        PT Plan    PT Plan Comments tolerance to manual last session? standing D2 flex, update HEP, resisted shoulder shrugs  -MO               User Key  (r) = Recorded By, (t) = Taken By, (c) = Cosigned By      Initials Name Provider Type    Beryl Aranda, PT Physical Therapist                       OP Exercises       Row Name 11/26/24 0800             Subjective    Subjective Comments Sore after therapy. It still gets worse as the day goes on  -MO         Subjective Pain    Able to rate subjective pain? no  -MO         Total Minutes    17507 - PT Therapeutic Exercise Minutes 30  -MO      39180 - PT Manual Therapy Minutes 8  -MO         Exercise 3    Exercise Name 3 Standing HA  -MO      Cueing 3 Verbal  -MO      Sets 3 2  -MO      Reps 3 10  -MO      Time 3 YTB  -MO         Exercise 4    Exercise Name 4 standing straight arm theraloop flexion  -MO      Cueing 4 Verbal  -MO      Sets 4 2  -MO      Reps 4 8  -MO      Time 4 YTB  -MO      Additional Comments at wall  -MO         Exercise 8    Exercise Name 8 Shoulder ext  -MO      Cueing 8 Verbal  -MO      Sets 8 2  -MO      Reps 8 10  -MO      Time 8 RTB  -MO          Exercise 9    Exercise Name 9 resisted wall walks  -MO      Cueing 9 Verbal;Demo  -MO      Sets 9 x6 horizontal  -MO      Reps 9 x6 vertical  -MO      Time 9 RTB  -MO         Exercise 10    Exercise Name 10 Radial nerve glide standing  -MO      Cueing 10 Verbal;Demo  -MO      Sets 10 1  -MO      Reps 10 10  -MO                User Key  (r) = Recorded By, (t) = Taken By, (c) = Cosigned By      Initials Name Provider Type    Beryl Aranda PT Physical Therapist                             Manual Rx (Last 36 Hours)       Manual Treatments       Row Name 11/26/24 0900 11/26/24 0800          Total Minutes    64548 - PT Manual Therapy Minutes -- 8  -MO        Manual Rx 2    Manual Rx 2 Location seated R UT stretch. trigger point pressure with mobilization, neural tensioning  -MO --               User Key  (r) = Recorded By, (t) = Taken By, (c) = Cosigned By      Initials Name Provider Type    Beryl Aranda PT Physical Therapist                                       Time Calculation:   Start Time: 0830  Stop Time: 0908  Time Calculation (min): 38 min  Timed Charges  02614 - PT Therapeutic Exercise Minutes: 30  62766 - PT Manual Therapy Minutes: 8  Total Minutes  Timed Charges Total Minutes: 38   Total Minutes: 38  Therapy Charges for Today       Code Description Service Date Service Provider Modifiers Qty    38329498574  PT THER PROC EA 15 MIN 11/26/2024 Beryl Barlow PT GP 2    [id number]  PT MANUAL THERAPY EA 15 MIN 11/26/2024 Beryl Barlow PT GP 1                      Beryl Barlow PT  11/26/2024

## 2024-12-03 ENCOUNTER — HOSPITAL ENCOUNTER (OUTPATIENT)
Dept: PHYSICAL THERAPY | Facility: HOSPITAL | Age: 59
Setting detail: THERAPIES SERIES
Discharge: HOME | End: 2024-12-03
Payer: MEDICAID

## 2024-12-03 DIAGNOSIS — M79.601 PAIN AND NUMBNESS OF RIGHT UPPER EXTREMITY: ICD-10-CM

## 2024-12-03 DIAGNOSIS — M54.12 CERVICAL RADICULOPATHY: Primary | ICD-10-CM

## 2024-12-03 DIAGNOSIS — R20.0 PAIN AND NUMBNESS OF RIGHT UPPER EXTREMITY: ICD-10-CM

## 2024-12-03 DIAGNOSIS — R29.3 POOR POSTURE: ICD-10-CM

## 2024-12-03 PROCEDURE — 97110 THERAPEUTIC EXERCISES: CPT

## 2024-12-03 PROCEDURE — 97140 MANUAL THERAPY 1/> REGIONS: CPT

## 2024-12-03 NOTE — THERAPY TREATMENT NOTE
Outpatient Physical Therapy Ortho Treatment Note  [facility]     Patient Name: Serafin Hartmann  : 1965  MRN: [id number]  Today's Date: 12/3/2024      Visit Date: 2024    Visit Dx:    ICD-10-CM ICD-9-CM   1. Cervical radiculopathy  M54.12 723.4   2. Pain and numbness of right upper extremity  M79.601 729.5    R20.0 782.0   3. Poor posture  R29.3 781.92       Patient Active Problem List   Diagnosis    Primary osteoarthritis of left hip    Status post left hip replacement    Routine adult health maintenance    Colon polyps    Psoriasis of scalp    Ganglion of right wrist    Idiopathic chronic gout of multiple sites with tophus    Atypical mole    Primary osteoarthritis involving multiple joints    DDD (degenerative disc disease), lumbar    Lumbosacral spondylosis without myelopathy    Muscle spasm        Past Medical History:   Diagnosis Date    Allergic     GERD (gastroesophageal reflux disease)     Low back pain     Prostatitis         Past Surgical History:   Procedure Laterality Date    BASAL CELL CARCINOMA EXCISION  2023    carcinoma basal cell removal off his nose    COLONOSCOPY N/A 10/13/2020    Procedure: COLONOSCOPY INTO CECUM WITH POLYPECTOMY X 3, CLIP X 1;  Surgeon: Carlito Mcdaniels MD;  Location: [facility] ENDOSCOPY;  Service: General;  Laterality: N/A;  pre: screening  post: colon polyps x 3    CYST REMOVAL Right 2023    eyebrow    HAND SURGERY Right     HIP SURGERY      KNEE SURGERY Right     MEDIAL BRANCH BLOCK Right 2024    Procedure: LUMBAR MEDIAL BRANCH BLOCK RIGHT L3-L5#1 24767, 51958;  Surgeon: Elier Matute MD;  [place]: [facility] [place];  Service: Pain Management;  Laterality: Right;                        PT Assessment/Plan       Row Name 24 0700          PT Assessment    Assessment Comments Serafin returns, denying any significant changes however does report having a good pain day yesterday following full day of rest. Additionally he does report feeling  increased tightness throughout R UT/shoulder girdle, stating increased joint sounds throughout neck and shoulder with movement. Discussed normal joint sounds, increased crepitus and cavitations with arthritic changes, neural inflammation, etc in regards to current symptoms. Discussed reinforcing rest for the next 2 days to assess for symptom provocation activities. Added lat pull down as well as initiated manual w/ STM to R lat and infraspinatus with several trigger points. He continues to have significant taught bands throughout UT, he may benefit from DDN in future to address. He has apt with pain management tomorrow, discussed current POC with potential injection in the future, will f/u with pt following apt.  -MO        PT Plan    PT Plan Comments resisted shoulder shrugs  -MO               User Key  (r) = Recorded By, (t) = Taken By, (c) = Cosigned By      Initials Name Provider Type    Beryl Aranda, PT Physical Therapist                       OP Exercises       Row Name 12/03/24 0700             Subjective    Subjective Comments Really good day yesterday but didn't do anything at all. Haven't been to the gym since thursday  -MO         Subjective Pain    Able to rate subjective pain? no  -MO         Total Minutes    00257 - PT Therapeutic Exercise Minutes 25  -MO      24647 - PT Manual Therapy Minutes 13  -MO         Exercise 3    Exercise Name 3 Standing HA  -MO      Cueing 3 Verbal  -MO      Sets 3 2  -MO      Reps 3 10  -MO      Time 3 YTB  -MO      Additional Comments at wall  -MO         Exercise 4    Exercise Name 4 standing straight arm theraloop flexion  -MO      Cueing 4 Verbal  -MO      Sets 4 1  -MO      Reps 4 20  -MO      Time 4 YTB  -MO      Additional Comments at wall  -MO         Exercise 6    Exercise Name 6 lat pull down  -MO      Cueing 6 Verbal;Demo  -MO      Sets 6 2  -MO      Reps 6 10  -MO      Time 6 60#  -MO         Exercise 9    Exercise Name 9 resisted wall walks  -MO      Cueing  9 Verbal  -MO      Sets 9 x6 horizontal  -MO      Reps 9 x6 vertical  -MO      Time 9 RTB  -MO                User Key  (r) = Recorded By, (t) = Taken By, (c) = Cosigned By      Initials Name Provider Type    Beryl Aranda PT Physical Therapist                             Manual Rx (Last 36 Hours)       Manual Treatments       Row Name 12/03/24 0700             Total Minutes    28003 - PT Manual Therapy Minutes 13  -MO         Manual Rx 2    Manual Rx 2 Location seated R UT stretch. trigger point pressure with mobilization, neural tensioning  -MO         Manual Rx 3    Manual Rx 3 Location R shoulder girdle  -MO      Manual Rx 3 Type STM w/ TPR to lats, subscapularus, infraspinatus  -MO                User Key  (r) = Recorded By, (t) = Taken By, (c) = Cosigned By      Initials Name Provider Type    Beryl Aranda, PT Physical Therapist                                       Time Calculation:   Start Time: 0700  Stop Time: 0738  Time Calculation (min): 38 min  Timed Charges  01644 - PT Therapeutic Exercise Minutes: 25  43102 - PT Manual Therapy Minutes: 13  Total Minutes  Timed Charges Total Minutes: 38   Total Minutes: 38  Therapy Charges for Today       Code Description Service Date Service Provider Modifiers Qty    50499843010 HC PT THER PROC EA 15 MIN 12/3/2024 Beryl Barlow, PT GP 2    39410981353 HC PT MANUAL THERAPY EA 15 MIN 12/3/2024 Beryl Barlow PT GP 1                      Beryl Barlow PT  12/3/2024

## 2024-12-05 ENCOUNTER — HOSPITAL ENCOUNTER (OUTPATIENT)
Dept: PHYSICAL THERAPY | Facility: HOSPITAL | Age: 59
Setting detail: THERAPIES SERIES
Discharge: HOME | End: 2024-12-05
Payer: MEDICAID

## 2024-12-05 ENCOUNTER — HOSPITAL ENCOUNTER (OUTPATIENT)
Dept: PHYSICAL THERAPY | Facility: HOSPITAL | Age: 59
Setting detail: THERAPIES SERIES
Discharge: HOME | End: 2024-12-05

## 2024-12-05 DIAGNOSIS — M54.12 CERVICAL RADICULOPATHY: Primary | ICD-10-CM

## 2024-12-05 DIAGNOSIS — R29.3 POOR POSTURE: ICD-10-CM

## 2024-12-05 DIAGNOSIS — M79.601 PAIN AND NUMBNESS OF RIGHT UPPER EXTREMITY: ICD-10-CM

## 2024-12-05 DIAGNOSIS — R20.0 PAIN AND NUMBNESS OF RIGHT UPPER EXTREMITY: ICD-10-CM

## 2024-12-05 PROCEDURE — 97535 SELF CARE MNGMENT TRAINING: CPT

## 2024-12-05 NOTE — THERAPY TREATMENT NOTE
Outpatient Physical Therapy Ortho Treatment Note  [facility]     Patient Name: Serafin Hartmann  : 1965  MRN: [id number]  Today's Date: 2024      Visit Date: 2024    Visit Dx:    ICD-10-CM ICD-9-CM   1. Cervical radiculopathy  M54.12 723.4   2. Pain and numbness of right upper extremity  M79.601 729.5    R20.0 782.0   3. Poor posture  R29.3 781.92       Patient Active Problem List   Diagnosis    Primary osteoarthritis of left hip    Status post left hip replacement    Routine adult health maintenance    Colon polyps    Psoriasis of scalp    Ganglion of right wrist    Idiopathic chronic gout of multiple sites with tophus    Atypical mole    Primary osteoarthritis involving multiple joints    DDD (degenerative disc disease), lumbar    Lumbosacral spondylosis without myelopathy    Muscle spasm        Past Medical History:   Diagnosis Date    Allergic     GERD (gastroesophageal reflux disease)     Low back pain     Prostatitis         Past Surgical History:   Procedure Laterality Date    BASAL CELL CARCINOMA EXCISION  2023    carcinoma basal cell removal off his nose    COLONOSCOPY N/A 10/13/2020    Procedure: COLONOSCOPY INTO CECUM WITH POLYPECTOMY X 3, CLIP X 1;  Surgeon: Carlito Mcdaniels MD;  Location: [facility] ENDOSCOPY;  Service: General;  Laterality: N/A;  pre: screening  post: colon polyps x 3    CYST REMOVAL Right 2023    eyebrow    HAND SURGERY Right     HIP SURGERY      KNEE SURGERY Right     MEDIAL BRANCH BLOCK Right 2024    Procedure: LUMBAR MEDIAL BRANCH BLOCK RIGHT L3-L5#1 44756, 18613;  Surgeon: Elier Matute MD;  Location: [facility] [place];  Service: Pain Management;  Laterality: Right;                        PT Assessment/Plan       Row Name 24 0900          PT Assessment    Assessment Comments Serafin returns this date following pain management appointment yesterday, reports he will undergo c spine epidural likely within the next 1-2 weeks. He reports desire  for d/c this date. At this time, has completed 8 visits of therapy for cspine pain w/ R side radiculopathy. Progress to goals likely limited secondary to limited overall time dedicated to conservative treatment measures. Did spent time this date discussing indications for DDN treatment in regards to ongoing symptoms with pt likely being good candidate to address tension throughout R shoulder girdle. Reviewed strength training guidelines, discussed returning to gym with emphasis on dorsal scapular strengthening for postural support, and continued to encourage rest from daily biking for decreased postural stress at CT junction. Encouraged pt to f/u should symptoms change.  -MO        PT Plan    PT Plan Comments DDN this afternoon  -MO               User Key  (r) = Recorded By, (t) = Taken By, (c) = Cosigned By      Initials Name Provider Type    Beryl Aranda, PT Physical Therapist                       OP Exercises       Row Name 12/05/24 0900             Exercise 1    Exercise Name 1 d/c discussion  -MO                User Key  (r) = Recorded By, (t) = Taken By, (c) = Cosigned By      Initials Name Provider Type    Beryl Aranda, PT Physical Therapist                                  PT OP Goals       Row Name 12/05/24 0900          PT Short Term Goals    STG 1 Pt will be independent with initial HEP and postural awareness to improve pain and symptoms.  -MO     STG 1 Progress Met  -MO     STG 2 Pt to be educated in/verbalize understanding of the importance of posture/ergonomics in association with their condition to facilitate self management of their condition  -MO     STG 2 Progress Not Met  -MO     STG 3 Pt. Will report 25% reduction in instances of numbness/tingling into R UE to improve QOL.  -MO     STG 3 Progress Not Met  -MO        Long Term Goals    LTG Date to Achieve 12/24/24  -MO     LTG 1 Pt will be independent with advance HEP and postural awareness for continued management of pain and symptoms.   -MO     LTG 1 Progress Not Met  -MO     LTG 2 Pt. Will report 50% reduction in instances of numbness/tingling into R UE to improve QOL.  -MO     LTG 2 Progress Not Met  -MO     LTG 3 Pt demonstrateds 5/5 bilateral shoulder and scapular strength without reproduction of cervical and RUE symptoms for greater ease of lifting tasks.  -MO     LTG 3 Progress Not Met  -MO               User Key  (r) = Recorded By, (t) = Taken By, (c) = Cosigned By      Initials Name Provider Type    Beryl Aranda PT Physical Therapist                    Therapy Education  Education Details: Reviewed strength guidelines, educated on DDN. Discussed return to strength training at the gym, continuing to hold on the bike  Given: HEP, Mobility training  Program: Reinforced  How Provided: Verbal  Provided to: Patient  Level of Understanding: Verbalized  90328 - PT Self Care/Mgmt Minutes: 8              Time Calculation:   Start Time: 0900  Stop Time: 0908  Time Calculation (min): 8 min  Timed Charges  46855 - PT Self Care/Mgmt Minutes: 8  Total Minutes  Timed Charges Total Minutes: 8   Total Minutes: 8  Therapy Charges for Today       Code Description Service Date Service Provider Modifiers Qty    74546709892  PT SELF CARE/MGMT/TRAIN EA 15 MIN 12/5/2024 Beryl Barlow, PT GP 1                      Beryl Barlow PT  12/5/2024

## 2024-12-05 NOTE — THERAPY TREATMENT NOTE
Outpatient Physical Therapy Ortho Treatment Note  [facility]     Patient Name: Serafin Hartmann  : 1965  MRN: [id number]  Today's Date: 2024      Visit Date: 2024    Visit Dx:    ICD-10-CM ICD-9-CM   1. Cervical radiculopathy  M54.12 723.4   2. Pain and numbness of right upper extremity  M79.601 729.5    R20.0 782.0   3. Poor posture  R29.3 781.92       Patient Active Problem List   Diagnosis    Primary osteoarthritis of left hip    Status post left hip replacement    Routine adult health maintenance    Colon polyps    Psoriasis of scalp    Ganglion of right wrist    Idiopathic chronic gout of multiple sites with tophus    Atypical mole    Primary osteoarthritis involving multiple joints    DDD (degenerative disc disease), lumbar    Lumbosacral spondylosis without myelopathy    Muscle spasm        Past Medical History:   Diagnosis Date    Allergic     GERD (gastroesophageal reflux disease)     Low back pain     Prostatitis         Past Surgical History:   Procedure Laterality Date    BASAL CELL CARCINOMA EXCISION  2023    carcinoma basal cell removal off his nose    COLONOSCOPY N/A 10/13/2020    Procedure: COLONOSCOPY INTO CECUM WITH POLYPECTOMY X 3, CLIP X 1;  Surgeon: Carlito Mcdaniels MD;  Location: [facility] ENDOSCOPY;  Service: General;  Laterality: N/A;  pre: screening  post: colon polyps x 3    CYST REMOVAL Right 2023    eyebrow    HAND SURGERY Right     HIP SURGERY      KNEE SURGERY Right     MEDIAL BRANCH BLOCK Right 2024    Procedure: LUMBAR MEDIAL BRANCH BLOCK RIGHT L3-L5#1 99783, 41318;  Surgeon: Elier Matute MD;  [place] [facility] [place];  Service: Pain Management;  Laterality: Right;                        PT Assessment/Plan       Row Name 24 1217          PT Assessment    Assessment Comments Mr. Hartmann presents to the clinic this afternoon for a trial of dry needling to the right upper trap and right latissimus dorsi tissue.  Educated patient in the  indications for and risk and benefits of dry needling.  Following verbal and written consent proceeded with dry needling to the right upper trap and right latissimus dorsi.  He demonstrated several small to moderate LTR's in the right latissimus dorsi.  He demonstrated multiple moderate to large LTR's in the right upper trap region.  No immediate adverse response noted.  He reports he will likely go through steroid epidural injection to the cervical spine prior to returning to the clinic.  -        PT Plan    PT Plan Comments If he returns to clinic assess response to dry needling in the right upper trap and right latissimus dorsi.  Repeat as needed.  -               User Key  (r) = Recorded By, (t) = Taken By, (c) = Cosigned By      Initials Name Provider Type    Hector Null, PT Physical Therapist                       OP Exercises       Row Name 12/05/24 1100             Subjective    Subjective Comments i would like to try dry needling. The pain N/T is all R sided  -                User Key  (r) = Recorded By, (t) = Taken By, (c) = Cosigned By      Initials Name Provider Type    Hector Null, PT Physical Therapist                             Manual Rx (Last 36 Hours)       Manual Treatments       Row Name 12/05/24 1100             Manual Rx 4    Manual Rx 4 Location intramuscuar manual therapy  -GJ Assessed pt. for Dry Needling and intramuscular manual therapy. Discussed risks/benefits of Dry Needling with pt, including but not limited to muscle soreness, bruising, vasovagal response, pneumothorax, nerve injury. Patient signed written consent to proceed with treatment.    Patient position during treatment: Prone, with nose in the nose hole  Muscles treated: Right latissimus dorsi, right upper trap  Response to treatment:   demonstrated several small to moderate LTR's in the right latissimus dorsi. He demonstrated multiple moderate to large LTR's in the right upper trap region. No immediate  adverse response noted.   palpation used before, during, and after to facilitate assessment Clean needle technique observed at all times, precautions for lung fields, neurovascular structures observed.    DDN performed by Hector Lazo II, PT, DPT      Manual Rx 4 Type R UT, R lattisimus dorsi  -GJ                User Key  (r) = Recorded By, (t) = Taken By, (c) = Cosigned By      Initials Name Provider Type    Hector Null, PT Physical Therapist                        Therapy Education  Education Details: Discussed risks/benefits of DDN and it's role in therapy as an adjunct therapy and not a stand alone treatment. Discussed role of strength, postural awareness, activity modifications as integral components of the plan of care. Careful attention to detail that there is no guarantee of results with DDN. Answered questions  How Provided: Verbal  Provided to: Patient  Level of Understanding: Verbalized              Time Calculation:   Start Time: 1130  Stop Time: 1155  Time Calculation (min): 25 min  Therapy Charges for Today       Code Description Service Date Service Provider Modifiers Qty    [id number] HC PT SELF PAY DRY NEEDLING SESSION 12/5/2024 Hector Lazo, PT  1                      Hector Lazo, PT  12/5/2024

## 2025-05-13 NOTE — PROGRESS NOTES
"    I N T E R N A L  M E D I C I N E  Mae Carter, APRN      ENCOUNTER DATE:  05/16/2025    Serafin Caryor / 59 y.o. / male    CHIEF COMPLAINT     Annual Exam    Diagnosed with prostate cancer about 2-3 months ago by [facility].  November 2024 Prostate MRI with 0.8 cm PI-RADS 4 lesion within the left peripheral zone of the prostatic base laterally.  Biopsy showed prostate cancer, per patient.  Per patient, no plan for treatment at this time - plan to monitor every 6 months.  He states next follow up with [facility] is scheduled next week.      At this time, cervical radiculopathy symptoms are being controlled with home PT exercises.  Denies any neck pain symptoms or new, worsening radiculopathy symptoms.  November 2024 Cervical MRI with degenerative disc disease and facet arthritis with C6-7 with broad disc/ osteophyte complex and mild canal narrowing.  Severe right and moderate to severe left foraminal narrowing.   Tentatively thinking about meeting with a neurosurgeon to discuss surgical options - not yet ready.  He will let me know if he would like to receive a referral at later time.       Medical history is significant for gout, which is well controlled on allopurinol 100 mg daily.     Requesting new referral to dermatology office given history of basal cell carcinoma.  States prior provider no longer takes his insurance.       Due to update colonoscopy this October with Dr. Mcdaniels.  Agreeable for order.        VITALS     Visit Vitals  /86   Pulse 67   Temp 97.8 °F (36.6 °C)   Resp 16   Ht 185.4 cm (72.99\")   Wt 86.2 kg (190 lb)   SpO2 98%   BMI 25.07 kg/m²       BP Readings from Last 3 Encounters:   05/16/25 130/86   09/12/24 122/78   08/21/24 151/82     Wt Readings from Last 3 Encounters:   05/16/25 86.2 kg (190 lb)   09/27/24 84.2 kg (185 lb 9.6 oz)   09/12/24 82.6 kg (182 lb)      Body mass index is 25.07 kg/m².      MEDICATIONS     Current Outpatient Medications on File Prior to Visit "   Medication Sig Dispense Refill    allopurinol (ZYLOPRIM) 100 MG tablet TAKE 1 TABLET BY MOUTH DAILY 90 tablet 1    clobetasol (TEMOVATE) 0.05 % external solution       colchicine 0.6 MG tablet 2 tabs by mouth and 1 tab 1 hr later, 12 hrs later take 1 tab. take 1 tab 1-2 times a day until 2 days after symptoms resolve. 9 tablet 0    GLUCOSAMINE-CHONDROITIN PO Take  by mouth.      Multiple Vitamins-Minerals (MULTIVITAMIN ADULT PO) Take 1 tablet by mouth Daily.      Probiotic Product (PROBIOTIC DAILY PO) Take  by mouth.      sildenafil (REVATIO) 20 MG tablet Take 1 to 5 tablets 30 to 60 minutes before sexual activity - start with lower dose and titrate higher as needed 90 tablet 0    triamcinolone (KENALOG) 0.1 % ointment       valACYclovir (VALTREX) 500 MG tablet Take 1 tablet by mouth 2 (Two) Times a Day. 30 tablet 0    ketoconazole (NIZORAL) 2 % shampoo  (Patient not taking: Reported on 2025)      [DISCONTINUED] cyclobenzaprine (FLEXERIL) 10 MG tablet Take 1 tablet by mouth 3 (Three) Times a Day As Needed for Muscle Spasms. (Patient not taking: Reported on 2025) 30 tablet 0    [DISCONTINUED] meloxicam (MOBIC) 15 MG tablet Take 1 tablet by mouth Daily. (Patient not taking: Reported on 2025) 30 tablet 0     No current facility-administered medications on file prior to visit.         HISTORY OF PRESENT ILLNESS      Serafin presents for annual health maintenance visit.    General health: good  Lifestyle:  Attempting to lose weight?: No   Diet: eats a well balanced, healthy diet, plant based diet  Exercise: exercises 6 days weekly  Tobacco: Never used   Alcohol: occasional/infrequent  Work: Full-time  Reproductive health:  Sexually active?: No   Concern for STD?: No   Sexual problems?: No problems   Sees Urologist?: Yes for prostate cancer  Depression Screenin/16/2025     8:21 AM   PHQ-2/PHQ-9 Depression Screening   Little interest or pleasure in doing things Not at all   Feeling down,  depressed, or hopeless Not at all   How difficult have these problems made it for you to do your work, take care of things at home, or get along with other people? Not difficult at all         PHQ-2: 0 (Not depressed)     PHQ-9: 0 (Negative screening for depression)    Patient Care Team:  Mea Carter APRN as PCP - General (Family Medicine)  ______________________________________________________________________    ALLERGIES  Allergies   Allergen Reactions    Oxycodone-Acetaminophen Nausea And Vomiting and GI Intolerance        PFSH:     The following portions of the patient's history were reviewed and updated as appropriate: Allergies / Current Medications / Past Medical History / Surgical History / Social History / Family History    PROBLEM LIST   Patient Active Problem List   Diagnosis    Primary osteoarthritis of left hip    Status post left hip replacement    Routine adult health maintenance    Colon polyps    Psoriasis of scalp    Ganglion of right wrist    Idiopathic chronic gout of multiple sites with tophus    Atypical mole    Primary osteoarthritis involving multiple joints    DDD (degenerative disc disease), lumbar    Lumbosacral spondylosis without myelopathy    Muscle spasm    Prostate cancer    History of basal cell carcinoma       PAST MEDICAL HISTORY  Past Medical History:   Diagnosis Date    Allergic     GERD (gastroesophageal reflux disease)     History of basal cell carcinoma     Low back pain     Prostatitis        SURGICAL HISTORY  Past Surgical History:   Procedure Laterality Date    BASAL CELL CARCINOMA EXCISION  03/2023    carcinoma basal cell removal off his nose    COLONOSCOPY N/A 10/13/2020    Procedure: COLONOSCOPY INTO CECUM WITH POLYPECTOMY X 3, CLIP X 1;  Surgeon: Carlito Mcdaniels MD;  Location: [facility] ENDOSCOPY;  Service: General;  Laterality: N/A;  pre: screening  post: colon polyps x 3    CYST REMOVAL Right 11/2023    eyebrow    HAND SURGERY Right     HIP SURGERY      KNEE  SURGERY Right     MEDIAL BRANCH BLOCK Right 4/30/2024    Procedure: LUMBAR MEDIAL BRANCH BLOCK RIGHT L3-L5#1 79395, [id number];  Surgeon: Elier Matute MD;  Location: [facility] OR;  Service: Pain Management;  Laterality: Right;       SOCIAL HISTORY  Social History     Socioeconomic History    Marital status: Single    Number of children: 1   Tobacco Use    Smoking status: Never     Passive exposure: Never    Smokeless tobacco: Never    Tobacco comments:     daily caffine   Vaping Use    Vaping status: Never Used   Substance and Sexual Activity    Alcohol use: Yes     Comment: OCCASIONALLY     Drug use: Never    Sexual activity: Yes     Partners: Female       FAMILY HISTORY  Family History   Problem Relation Age of Onset    Alzheimer's disease Father     Colon polyps Sister     Heart attack Paternal Uncle     Prostate cancer Neg Hx     Colon cancer Neg Hx     Malig Hyperthermia Neg Hx        IMMUNIZATION HISTORY  Immunization History   Administered Date(s) Administered    COVID-19 (PFIZER) BIVALENT 12+YRS 10/19/2022    COVID-19 (PFIZER) Purple Cap Monovalent 03/10/2021, 04/07/2021, 12/21/2021    Shingrix 11/01/2023, 04/09/2024    Tdap 05/14/2024         REVIEW OF SYSTEMS     Review of Systems   Constitutional:  Negative for chills, fever and unexpected weight change.   Respiratory:  Negative for cough, chest tightness and shortness of breath.    Cardiovascular:  Negative for chest pain, palpitations and leg swelling.   Neurological:  Negative for dizziness, weakness, light-headedness and headaches.   Psychiatric/Behavioral:  The patient is not nervous/anxious.        PHYSICAL EXAMINATION     Physical Exam  Vitals reviewed.   Constitutional:       General: He is not in acute distress.     Appearance: Normal appearance. He is not ill-appearing, toxic-appearing or diaphoretic.   HENT:      Head: Normocephalic and atraumatic.      Right Ear: Tympanic membrane, ear canal and external ear normal. There is no impacted  cerumen.      Left Ear: Tympanic membrane, ear canal and external ear normal. There is no impacted cerumen.      Nose: Nose normal. No congestion or rhinorrhea.      Mouth/Throat:      Mouth: Mucous membranes are moist.      Pharynx: Oropharynx is clear. No oropharyngeal exudate or posterior oropharyngeal erythema.   Eyes:      Extraocular Movements: Extraocular movements intact.      Conjunctiva/sclera: Conjunctivae normal.      Pupils: Pupils are equal, round, and reactive to light.   Cardiovascular:      Rate and Rhythm: Normal rate and regular rhythm.      Heart sounds: Normal heart sounds.   Pulmonary:      Effort: Pulmonary effort is normal. No respiratory distress.      Breath sounds: Normal breath sounds.   Abdominal:      General: Bowel sounds are normal.      Palpations: Abdomen is soft.      Tenderness: There is no abdominal tenderness.   Musculoskeletal:         General: Normal range of motion.      Cervical back: Normal range of motion and neck supple.      Right lower leg: No edema.      Left lower leg: No edema.   Lymphadenopathy:      Cervical: No cervical adenopathy.   Skin:     General: Skin is warm and dry.   Neurological:      General: No focal deficit present.      Mental Status: He is alert and oriented to person, place, and time. Mental status is at baseline.   Psychiatric:         Mood and Affect: Mood normal.         Behavior: Behavior normal.         Thought Content: Thought content normal.         Judgment: Judgment normal.         REVIEWED DATA      Labs:    Lab Results   Component Value Date     05/14/2024    K 3.8 05/14/2024    CALCIUM 9.1 05/14/2024    AST 23 05/14/2024    ALT 14 05/14/2024    BUN 16 05/14/2024    CREATININE 0.80 05/14/2024    CREATININE 0.80 11/13/2023    CREATININE 0.91 10/12/2022    EGFRIFNONA 91 08/25/2020    EGFRIFAFRI 110 08/25/2020       Lab Results   Component Value Date    GLUCOSE 91 05/14/2024    HGBA1C 5.30 05/14/2024    HGBA1C 5.20 08/25/2020    TSH  "1.400 05/14/2024    FREET4 1.25 05/14/2024       Lab Results   Component Value Date    PSA 5.950 (H) 05/14/2024    PSA 4.630 (H) 09/09/2020    PSA 4.920 (H) 08/25/2020       [unfilled]    Lab Results   Component Value Date    LDL 96 05/14/2024    HDL 54 05/14/2024    TRIG 108 05/14/2024    CHOLHDLRATIO 3.13 05/14/2024       No components found for: \"[id number]\"    Lab Results   Component Value Date    WBC 4.72 05/14/2024    HGB 13.8 05/14/2024    MCV 89.5 05/14/2024     05/14/2024       Lab Results   Component Value Date    PROTEIN See below: (A) 05/14/2024    GLUCOSEU Negative 05/14/2024    BLOODU Negative 05/14/2024    NITRITEU Negative 05/14/2024    LEUKOCYTESUR Negative 05/14/2024          Lab Results   Component Value Date    HEPCVIRUSABY 0.1 08/25/2020       Imaging:           Medical Tests:           ASSESSMENT & PLAN     ANNUAL WELLNESS EXAM / PHYSICAL     Diagnoses and all orders for this visit:    1. Annual physical exam (Primary)  -     CBC & Differential  -     Comprehensive Metabolic Panel  -     Hemoglobin A1c  -     Lipid Panel With / Chol / HDL Ratio  -     TSH+Free T4    2. Pure hypercholesterolemia  -     Comprehensive Metabolic Panel  -     Lipid Panel With / Chol / HDL Ratio    3. Colon cancer screening  -     Ambulatory Referral For Screening Colonoscopy    4. History of basal cell carcinoma  -     Ambulatory Referral to Dermatology         Summary/Discussion:     Encouraged low fat diet, regular exercise.    Update Colonoscopy this October 2025.   Follow up with [facility] as scheduled for monitoring of recent prostate cancer diagnosis.    Next Appointment with me: Visit date not found    Return in about 1 year (around 5/16/2026) for Annual physical.      HEALTHCARE MAINTENANCE ISSUES       Cancer Screening:  Colon: Initial/Next screening at age: -Colonoscopy and DUE NOW  Repeat colon cancer screening: N/A at this time  Prostate:  Followed by [facility] every 6 months for prostate " cancer  Testicular: Recommended monthly self exam  Skin: Monthly self skin examination, annual exam by health professional  Lung: Does not meet criteria for lung cancer screening.   Other:    Screening Labs & Tests:  Lab results reviewed & discussed with with patient or orders placed today.  EKG:  CV Screening: Lipid panel  DEXA (75+ or risk factors):   HEP C (If born [birth year] or risk factors): Previously had negative screen  Other:     Immunization/Vaccinations (to be given today unless deferred by patient)  Influenza: Recommended annual influenza vaccine  Hepatitis A: Verify immunization records  Hepatitis B: Verify immunization records  Tetanus/Pertussis: Up to date  Pneumovax/PCV: Recommended here or at pharmacy  Shingles: Up to date  COVID: Does not plan to get the latest booster  Lifestyle Counseling:  Lifestyle Modifications: Continue good lifestyle choices/modifications and Discussed better management of stress/anxiety  Safety Issues: Always wear seatbelt, Avoid texting while driving   Use sunscreen, regular skin examination  Recommended annual dental/vision examination.  Emotional/Stress/Sleep: Reviewed and  given when appropriate      Health Maintenance   Topic Date Due    LIPID PANEL  05/14/2025    Pneumococcal Vaccine 50+ (1 of 1 - PCV) 11/12/2025 (Originally 10/17/2013)    COVID-19 Vaccine (5 - 2024-25 season) 11/16/2025 (Originally 9/1/2024)    INFLUENZA VACCINE  07/01/2025    ANNUAL PHYSICAL  05/16/2026    COLORECTAL CANCER SCREENING  05/16/2030    TDAP/TD VACCINES (2 - Td or Tdap) 05/14/2034    HEPATITIS C SCREENING  Completed    ZOSTER VACCINE  Completed

## 2025-05-16 ENCOUNTER — OFFICE VISIT (OUTPATIENT)
Dept: INTERNAL MEDICINE | Age: 60
End: 2025-05-16
Payer: MEDICAID

## 2025-05-16 VITALS
DIASTOLIC BLOOD PRESSURE: 86 MMHG | BODY MASS INDEX: 25.18 KG/M2 | TEMPERATURE: 97.8 F | HEIGHT: 73 IN | RESPIRATION RATE: 16 BRPM | WEIGHT: 190 LBS | HEART RATE: 67 BPM | SYSTOLIC BLOOD PRESSURE: 130 MMHG | OXYGEN SATURATION: 98 %

## 2025-05-16 DIAGNOSIS — Z12.11 COLON CANCER SCREENING: ICD-10-CM

## 2025-05-16 DIAGNOSIS — Z85.828 HISTORY OF BASAL CELL CARCINOMA: ICD-10-CM

## 2025-05-16 DIAGNOSIS — Z00.00 ANNUAL PHYSICAL EXAM: Primary | ICD-10-CM

## 2025-05-16 DIAGNOSIS — E78.00 PURE HYPERCHOLESTEROLEMIA: ICD-10-CM

## 2025-05-16 PROBLEM — C61 PROSTATE CANCER: Status: ACTIVE | Noted: 2025-05-16

## 2025-05-21 ENCOUNTER — TELEPHONE (OUTPATIENT)
Dept: GASTROENTEROLOGY | Facility: CLINIC | Age: 60
End: 2025-05-21
Payer: MEDICAID

## 2025-05-21 NOTE — TELEPHONE ENCOUNTER
Last colonoscopy 10/13/20    Personal hx polyps  No family hx polyps or cx    Asa or blood thinners:  Ibuprofen    List medications:  Allopurinol  Clobetasol  Colchicine  Glucosamine   Ketoconazole  Multi vitamin  Probiotic  Sildenafil  Triamcinolone  Valacyclovir    OA form scanned in media

## 2025-05-27 ENCOUNTER — PREP FOR SURGERY (OUTPATIENT)
Dept: OTHER | Facility: HOSPITAL | Age: 60
End: 2025-05-27
Payer: MEDICAID

## 2025-05-27 DIAGNOSIS — Z12.11 ENCOUNTER FOR SCREENING FOR MALIGNANT NEOPLASM OF COLON: Primary | ICD-10-CM

## 2025-05-27 DIAGNOSIS — Z86.0101 HISTORY OF ADENOMATOUS POLYP OF COLON: ICD-10-CM

## 2025-06-13 ENCOUNTER — TELEPHONE (OUTPATIENT)
Dept: GASTROENTEROLOGY | Facility: CLINIC | Age: 60
End: 2025-06-13
Payer: MEDICAID

## 2025-06-13 NOTE — TELEPHONE ENCOUNTER
RESHMA flores  for COLONOSCOPY on 09/23  arrive at  7am  . mailed prep instructions to verified address on file....miralax OK FOR THE HUB TO RELAY

## 2025-07-08 DIAGNOSIS — Z86.19 HISTORY OF HERPES GENITALIS: ICD-10-CM

## 2025-07-08 RX ORDER — VALACYCLOVIR HYDROCHLORIDE 500 MG/1
500 TABLET, FILM COATED ORAL 2 TIMES DAILY
Qty: 30 TABLET | Refills: 0 | Status: SHIPPED | OUTPATIENT
Start: 2025-07-08

## 2025-07-08 NOTE — TELEPHONE ENCOUNTER
Caller: Serafin Hartmann    Relationship: Self    Best call back number: [phone]    Requested Prescriptions:   Requested Prescriptions     Pending Prescriptions Disp Refills    valACYclovir (VALTREX) 500 MG tablet 30 tablet 0     Sig: Take 1 tablet by mouth 2 (Two) Times a Day.        Pharmacy where request should be sent: [id number] [address] [facility]  [place]  [phone] [facility] [phone]      Last office visit with prescribing clinician: 5/16/2025   Last telemedicine visit with prescribing clinician: Visit date not found   Next office visit with prescribing clinician: 5/19/2026     Additional details provided by patient: PATIENT STATED HE IS CURRENTLY HAVING AN OUTBREAK.    PATIENT IS COMPLETELY OUT    Does the patient have less than a 3 day supply:  [x] Yes  [] No    Would you like a call back once the refill request has been completed: [] Yes [x] No    If the office needs to give you a call back, can they leave a voicemail: [] Yes [x] No    Vivian Rice Rep   07/08/25 08:25 EDT

## 2025-08-04 ENCOUNTER — LAB (OUTPATIENT)
Facility: HOSPITAL | Age: 60
End: 2025-08-04
Payer: MEDICAID

## 2025-08-04 LAB
ALBUMIN SERPL-MCNC: 3.8 G/DL (ref 3.5–5.2)
ALBUMIN/GLOB SERPL: 1.7 G/DL
ALP SERPL-CCNC: 64 U/L (ref 39–117)
ALT SERPL W P-5'-P-CCNC: 15 U/L (ref 1–41)
ANION GAP SERPL CALCULATED.3IONS-SCNC: 7 MMOL/L (ref 5–15)
AST SERPL-CCNC: 24 U/L (ref 1–40)
BASOPHILS # BLD AUTO: 0.06 10*3/MM3 (ref 0–0.2)
BASOPHILS NFR BLD AUTO: 1.2 % (ref 0–1.5)
BILIRUB SERPL-MCNC: 0.3 MG/DL (ref 0–1.2)
BUN SERPL-MCNC: 12 MG/DL (ref 8–23)
BUN/CREAT SERPL: 17.1 (ref 7–25)
CALCIUM SPEC-SCNC: 8.6 MG/DL (ref 8.6–10.5)
CHLORIDE SERPL-SCNC: 109 MMOL/L (ref 98–107)
CHOLEST SERPL-MCNC: 168 MG/DL (ref 0–200)
CO2 SERPL-SCNC: 25 MMOL/L (ref 22–29)
CREAT SERPL-MCNC: 0.7 MG/DL (ref 0.76–1.27)
DEPRECATED RDW RBC AUTO: 45.2 FL (ref 37–54)
EGFRCR SERPLBLD CKD-EPI 2021: 104.8 ML/MIN/1.73
EOSINOPHIL # BLD AUTO: 0.31 10*3/MM3 (ref 0–0.4)
EOSINOPHIL NFR BLD AUTO: 6.2 % (ref 0.3–6.2)
ERYTHROCYTE [DISTWIDTH] IN BLOOD BY AUTOMATED COUNT: 13.2 % (ref 12.3–15.4)
GLOBULIN UR ELPH-MCNC: 2.3 GM/DL
GLUCOSE SERPL-MCNC: 95 MG/DL (ref 65–99)
HBA1C MFR BLD: 5 % (ref 4.8–5.6)
HCT VFR BLD AUTO: 43.9 % (ref 37.5–51)
HDLC SERPL QL: 3.23
HDLC SERPL-MCNC: 52 MG/DL (ref 40–60)
HGB BLD-MCNC: 14.5 G/DL (ref 13–17.7)
IMM GRANULOCYTES # BLD AUTO: 0.01 10*3/MM3 (ref 0–0.05)
IMM GRANULOCYTES NFR BLD AUTO: 0.2 % (ref 0–0.5)
LDLC SERPL CALC-MCNC: 90 MG/DL (ref 0–100)
LYMPHOCYTES # BLD AUTO: 1.11 10*3/MM3 (ref 0.7–3.1)
LYMPHOCYTES NFR BLD AUTO: 22.1 % (ref 19.6–45.3)
MCH RBC QN AUTO: 30.7 PG (ref 26.6–33)
MCHC RBC AUTO-ENTMCNC: 33 G/DL (ref 31.5–35.7)
MCV RBC AUTO: 93 FL (ref 79–97)
MONOCYTES # BLD AUTO: 0.25 10*3/MM3 (ref 0.1–0.9)
MONOCYTES NFR BLD AUTO: 5 % (ref 5–12)
NEUTROPHILS NFR BLD AUTO: 3.28 10*3/MM3 (ref 1.7–7)
NEUTROPHILS NFR BLD AUTO: 65.3 % (ref 42.7–76)
NRBC BLD AUTO-RTO: 0 /100 WBC (ref 0–0.2)
PLATELET # BLD AUTO: 183 10*3/MM3 (ref 140–450)
PMV BLD AUTO: 11 FL (ref 6–12)
POTASSIUM SERPL-SCNC: 4.5 MMOL/L (ref 3.5–5.2)
PROT SERPL-MCNC: 6.1 G/DL (ref 6–8.5)
RBC # BLD AUTO: 4.72 10*6/MM3 (ref 4.14–5.8)
SODIUM SERPL-SCNC: 141 MMOL/L (ref 136–145)
T4 FREE SERPL-MCNC: 1.07 NG/DL (ref 0.92–1.68)
TRIGL SERPL-MCNC: 149 MG/DL (ref 0–150)
TSH SERPL DL<=0.05 MIU/L-ACNC: 1.25 UIU/ML (ref 0.27–4.2)
VLDLC SERPL-MCNC: 26 MG/DL (ref 5–40)
WBC NRBC COR # BLD AUTO: 5.02 10*3/MM3 (ref 3.4–10.8)

## 2025-08-04 PROCEDURE — 80053 COMPREHEN METABOLIC PANEL: CPT

## 2025-08-04 PROCEDURE — 84443 ASSAY THYROID STIM HORMONE: CPT

## 2025-08-04 PROCEDURE — 83036 HEMOGLOBIN GLYCOSYLATED A1C: CPT

## 2025-08-04 PROCEDURE — 85025 COMPLETE CBC W/AUTO DIFF WBC: CPT

## 2025-08-04 PROCEDURE — 84439 ASSAY OF FREE THYROXINE: CPT

## 2025-08-04 PROCEDURE — 80061 LIPID PANEL: CPT

## (undated) DEVICE — EPIDURAL TRAY: Brand: MEDLINE INDUSTRIES, INC.

## (undated) DEVICE — THE SINGLE USE ETRAP – POLYP TRAP IS USED FOR SUCTION RETRIEVAL OF ENDOSCOPICALLY REMOVED POLYPS.: Brand: ETRAP

## (undated) DEVICE — SENSR O2 OXIMAX FNGR A/ 18IN NONSTR

## (undated) DEVICE — THE TORRENT IRRIGATION SCOPE CONNECTOR IS USED WITH THE TORRENT IRRIGATION TUBING TO PROVIDE IRRIGATION FLUIDS SUCH AS STERILE WATER DURING GASTROINTESTINAL ENDOSCOPIC PROCEDURES WHEN USED IN CONJUNCTION WITH AN IRRIGATION PUMP (OR ELECTROSURGICAL UNIT).: Brand: TORRENT

## (undated) DEVICE — ADAPT CLN BIOGUARD AIR/H2O DISP

## (undated) DEVICE — PAD GRND REM POLYHESIVE A/ DISP

## (undated) DEVICE — CANN O2 ETCO2 FITS ALL CONN CO2 SMPL A/ 7IN DISP LF

## (undated) DEVICE — LN SMPL CO2 SHTRM SD STREAM W/M LUER

## (undated) DEVICE — SNAR POLYP SENSATION STDOVL 27 240 BX40

## (undated) DEVICE — SINGLE-USE BIOPSY FORCEPS: Brand: RADIAL JAW 4

## (undated) DEVICE — GLV SURG TRIUMPH PF LTX 7.5 STRL

## (undated) DEVICE — NDL SPINE 22G 31/2IN BLK

## (undated) DEVICE — KT ORCA ORCAPOD DISP STRL

## (undated) DEVICE — TUBING, SUCTION, 1/4" X 10', STRAIGHT: Brand: MEDLINE